# Patient Record
Sex: FEMALE | Race: WHITE | NOT HISPANIC OR LATINO | Employment: PART TIME | URBAN - METROPOLITAN AREA
[De-identification: names, ages, dates, MRNs, and addresses within clinical notes are randomized per-mention and may not be internally consistent; named-entity substitution may affect disease eponyms.]

---

## 2017-12-08 ENCOUNTER — TRANSFERRED RECORDS (OUTPATIENT)
Dept: HEALTH INFORMATION MANAGEMENT | Facility: CLINIC | Age: 72
End: 2017-12-08

## 2018-04-04 ENCOUNTER — TRANSFERRED RECORDS (OUTPATIENT)
Dept: HEALTH INFORMATION MANAGEMENT | Facility: CLINIC | Age: 73
End: 2018-04-04

## 2018-04-20 ENCOUNTER — TRANSFERRED RECORDS (OUTPATIENT)
Dept: HEALTH INFORMATION MANAGEMENT | Facility: CLINIC | Age: 73
End: 2018-04-20

## 2018-05-01 ENCOUNTER — TRANSFERRED RECORDS (OUTPATIENT)
Dept: HEALTH INFORMATION MANAGEMENT | Facility: CLINIC | Age: 73
End: 2018-05-01

## 2018-06-07 ENCOUNTER — TRANSFERRED RECORDS (OUTPATIENT)
Dept: HEALTH INFORMATION MANAGEMENT | Facility: CLINIC | Age: 73
End: 2018-06-07

## 2018-07-30 ENCOUNTER — TRANSFERRED RECORDS (OUTPATIENT)
Dept: HEALTH INFORMATION MANAGEMENT | Facility: CLINIC | Age: 73
End: 2018-07-30

## 2018-08-08 ENCOUNTER — TRANSFERRED RECORDS (OUTPATIENT)
Dept: HEALTH INFORMATION MANAGEMENT | Facility: CLINIC | Age: 73
End: 2018-08-08

## 2018-10-23 ENCOUNTER — TRANSFERRED RECORDS (OUTPATIENT)
Dept: HEALTH INFORMATION MANAGEMENT | Facility: CLINIC | Age: 73
End: 2018-10-23

## 2018-11-01 ENCOUNTER — TRANSFERRED RECORDS (OUTPATIENT)
Dept: HEALTH INFORMATION MANAGEMENT | Facility: CLINIC | Age: 73
End: 2018-11-01

## 2019-01-21 ENCOUNTER — TRANSFERRED RECORDS (OUTPATIENT)
Dept: HEALTH INFORMATION MANAGEMENT | Facility: CLINIC | Age: 74
End: 2019-01-21

## 2019-04-10 ENCOUNTER — TRANSFERRED RECORDS (OUTPATIENT)
Dept: HEALTH INFORMATION MANAGEMENT | Facility: CLINIC | Age: 74
End: 2019-04-10

## 2019-05-10 ENCOUNTER — TRANSFERRED RECORDS (OUTPATIENT)
Dept: HEALTH INFORMATION MANAGEMENT | Facility: CLINIC | Age: 74
End: 2019-05-10

## 2019-07-24 ENCOUNTER — TRANSFERRED RECORDS (OUTPATIENT)
Dept: HEALTH INFORMATION MANAGEMENT | Facility: CLINIC | Age: 74
End: 2019-07-24

## 2019-08-21 ENCOUNTER — TELEPHONE (OUTPATIENT)
Dept: ORTHOPEDICS | Facility: CLINIC | Age: 74
End: 2019-08-21

## 2019-08-21 NOTE — TELEPHONE ENCOUNTER
[facility] Call Center    Phone Message    May a detailed message be left on voicemail: yes    Reason for Call: Other: Catrachita, with [facility], calling in to check the status of the referral that was sent over on 8/12/19. Catrachita stated that she also mailed a CD over to the clinic as well. Catrachita is wanting to discuss the MRI that she was told provider wants pt to have done. Writer was unable to see any records or notes regarding a referral or MRI. Please follow p with Catrachita as soon as available to discuss further. Thank you      Action Taken: Message routed to:  [facility] ([facility] Ortho  .  See phone message. CORRECT PHONE # [facility][phone].   I called Catrachita back & told her we did get disc of XR's & old MRI  & she will schedule New MRI for 8-30-19.  I called pt. & scheduled appt. & asked her to be sure to have [facility] send us new MRI & she agreed.  Christine Russo RN.

## 2019-08-26 ENCOUNTER — TRANSFERRED RECORDS (OUTPATIENT)
Dept: HEALTH INFORMATION MANAGEMENT | Facility: CLINIC | Age: 74
End: 2019-08-26

## 2019-08-29 ENCOUNTER — DOCUMENTATION ONLY (OUTPATIENT)
Dept: CARE COORDINATION | Facility: CLINIC | Age: 74
End: 2019-08-29

## 2019-08-29 NOTE — TELEPHONE ENCOUNTER
RECORDS RECEIVED FROM: appt per ina caimlo in ortho. broken preeti lumbar fusion 2017 at [phone]. new lumbar mri to be done august 30th 2019 @ [facility].  Also need imaging & records from [facility].   DATE RECEIVED: Oct 10, 2019    NOTES STATUS DETAILS   OFFICE NOTE from referring provider Received 7/24/19 Dr. Rios   OFFICE NOTE from other specialist Received PT 1/21/19 12/8/17 Dr. Sanchez   DISCHARGE SUMMARY from hospital Care Everywhere    DISCHARGE REPORT from the ER N/A    OPERATIVE REPORT Care Everywhere 11/7/17 Dr. Sanchez  3/8/16 Dr. Sanchez   MEDICATION LIST Care Everywhere    IMPLANT RECORD/STICKER Care Everywhere    LABS     CBC/DIFF N/A    CULTURES N/A    INJECTIONS DONE IN RADIOLOGY Received  R: 6/7/18   MRI Received  8/30/19    12/7/15    CT SCAN Received  R: 7/30/18, 4/20/18   XRAYS (IMAGES & REPORTS) Received  R: 7/24/19, 4/4/18, 1/16/18    Critical access hospital 11/9/17     [facility] 4/1/17, 12/23/16, 9/925/16, 6/22/16, 4/20/16, 1/27/16    TUMOR     PATHOLOGY  Slides & report N/A      08/29/19 SE  2:21 PM  [facility] to [facility] for images.     09/05/19 SE  11:31 AM  Received [facility] images

## 2019-08-30 ENCOUNTER — TRANSFERRED RECORDS (OUTPATIENT)
Dept: HEALTH INFORMATION MANAGEMENT | Facility: CLINIC | Age: 74
End: 2019-08-30

## 2019-10-10 ENCOUNTER — ANCILLARY PROCEDURE (OUTPATIENT)
Dept: GENERAL RADIOLOGY | Facility: CLINIC | Age: 74
End: 2019-10-10
Attending: ORTHOPAEDIC SURGERY
Payer: MEDICARE

## 2019-10-10 ENCOUNTER — OFFICE VISIT (OUTPATIENT)
Dept: ORTHOPEDICS | Facility: CLINIC | Age: 74
End: 2019-10-10
Payer: MEDICARE

## 2019-10-10 ENCOUNTER — PRE VISIT (OUTPATIENT)
Dept: ORTHOPEDICS | Facility: CLINIC | Age: 74
End: 2019-10-10

## 2019-10-10 VITALS — WEIGHT: 222 LBS | BODY MASS INDEX: 40.85 KG/M2 | HEIGHT: 62 IN

## 2019-10-10 DIAGNOSIS — M54.9 BACK PAIN: ICD-10-CM

## 2019-10-10 DIAGNOSIS — G57.01 PIRIFORMIS SYNDROME OF RIGHT SIDE: ICD-10-CM

## 2019-10-10 DIAGNOSIS — M40.30 FLATBACK SYNDROME: Primary | ICD-10-CM

## 2019-10-10 DIAGNOSIS — M54.9 BACK PAIN: Primary | ICD-10-CM

## 2019-10-10 DIAGNOSIS — M96.0 PSEUDARTHROSIS AFTER JOINT FUSION: ICD-10-CM

## 2019-10-10 DIAGNOSIS — M46.1 SACROILIITIS (H): ICD-10-CM

## 2019-10-10 RX ORDER — ATORVASTATIN CALCIUM 40 MG/1
40 TABLET, FILM COATED ORAL AT BEDTIME
COMMUNITY
Start: 2016-01-09

## 2019-10-10 RX ORDER — FUROSEMIDE 20 MG
1 TABLET ORAL EVERY MORNING
COMMUNITY
Start: 2017-02-20

## 2019-10-10 RX ORDER — PANTOPRAZOLE SODIUM 40 MG/1
40 TABLET, DELAYED RELEASE ORAL 2 TIMES DAILY
COMMUNITY

## 2019-10-10 RX ORDER — GABAPENTIN 100 MG/1
200 CAPSULE ORAL 2 TIMES DAILY
Status: ON HOLD | COMMUNITY
Start: 2017-03-07 | End: 2020-01-08

## 2019-10-10 RX ORDER — ACETAMINOPHEN 325 MG/1
650 TABLET ORAL EVERY MORNING
COMMUNITY
End: 2022-08-25

## 2019-10-10 RX ORDER — ALBUTEROL SULFATE 90 UG/1
2 AEROSOL, METERED RESPIRATORY (INHALATION)
Status: ON HOLD | COMMUNITY
Start: 2017-04-17 | End: 2020-01-08

## 2019-10-10 RX ORDER — VALACYCLOVIR HYDROCHLORIDE 500 MG/1
1 TABLET, FILM COATED ORAL PRN
Refills: 3 | COMMUNITY
Start: 2018-11-01

## 2019-10-10 RX ORDER — BENZONATATE 100 MG/1
1 CAPSULE ORAL PRN
Refills: 0 | COMMUNITY
Start: 2018-11-19 | End: 2019-12-13

## 2019-10-10 RX ORDER — OMEGA-3 FATTY ACIDS/FISH OIL 300-1000MG
1-2 CAPSULE ORAL PRN
Status: ON HOLD | COMMUNITY
End: 2020-01-08

## 2019-10-10 ASSESSMENT — MIFFLIN-ST. JEOR: SCORE: 1459.11

## 2019-10-10 NOTE — LETTER
10/10/2019       RE: Carmelita Mina  [id number] [facility] [address]     Dear Colleague,    Thank you for referring your patient, Carmelita Mina, to the [facility] at [facility]. Please see a copy of my visit note below.    Spine Surgery Consultation    REFERRING PHYSICIAN: Chino Langford   PRIMARY CARE PHYSICIAN: Salvatore Rios           Chief Complaint:   Back pain     History of Present Illness:     Carmelita Mina is a 73 year old F who presents today to discuss her lower back pain.  Patient had a spinal fusion in March 2016 for lower back pain and right leg radicular symptoms.  She says that she improved, however she broke a preeti or a screw and this was revised in 2018.  She reports today that she constant, sharp lower back pain.  Standing for long periods of time and sitting on her right buttock exacerbates her pain. She also has occasional right L4 radicular symptoms.  She takes Tylenol and ibuprofen.  She has had an injection about a year ago.  If this did not relieve her symptoms.  She did do physical therapy but has not done anything recently.  She is on gabapentin.    IAM 44.4%    From patients operative note in 03/08/2016.     1. Posterior lumbar interbody fusion, L5-S1.  2. Transforaminal lumbar interbody fusion, L2-L5.  3. Application of intervertebral biomechanical devices, L2-S1 (these were T-PAL interbody spacers from Synthes from L2 through L5, and expandable interbody cages from the [employer] rise set at L5-S1).  4. Bilateral decompressive facetectomies, L4-L5 and L5-S1.  5. Unilateral decompressive facetectomy on the left, L2-L3 and L3-L4.  6. Local harvesting of morselized autograft through the same incision.  7. Use of morselized allograft.  8. Application of posterior lumbar segmental fixation, L2-S1 (these were Expedium pedicle screws from [employer]).  9. Creation of posterior arthrodesis on the right L2-L4 using  morselized autograft and Ladi Elite allograft.  10. Creation of bilateral posterolateral arthrodesis L2-S1 using Medtronic MagniFuse allograft.           Past Medical History:   Denies         Past Surgical History:   Cholecystectomy   Appendectomy  Hysterectomy         Social History:     Social History     Tobacco Use     Smoking status: Not on file   Substance Use Topics     Alcohol use: Not on file            Family History:   Denies any issues with easy bleeding, bruising, or blood clots.    No family history on file.         Allergies:   Allergies not on file         Medications:     No current outpatient medications on file.     No current facility-administered medications for this visit.              Review of Systems:     A 10 point ROS was performed and reviewed. Specific responses to these questions are noted at the end of the document.         Physical Exam:   Vitals: There were no vitals taken for this visit.  Constitutional: awake, alert, cooperative, no apparent distress, appears stated age.    Eyes: The sclera are anicteric. Vision is grossly normal.    Ears, Nose, Throat: Normocephalic, atraumatic.  Hearing is intact spoken word.  Mucosal membranes are moist.  Trachea appears midline  Psychiatric: The patient has a normal affect.  Respiratory: breathing non-labored  Cardiovascular: The extremities are warm and perfused.  Skin: no obvious rashes or lesions.  Musculoskeletal, Neurologic, and Spine:     ROSHAN (+), Gaenslen (-), Thigh trust (+), Compression (-), Distraction (-).    Negative trendelenberg   TTP over PSIS   TTP over GT   TTP R piriformis    Lower extremities:  Motor Strength Right Left   Hip flexion: L1, L2, L3 5/5 5/5   Hip adduction: L2, L3 5/5 5/5   Knee flexion: S1 5/5 5/5   Knee extension: L3, L4 5/5 5/5   Ankle dosiflexion: L4, L5 5/5 5/5   EHL: L5 5/5 5/5   Ankle plantarflexion: S1 5/5 5/5     Sensation from L1-S2 is preserved.    Reflexes Right Left   Patellar 1+ 1+   Achilles  1+ 1+   Clonus - -          Imaging:   We ordered and independently reviewed new radiographs at this clinic visit. The results were discussed with the patient.  Findings include:  Evidence of L2 to pelvis fusion.  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    Sagittal spinal pelvic alignment was assessed: Pelvic incidence 67, pelvic tilt 39,  lumbar lordosis (L1-S1) 36, thoracic kyphosis (T4-12) 33.             Assessment and Plan:   Carmelita Mina is a 73 year old F who presents today to discuss the following    1.  Flat back syndrome  2.  Spinal fusion pseudoarthrosis with failed instrumentation  3.  Right piriformis syndrome  4.  Right SI joint pain.    We had a long discussion with Carmelita and her daughter.  It is clear the patient has several things going on.  Because she has flat back syndrome with a pseudoarthrosis of her fusion, we think that she would benefit from an L1 to pelvis revision TLIF with pedicle subtraction osteotomies, Lorenzo-Flowers osteotomies and SI joint fusion with a bed rock procedure.       We discussed that surgery will likely take 5-6 hours. Would then be off work or normal activity for 6 weeks. Then back to lite duty/activity for 6 weeks, and start feeling better and back to normal activity by 3-6 months postoperatively. The risks, benefits and alternative treatments of surgery were extensively discussed. The risks of surgery include but are not limited to the risks of anesthesia including death, heart attack, stroke, blood clot, pneumonia, bladder infection or blindness. The surgical risks include bleeding possibly requiring a blood transfusion. We discussed using cell saver for this procedure, which allows us to return up to 1/3 of the blood volume lost. The next risk is risk of infection. For this procedure, the infection rate is 1-2%. We do give antibiotics before and after the procedure as well as put antibiotic powder directly in the wound after the operation. The  next risk is risk of nerve damage from numbness and tingling to difficulty walking. Numbness and tingling is significantly more common. The next risk is failure to heal and finally failure to relieve pain. Patient voiced understanding and elected to proceed with surgical planning.    She is in agreement with this plan.  All questions and concerns were addressed.      The patient was seen and discussed with Dr Samuels, orthopaedic surgery spine surgeon.    Coni Leary MD     I saw and evaluated the patient and developed the plan.  Segundo Samuels MD    Orthopedic Surgery, PGY-4    Answers for HPI/ROS submitted by the patient on 10/7/2019   General Symptoms: No  Skin Symptoms: No  HENT Symptoms: No  EYE SYMPTOMS: No  HEART SYMPTOMS: No  LUNG SYMPTOMS: No  INTESTINAL SYMPTOMS: No  URINARY SYMPTOMS: No  GYNECOLOGIC SYMPTOMS: No  BREAST SYMPTOMS: No  SKELETAL SYMPTOMS: No  BLOOD SYMPTOMS: No  NERVOUS SYSTEM SYMPTOMS: No  MENTAL HEALTH SYMPTOMS: No

## 2019-10-10 NOTE — NURSING NOTE
"Reason For Visit: No chief complaint on file.      Primary MD: Salvatore Rios    ?  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  Currently working? Yes.  Work status?  Part-time.    Date of surgery & Surgery:  11/2017 L4 or L5 Alonzo revision and new hardware  3/2016  Rods place in L3 or L4 to S1    Smoker: No  Request smoking cessation information: No    Ht 1.565 m (5' 1.61\")   Wt 100.7 kg (222 lb)   BMI 41.11 kg/m      Pain Assessment  Patient Currently in Pain: Yes  0-10 Pain Scale: 6  Primary Pain Location: Back  Other Pain Locations: Lower back  Pain Descriptors: Constant, Burning, Stabbing  Alleviating Factors: Heat, NSAIDS(chnaging positions)  Aggravating Factors: Standing, Sitting, Squatting    Oswestry (IAM) Questionnaire    OSWESTRY DISABILITY INDEX 10/7/2019   Count 9   Sum 20   Oswestry Score (%) 44.44            Neck Disability Index (NDI) Questionnaire    No flowsheet data found.                Promis 10 Assessment    PROMIS 10 10/7/2019   In general, would you say your health is: Good   In general, would you say your quality of life is: Very good   In general, how would you rate your physical health? Good   In general, how would you rate your mental health, including your mood and your ability to think? Good   In general, how would you rate your satisfaction with your social activities and relationships? Good   In general, please rate how well you carry out your usual social activities and roles Fair   To what extent are you able to carry out your everyday physical activities such as walking, climbing stairs, carrying groceries, or moving a chair? Moderately   How often have you been bothered by emotional problems such as feeling anxious, depressed or irritable? Rarely   How would you rate your fatigue on average? Moderate   How would you rate your pain on average?   0 = No Pain  to  10 = Worst Imaginable Pain 5   In general, would you say your health is: 3   In general, would you say your quality " of life is: 4   In general, how would you rate your physical health? 3   In general, how would you rate your mental health, including your mood and your ability to think? 3   In general, how would you rate your satisfaction with your social activities and relationships? 3   In general, please rate how well you carry out your usual social activities and roles. (This includes activities at home, at work and in your community, and responsibilities as a parent, child, spouse, employee, friend, etc.) 2   To what extent are you able to carry out your everyday physical activities such as walking, climbing stairs, carrying groceries, or moving a chair? 3   In the past 7 days, how often have you been bothered by emotional problems such as feeling anxious, depressed, or irritable? 2   In the past 7 days, how would you rate your fatigue on average? 3   In the past 7 days, how would you rate your pain on average, where 0 means no pain, and 10 means worst imaginable pain? 5   Global Mental Health Score 14   Global Physical Health Score 12   PROMIS TOTAL - SUBSCORES 26                Loretta Brennan LPN

## 2019-10-10 NOTE — PROGRESS NOTES
Spine Surgery Consultation    REFERRING PHYSICIAN: Chino Langford   PRIMARY CARE PHYSICIAN: Salvatore Rios           Chief Complaint:   Back pain     History of Present Illness:     Carmelita Mina is a 73 year old F who presents today to discuss her lower back pain.  Patient had a spinal fusion in March 2016 for lower back pain and right leg radicular symptoms.  She says that she improved, however she broke a preeti or a screw and this was revised in 2018.  She reports today that she constant, sharp lower back pain.  Standing for long periods of time and sitting on her right buttock exacerbates her pain. She also has occasional right L4 radicular symptoms.  She takes Tylenol and ibuprofen.  She has had an injection about a year ago.  If this did not relieve her symptoms.  She did do physical therapy but has not done anything recently.  She is on gabapentin.    IAM 44.4%    From patients operative note in 03/08/2016.     1. Posterior lumbar interbody fusion, L5-S1.  2. Transforaminal lumbar interbody fusion, L2-L5.  3. Application of intervertebral biomechanical devices, L2-S1 (these were T-PAL interbody spacers from Synthes from L2 through L5, and expandable interbody cages from the [employer] rise set at L5-S1).  4. Bilateral decompressive facetectomies, L4-L5 and L5-S1.  5. Unilateral decompressive facetectomy on the left, L2-L3 and L3-L4.  6. Local harvesting of morselized autograft through the same incision.  7. Use of morselized allograft.  8. Application of posterior lumbar segmental fixation, L2-S1 (these were Expedium pedicle screws from DePuy).  9. Creation of posterior arthrodesis on the right L2-L4 using morselized autograft and Ladi Elite allograft.  10. Creation of bilateral posterolateral arthrodesis L2-S1 using Galapagostronic MagniFuse allograft.           Past Medical History:   Denies         Past Surgical History:   Cholecystectomy   Appendectomy  Hysterectomy         Social History:     Social  History     Tobacco Use     Smoking status: Not on file   Substance Use Topics     Alcohol use: Not on file            Family History:   Denies any issues with easy bleeding, bruising, or blood clots.    No family history on file.         Allergies:   Allergies not on file         Medications:     No current outpatient medications on file.     No current facility-administered medications for this visit.              Review of Systems:     A 10 point ROS was performed and reviewed. Specific responses to these questions are noted at the end of the document.         Physical Exam:   Vitals: There were no vitals taken for this visit.  Constitutional: awake, alert, cooperative, no apparent distress, appears stated age.    Eyes: The sclera are anicteric. Vision is grossly normal.    Ears, Nose, Throat: Normocephalic, atraumatic.  Hearing is intact spoken word.  Mucosal membranes are moist.  Trachea appears midline  Psychiatric: The patient has a normal affect.  Respiratory: breathing non-labored  Cardiovascular: The extremities are warm and perfused.  Skin: no obvious rashes or lesions.  Musculoskeletal, Neurologic, and Spine:     ROSHAN (+), Gaenslen (-), Thigh trust (+), Compression (-), Distraction (-).    Negative trendelenberg   TTP over PSIS   TTP over GT   TTP R piriformis    Lower extremities:  Motor Strength Right Left   Hip flexion: L1, L2, L3 5/5 5/5   Hip adduction: L2, L3 5/5 5/5   Knee flexion: S1 5/5 5/5   Knee extension: L3, L4 5/5 5/5   Ankle dosiflexion: L4, L5 5/5 5/5   EHL: L5 5/5 5/5   Ankle plantarflexion: S1 5/5 5/5     Sensation from L1-S2 is preserved.    Reflexes Right Left   Patellar 1+ 1+   Achilles 1+ 1+   Clonus - -          Imaging:   We ordered and independently reviewed new radiographs at this clinic visit. The results were discussed with the patient.  Findings include:  Evidence of L2 to pelvis fusion.  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    Sagittal spinal pelvic  alignment was assessed: Pelvic incidence 67, pelvic tilt 39,  lumbar lordosis (L1-S1) 36, thoracic kyphosis (T4-12) 33.             Assessment and Plan:   Carmelita Mina is a 73 year old F who presents today to discuss the following    1.  Flat back syndrome  2.  Spinal fusion pseudoarthrosis with failed instrumentation  3.  Right piriformis syndrome  4.  Right SI joint pain.    We had a long discussion with Carmelita and her daughter.  It is clear the patient has several things going on.  Because she has flat back syndrome with a pseudoarthrosis of her fusion, we think that she would benefit from an L1 to pelvis revision TLIF with pedicle subtraction osteotomies, Lorenzo-Flowers osteotomies and SI joint fusion with a bed rock procedure.       We discussed that surgery will likely take 5-6 hours. Would then be off work or normal activity for 6 weeks. Then back to lite duty/activity for 6 weeks, and start feeling better and back to normal activity by 3-6 months postoperatively. The risks, benefits and alternative treatments of surgery were extensively discussed. The risks of surgery include but are not limited to the risks of anesthesia including death, heart attack, stroke, blood clot, pneumonia, bladder infection or blindness. The surgical risks include bleeding possibly requiring a blood transfusion. We discussed using cell saver for this procedure, which allows us to return up to 1/3 of the blood volume lost. The next risk is risk of infection. For this procedure, the infection rate is 1-2%. We do give antibiotics before and after the procedure as well as put antibiotic powder directly in the wound after the operation. The next risk is risk of nerve damage from numbness and tingling to difficulty walking. Numbness and tingling is significantly more common. The next risk is failure to heal and finally failure to relieve pain. Patient voiced understanding and elected to proceed with surgical planning.    She is in  agreement with this plan.  All questions and concerns were addressed.      The patient was seen and discussed with Dr Samuels, orthopaedic surgery spine surgeon.    Coni Leary MD     I saw and evaluated the patient and developed the plan.  Segundo Samuels MD    Orthopedic Surgery, PGY-4    Answers for HPI/ROS submitted by the patient on 10/7/2019   General Symptoms: No  Skin Symptoms: No  HENT Symptoms: No  EYE SYMPTOMS: No  HEART SYMPTOMS: No  LUNG SYMPTOMS: No  INTESTINAL SYMPTOMS: No  URINARY SYMPTOMS: No  GYNECOLOGIC SYMPTOMS: No  BREAST SYMPTOMS: No  SKELETAL SYMPTOMS: No  BLOOD SYMPTOMS: No  NERVOUS SYSTEM SYMPTOMS: No  MENTAL HEALTH SYMPTOMS: No

## 2019-10-15 ENCOUNTER — PREP FOR PROCEDURE (OUTPATIENT)
Dept: ORTHOPEDICS | Facility: CLINIC | Age: 74
End: 2019-10-15

## 2019-10-15 DIAGNOSIS — M40.30 FLATBACK SYNDROME: Primary | ICD-10-CM

## 2019-10-15 DIAGNOSIS — S32.009K PSEUDOARTHROSIS OF LUMBAR SPINE: ICD-10-CM

## 2019-10-20 ENCOUNTER — TRANSFERRED RECORDS (OUTPATIENT)
Dept: HEALTH INFORMATION MANAGEMENT | Facility: CLINIC | Age: 74
End: 2019-10-20

## 2019-10-24 ENCOUNTER — TELEPHONE (OUTPATIENT)
Dept: ORTHOPEDICS | Facility: CLINIC | Age: 74
End: 2019-10-24

## 2019-10-24 NOTE — TELEPHONE ENCOUNTER
Phoned patient to schedule surgery with Dr. Samuels. I left her her my direct number to call back when she is able. [phone].

## 2019-10-28 PROBLEM — S32.009K PSEUDOARTHROSIS OF LUMBAR SPINE: Status: ACTIVE | Noted: 2019-10-28

## 2019-10-28 PROBLEM — M40.30 FLATBACK SYNDROME: Status: ACTIVE | Noted: 2019-10-28

## 2019-10-29 ENCOUNTER — PRE VISIT (OUTPATIENT)
Dept: SURGERY | Facility: CLINIC | Age: 74
End: 2019-10-29

## 2019-10-29 NOTE — TELEPHONE ENCOUNTER
FUTURE VISIT INFORMATION      SURGERY INFORMATION:    Date: 1/7/20    Location: [facility] OR    Surgeon:  Wen Whitaker    Anesthesia Type:  General    RECORDS REQUESTED FROM:       Primary Care Provider: Salvatore Rios MD- [facility]- requested recs/testing- received and sent to scanning

## 2019-10-31 NOTE — TELEPHONE ENCOUNTER
Patient is scheduled for surgery with Dr. Samuels and Dr. Moyer    Spoke or left message with: Spoke with patient on 10/28/19    Date of Surgery: 1/7/20    Location: [place]    Post ops: 6 weeks & 3 months    Pre-op with surgeon (if applicable): Complete    H&P: Scheduled with PAC 12/13/19    Additional imaging/appointments: N/A    Surgery packet: Received in clinic     Additional comments: N/A

## 2019-11-14 ENCOUNTER — TRANSFERRED RECORDS (OUTPATIENT)
Dept: HEALTH INFORMATION MANAGEMENT | Facility: CLINIC | Age: 74
End: 2019-11-14

## 2019-12-10 PROBLEM — M19.90 ARTHRITIS: Status: ACTIVE | Noted: 2019-12-10

## 2019-12-13 ENCOUNTER — OFFICE VISIT (OUTPATIENT)
Dept: SURGERY | Facility: CLINIC | Age: 74
End: 2019-12-13
Payer: MEDICARE

## 2019-12-13 ENCOUNTER — ANESTHESIA EVENT (OUTPATIENT)
Dept: SURGERY | Facility: CLINIC | Age: 74
End: 2019-12-13

## 2019-12-13 ENCOUNTER — PRE VISIT (OUTPATIENT)
Dept: SURGERY | Facility: CLINIC | Age: 74
End: 2019-12-13

## 2019-12-13 VITALS
HEIGHT: 62 IN | DIASTOLIC BLOOD PRESSURE: 86 MMHG | SYSTOLIC BLOOD PRESSURE: 138 MMHG | TEMPERATURE: 98.3 F | OXYGEN SATURATION: 100 % | BODY MASS INDEX: 41.22 KG/M2 | WEIGHT: 224 LBS | RESPIRATION RATE: 14 BRPM | HEART RATE: 65 BPM

## 2019-12-13 DIAGNOSIS — M40.30 FLATBACK SYNDROME: ICD-10-CM

## 2019-12-13 DIAGNOSIS — Z01.818 PRE-OP EXAMINATION: Primary | ICD-10-CM

## 2019-12-13 LAB
ANION GAP SERPL CALCULATED.3IONS-SCNC: 3 MMOL/L (ref 3–14)
BUN SERPL-MCNC: 15 MG/DL (ref 7–30)
CALCIUM SERPL-MCNC: 9.3 MG/DL (ref 8.5–10.1)
CHLORIDE SERPL-SCNC: 105 MMOL/L (ref 94–109)
CO2 SERPL-SCNC: 31 MMOL/L (ref 20–32)
CREAT SERPL-MCNC: 0.96 MG/DL (ref 0.52–1.04)
ERYTHROCYTE [DISTWIDTH] IN BLOOD BY AUTOMATED COUNT: 13 % (ref 10–15)
GFR SERPL CREATININE-BSD FRML MDRD: 59 ML/MIN/{1.73_M2}
GLUCOSE SERPL-MCNC: 101 MG/DL (ref 70–99)
HCT VFR BLD AUTO: 39.3 % (ref 35–47)
HGB BLD-MCNC: 12.1 G/DL (ref 11.7–15.7)
MCH RBC QN AUTO: 30 PG (ref 26.5–33)
MCHC RBC AUTO-ENTMCNC: 30.8 G/DL (ref 31.5–36.5)
MCV RBC AUTO: 97 FL (ref 78–100)
MRSA DNA SPEC QL NAA+PROBE: NEGATIVE
NT-PROBNP SERPL-MCNC: 166 PG/ML (ref 0–125)
PLATELET # BLD AUTO: 242 10E9/L (ref 150–450)
POTASSIUM SERPL-SCNC: 4.5 MMOL/L (ref 3.4–5.3)
RBC # BLD AUTO: 4.04 10E12/L (ref 3.8–5.2)
SODIUM SERPL-SCNC: 140 MMOL/L (ref 133–144)
SPECIMEN SOURCE: NORMAL
WBC # BLD AUTO: 5.6 10E9/L (ref 4–11)

## 2019-12-13 PROCEDURE — 87641 MR-STAPH DNA AMP PROBE: CPT | Performed by: PHYSICIAN ASSISTANT

## 2019-12-13 PROCEDURE — 86923 COMPATIBILITY TEST ELECTRIC: CPT | Performed by: PHYSICIAN ASSISTANT

## 2019-12-13 PROCEDURE — 87640 STAPH A DNA AMP PROBE: CPT | Performed by: PHYSICIAN ASSISTANT

## 2019-12-13 RX ORDER — MAGNESIUM SULFATE HEPTAHYDRATE 40 MG/ML
2 INJECTION, SOLUTION INTRAVENOUS ONCE
Status: CANCELLED | OUTPATIENT
Start: 2019-12-13 | End: 2019-12-13

## 2019-12-13 RX ORDER — PROPOFOL 10 MG/ML
25-150 INJECTION, EMULSION INTRAVENOUS CONTINUOUS
Status: CANCELLED | OUTPATIENT
Start: 2019-12-13

## 2019-12-13 ASSESSMENT — MIFFLIN-ST. JEOR: SCORE: 1469.31

## 2019-12-13 ASSESSMENT — ENCOUNTER SYMPTOMS: SEIZURES: 0

## 2019-12-13 ASSESSMENT — PAIN SCALES - GENERAL: PAINLEVEL: MODERATE PAIN (4)

## 2019-12-13 NOTE — ANESTHESIA PREPROCEDURE EVALUATION
Anesthesia Pre-Procedure Evaluation    Patient: Carmelita Mina   MRN:     [id number] Gender:   female   Age:    74 year old :      1945        Preoperative Diagnosis: * No surgery found *        Past Medical History:   Diagnosis Date     Asthma      Back pain      Flatback syndrome      GERD (gastroesophageal reflux disease)      HLD (hyperlipidemia)      HTN (hypertension)      Pseudoarthrosis of lumbar spine      Sinusitis       Past Surgical History:   Procedure Laterality Date     BUNIONECTOMY Bilateral      DENTAL SURGERY       HYSTERECTOMY       L2-5 TRANSFORAMINAL LUMBAR INTERBODY FUSION L5-S1 POSTERIOR LUMBAR INTERBODY FUSION  2016     L5-S1 pseudoarthrosis revision with BMP and iliac screws  2017     LAPAROSCOPIC APPENDECTOMY       LAPAROSCOPIC CHOLECYSTECTOMY       LAPAROSCOPY TUBAL LIGATION       LAPAROTOMY UTERUS FIBROID TUMOR RESECTION/MYOMECTOMY       SINUS SURGERY            Anesthesia Evaluation     . Pt has had prior anesthetic. Type: General    History of anesthetic complications    patient had cardiac arrest after talwin and atropine combination with  tubal ligation in the s. No issues with anesthesia since then      ROS/MED HX    ENT/Pulmonary:     (+)Intermittent asthma (when the patient gets sick ) Last exacerbation: winter 2018,Treatment: Inhaler prn,  , . .    Neurologic:     (+)neuropathy - right lower radiculopathy ,    (-) seizures and CVA   Cardiovascular: Comment: No cardiac symptoms but METS limited secondary to back pain. Patient still works part time at [employer]    (+) Dyslipidemia, hypertension----. : . . . :. . Previous cardiac testing date:results:date: results:ECG reviewed date:10/20/19 results:normal sinus rhythm, left axis deviation, left ventricular hypertrophy with QRS widening and repolarization abnormality date: results:          METS/Exercise Tolerance:  3 - Able to walk 1-2 blocks without stopping   Hematologic:  - neg hematologic  ROS   (+)  History of Transfusion no previous transfusion reaction -     (-) history of blood clots and anemia   Musculoskeletal:   (+)  other musculoskeletal- flat back syndrome pseudoarthrosis of lumbar spine       GI/Hepatic:     (+) GERD Asymptomatic on medication,       Renal/Genitourinary:         Endo:     (+) Obesity, .      Psychiatric:  - neg psychiatric ROS       Infectious Disease:         Malignancy:      - no malignancy   Other: Comment: Patient with history of SLE in 1980's with rash x2. Not issues since then. Not followed by rheumatology and not on medications.    (+) No chance of pregnancy no H/O Chronic Pain,no other significant disability                        PHYSICAL EXAM:   Mental Status/Neuro: A/A/O; Age Appropriate   Airway: Facies: Feasible  Mallampati: I  Mouth/Opening: Full  TM distance: > 6 cm  Neck ROM: Full   Respiratory: Auscultation: CTAB     Resp. Rate: Normal     Resp. Effort: Normal      CV: Rhythm: Regular  Heart: Normal Sounds  Edema: None  Pulses: Normal   Comments: Upper denture and partial lower denture.      Dental: Dentures  Dentures: Upper; Lower                 Ref. Range 12/13/2019 11:53   Sodium Latest Ref Range: 133 - 144 mmol/L 140   Potassium Latest Ref Range: 3.4 - 5.3 mmol/L 4.5   Chloride Latest Ref Range: 94 - 109 mmol/L 105   Carbon Dioxide Latest Ref Range: 20 - 32 mmol/L 31   Urea Nitrogen Latest Ref Range: 7 - 30 mg/dL 15   Creatinine Latest Ref Range: 0.52 - 1.04 mg/dL 0.96   GFR Estimate Latest Ref Range: >60 mL/min/1.73_m2 59 (L)   GFR Estimate If Black Latest Ref Range: >60 mL/min/1.73_m2 68   Calcium Latest Ref Range: 8.5 - 10.1 mg/dL 9.3   Anion Gap Latest Ref Range: 3 - 14 mmol/L 3   N-Terminal Pro Bnp Latest Ref Range: 0 - 125 pg/mL 166 (H)   Glucose Latest Ref Range: 70 - 99 mg/dL 101 (H)   WBC Latest Ref Range: 4.0 - 11.0 10e9/L 5.6   Hemoglobin Latest Ref Range: 11.7 - 15.7 g/dL 12.1   Hematocrit Latest Ref Range: 35.0 - 47.0 % 39.3   Platelet Count Latest  "Ref Range: 150 - 450 10e9/L 242   RBC Count Latest Ref Range: 3.8 - 5.2 10e12/L 4.04   MCV Latest Ref Range: 78 - 100 fl 97   MCH Latest Ref Range: 26.5 - 33.0 pg 30.0   MCHC Latest Ref Range: 31.5 - 36.5 g/dL 30.8 (L)   RDW Latest Ref Range: 10.0 - 15.0 % 13.0   BNP noted and <300. No further testing indicated.     Preop Vitals    BP Readings from Last 3 Encounters:   No data found for BP    Pulse Readings from Last 3 Encounters:   No data found for Pulse      Resp Readings from Last 3 Encounters:   No data found for Resp    SpO2 Readings from Last 3 Encounters:   No data found for SpO2      Temp Readings from Last 1 Encounters:   No data found for Temp    Ht Readings from Last 1 Encounters:   10/10/19 1.565 m (5' 1.61\")      Wt Readings from Last 1 Encounters:   10/10/19 100.7 kg (222 lb)    Estimated body mass index is 41.11 kg/m  as calculated from the following:    Height as of 10/10/19: 1.565 m (5' 1.61\").    Weight as of 10/10/19: 100.7 kg (222 lb).     LDA:        LUIS FV AN PLAN NO PONV RULE       PAC Discussion and Assessment    ASA Classification: 2  Case is suitable for: [facility]  Anesthetic techniques and relevant risks discussed: GA  Invasive monitoring and risk discussed:   Types:   Possibility and Risk of blood transfusion discussed:   NPO instructions given:   Additional anesthetic preparation and risks discussed:   Needs early admission to pre-op area:   Other:     PAC Resident/NP Anesthesia Assessment:  Carmelita is a 74 year old woman who is scheduled for Lumbar 1 to pelvis revision transforaminal lumbar interbody fusion, pedicle subtraction osteotomies Lumbar 4 or Lumbar 5, Lorenzo-Flowers osteotomy Lumbar 2, SacroIliac joint fusion with  bedrock on1/7/19 by Dr. Samuels in treatment of flatback syndrome.  PAC referral for risk assessment and optimization for anesthesia with comorbid conditions of HTN, edema, HLD, asthma, obesity, GERD:    Pre-operative considerations:  1.  Cardiac:  Functional status- " METS 3, the patient continues to work part time at [employer] but is limited secondary to pain. She denies any cardiac symptoms.  Intermediate risk surgery with 0.4% (RCRI #) risk of major adverse cardiac event. The patient had EKG on 10/20/19 with normal sinus rhythm, left axis deviation, left ventricular hypertrophy with QRS widening and repolarization abnormality. The patient has no cardiac risk factors but will get BNP given limited METS, age and obesity.   ~ HTN/lower extremity edema - the patient will hold lasix DOS.   ~ HLD - continue lipitor.     2.  Pulm:  Airway feasible.  SHAMA risk: Intermediate (BMI, HTN, age)  ~ Asthma - PRN with colds.     3. ENT: The patient has hearing aids.     4. Endo: Obesity, BMI 40 - consideration for safe lifting techniques.     5. GI:  Risk of PONV score = 3.  If > 2, anti-emetic intervention recommended.  ~ GERD - continue protonix and probiotic.     6. Immunology: The patient reports in the 1980's she had a rash x2 and was diagnosed with SLE at [place]. She has not had any recurrence of the rash or other symptoms. She is not followed by rheumatology and not on medications.     7. Musculoskeletal: flat back syndrome, pseudoarthrosis of lumbar spine - previous surgeries and revision. Procedure as above. Consideration for careful positioning to minimize discomfort.   ~ Radiculopathy and pain - continue gabapentin and tylenol. Hold Advil PM for 48 hours.     VTE risk: 0.5%    Patient is optimized and is acceptable candidate for the proposed procedure.  No further diagnostic evaluation is needed.     Patient discussed with Dr Cho.      For further details of assessment, testing, and physical exam please see H and P completed on same date.    Nadya Fuchs PA-C          Mid-Level Provider/Resident: Nadya Fuchs PA-C  Date: 12/13/19  Time:     Attending Anesthesiologist Anesthesia Assessment:        Anesthesiologist:   Date:   Time:   Pass/Fail:   Disposition:     PAC  Pharmacist Assessment:        Pharmacist:   Date:   Time:    Nadya Fuchs PA-C

## 2019-12-13 NOTE — H&P
Pre-Operative H & P     CC:  Preoperative exam to assess for increased cardiopulmonary risk while undergoing surgery and anesthesia.    Date of Encounter: 12/13/2019  Primary Care Physician:  Salvatore Rios     Reason for visit: pre operative examination, flat back syndrome    HPI  Carmelita Mina is a 74 year old female who presents for pre-operative H & P in preparation for Lumbar 1 to pelvis revision transforaminal lumbar interbody fusion, pedicle subtraction osteotomies Lumbar 4 or Lumbar 5, Lorenzo-Flowers osteotomy Lumbar 2, SacroIliac joint fusion with  bedrock with Dr. Samuels on 1/7/19 at [facility].     The patient is a 74 year old woman who has a history of back pain. She had a L2-L5 spinal fusion in 3/2016. She had improvement in her lower back and right leg radicular symptoms. She unfortunately had broken hardware and then underwent a L5-S1 lumbar pseudoarthrosis revision with iliac screws and BMP revision in 11/2017 for pseudoarthrosis following spinal fusion. She has tried physical therapy and had a steroid injection a year ago. She continues to have constant sharp pain with radicular right sided symptoms. She finds that prolonged standing and a certain position with laying or sitting on her right side make her symptoms worse. She met with Dr. Samuels on 10/10/19 and they discussed her surgical options.     History is obtained from the patient and chart review    Past Medical History  Past Medical History:   Diagnosis Date     Asthma      Back pain      Flatback syndrome      GERD (gastroesophageal reflux disease)      H/O systemic lupus erythematosus (SLE) (H)      HLD (hyperlipidemia)      HTN (hypertension)      Pseudoarthrosis of lumbar spine      Sinusitis        Past Surgical History  Past Surgical History:   Procedure Laterality Date     BUNIONECTOMY Bilateral      DENTAL SURGERY      extraction     HYSTERECTOMY  1985    Same time as danielito hennessy  appy and fibroid removal     L2-5 TRANSFORAMINAL LUMBAR INTERBODY FUSION L5-S1 POSTERIOR LUMBAR INTERBODY FUSION  03/08/2016     L5-S1 pseudoarthrosis revision with BMP and iliac screws  11/07/2017     LAPAROSCOPIC APPENDECTOMY  1985    Same time as lap minoo, appy and fibroid removal     LAPAROSCOPIC CHOLECYSTECTOMY  1985    Same time as lap minoo, appy and fibroid removal     LAPAROSCOPY TUBAL LIGATION       LAPAROTOMY UTERUS FIBROID TUMOR RESECTION/MYOMECTOMY  1985    Same time as lap minoo, appy and fibroid removal     SINUS SURGERY  1988    nasal polyps       Hx of Blood transfusions/reactions: yes, 2016, none     Hx of abnormal bleeding or anti-platelet use: none    Menstrual history: No LMP recorded (exact date). Patient has had a hysterectomy.:     Steroid use in the last year: none    Personal or FH with difficulty with Anesthesia:  History of cardiac arrest with talwin and atropine in the 1960's.     Prior to Admission Medications  Current Outpatient Medications   Medication Sig Dispense Refill     acetaminophen (TYLENOL) 325 MG tablet Take 650 mg by mouth every morning        atorvastatin (LIPITOR) 40 MG tablet Take 40 mg by mouth At Bedtime        furosemide (LASIX) 20 MG tablet Take 1 tablet by mouth every morning        gabapentin (NEURONTIN) 100 MG capsule Take 200 mg by mouth 2 times daily        Ibuprofen-diphenhydrAMINE Cit (ADVIL PM PO) Take 2 tablets by mouth At Bedtime       pantoprazole (PROTONIX) 40 MG EC tablet Take 40 mg by mouth 2 times daily        Probiotic Product (PROBIOTIC-10 PO) Take 1 capsule by mouth every morning       valACYclovir (VALTREX) 500 MG tablet Take 1 tablet by mouth as needed (PT last dose approx a month ago 12/13/19)   3     albuterol (VENTOLIN HFA) 108 (90 Base) MCG/ACT inhaler Inhale 2 puffs into the lungs        ibuprofen (ADVIL/MOTRIN) 200 MG capsule Take 1-2 capsules by mouth as needed (PT last dose a month ago 12/14/19)        Multiple Vitamins-Minerals  "(MULTIVITAL) TABS Take 1 tablet by mouth every morning         Allergies  Allergies   Allergen Reactions     Cefaclor Anaphylaxis     Naproxen Headache, Muscle Pain (Myalgia), Nausea and Vomiting and Hives     Penicillins Unknown and Other (See Comments)     \"I was a kid\" doesn't remember  Childhood reaction. Patient unsure of reaction.       Atropine Other (See Comments)     Talwin atropine combination medication  Cardiac arrest     Pentazocine Other (See Comments)     Talwin atropine combination medication caused cardiac arrest.     Seasonal Allergies Other (See Comments)     Corn, springtime allergens causing sneezing.     Tuberculin Purified Protein Derivative Other (See Comments)     swelling     Erythromycin Rash       Social History  Social History     Socioeconomic History     Marital status:      Spouse name: Not on file     Number of children: Not on file     Years of education: Not on file     Highest education level: Not on file   Occupational History     Not on file   Social Needs     Financial resource strain: Not on file     Food insecurity:     Worry: Not on file     Inability: Not on file     Transportation needs:     Medical: Not on file     Non-medical: Not on file   Tobacco Use     Smoking status: Never Smoker     Smokeless tobacco: Never Used   Substance and Sexual Activity     Alcohol use: Yes     Comment: occasional glass wine      Drug use: Never     Sexual activity: Not on file   Lifestyle     Physical activity:     Days per week: Not on file     Minutes per session: Not on file     Stress: Not on file   Relationships     Social connections:     Talks on phone: Not on file     Gets together: Not on file     Attends Shinto service: Not on file     Active member of club or organization: Not on file     Attends meetings of clubs or organizations: Not on file     Relationship status: Not on file     Intimate partner violence:     Fear of current or ex partner: Not on file     " Emotionally abused: Not on file     Physically abused: Not on file     Forced sexual activity: Not on file   Other Topics Concern     Not on file   Social History Narrative     Not on file       Family History  Family History   Problem Relation Age of Onset     Cardiovascular Mother         PCI with stent     Cerebrovascular Disease Mother         aneurysm      Cardiovascular Sister         MI        Review of Systems  ROS/MED HX    ENT/Pulmonary:     (+)Intermittent asthma (when the patient gets sick ) Last exacerbation: winter 2018,Treatment: Inhaler prn,  , . .    Neurologic:     (+)neuropathy - right lower radiculopathy ,    (-) seizures and CVA   Cardiovascular: Comment: No cardiac symptoms but METS limited secondary to back pain. Patient still works part time at [employer]    (+) Dyslipidemia, hypertension----. : . . . :. . Previous cardiac testing date:results:date: results:ECG reviewed date:10/20/19 results:normal sinus rhythm, left axis deviation, left ventricular hypertrophy with QRS widening and repolarization abnormality date: results:          METS/Exercise Tolerance:  3 - Able to walk 1-2 blocks without stopping   Hematologic:  - neg hematologic  ROS   (+) History of Transfusion no previous transfusion reaction -     (-) history of blood clots and anemia   Musculoskeletal:   (+)  other musculoskeletal- flat back syndrome pseudoarthrosis of lumbar spine       GI/Hepatic:     (+) GERD Asymptomatic on medication,       Renal/Genitourinary:         Endo:     (+) Obesity, .      Psychiatric:  - neg psychiatric ROS       Infectious Disease:         Malignancy:      - no malignancy   Other: Comment: Patient with history of SLE in 1980's with rash x2. Not issues since then. Not followed by rheumatology and not on medications.    (+) No chance of pregnancy no H/O Chronic Pain,no other significant disability          The complete review of systems is negative other than noted in the HPI or here.   Temp: 98.3  F  "(36.8  C) Temp src: Oral BP: 138/86 Pulse: 65   Resp: 14 SpO2: 100 %         224 lbs 0 oz  5' 2\"   Body mass index is 40.97 kg/m .       Physical Exam  Constitutional: Awake, alert, cooperative, no apparent distress, and appears stated age.  Eyes: Pupils equal, round and reactive to light, extra ocular muscles intact, sclera clear, conjunctiva normal.  HENT: Normocephalic, oral pharynx with moist mucus membranes, upper dentures and lower partial dentures. No goiter appreciated.   Respiratory: Clear to auscultation bilaterally, no crackles or wheezing.  Cardiovascular: Regular rate and rhythm, normal S1 and S2, and no murmur noted.  Carotids +2, no bruits. No edema. Palpable pulses to radial  DP and PT arteries.   GI: Normal bowel sounds, soft, non-distended, non-tender, obese, exam limited secondary to exam.   Lymph/Hematologic: No cervical lymphadenopathy and no supraclavicular lymphadenopathy.  Genitourinary:  defer  Skin: Warm and dry.  No rashes at anticipated surgical site.   Musculoskeletal: Full ROM of neck. There is no redness, warmth, or swelling of the joints. Gross motor strength is normal.    Neurologic: Awake, alert, oriented to name, place and time. Cranial nerves II-XII are grossly intact. Gait is normal.   Neuropsychiatric: Calm, cooperative. Normal affect.     Labs: (personally reviewed)  Results for KACI MANUEL (MRN [id number]) as of 12/13/2019 12:34   Ref. Range 12/13/2019 11:53   Sodium Latest Ref Range: 133 - 144 mmol/L 140   Potassium Latest Ref Range: 3.4 - 5.3 mmol/L 4.5   Chloride Latest Ref Range: 94 - 109 mmol/L 105   Carbon Dioxide Latest Ref Range: 20 - 32 mmol/L 31   Urea Nitrogen Latest Ref Range: 7 - 30 mg/dL 15   Creatinine Latest Ref Range: 0.52 - 1.04 mg/dL 0.96   GFR Estimate Latest Ref Range: >60 mL/min/1.73_m2 59 (L)   GFR Estimate If Black Latest Ref Range: >60 mL/min/1.73_m2 68   Calcium Latest Ref Range: 8.5 - 10.1 mg/dL 9.3   Anion Gap Latest Ref Range: 3 " - 14 mmol/L 3   N-Terminal Pro Bnp Latest Ref Range: 0 - 125 pg/mL 166 (H)   Glucose Latest Ref Range: 70 - 99 mg/dL 101 (H)   WBC Latest Ref Range: 4.0 - 11.0 10e9/L 5.6   Hemoglobin Latest Ref Range: 11.7 - 15.7 g/dL 12.1   Hematocrit Latest Ref Range: 35.0 - 47.0 % 39.3   Platelet Count Latest Ref Range: 150 - 450 10e9/L 242   RBC Count Latest Ref Range: 3.8 - 5.2 10e12/L 4.04   MCV Latest Ref Range: 78 - 100 fl 97   MCH Latest Ref Range: 26.5 - 33.0 pg 30.0   MCHC Latest Ref Range: 31.5 - 36.5 g/dL 30.8 (L)   RDW Latest Ref Range: 10.0 - 15.0 % 13.0   BNP noted and <300. No further testing indicated.           EKG: 10/20/19  normal sinus rhythm, left axis deviation, left ventricular hypertrophy with QRS widening and repolarization abnormality     The patient's records and results personally reviewed by this provider.     Outside records reviewed from: care everywhere    ASSESSMENT and PLAN  Carmelita is a 74 year old woman who is scheduled for Lumbar 1 to pelvis revision transforaminal lumbar interbody fusion, pedicle subtraction osteotomies Lumbar 4 or Lumbar 5, Lorenzo-Flowers osteotomy Lumbar 2, SacroIliac joint fusion with  bedrock on1/7/19 by Dr. Samuels in treatment of flatback syndrome.  PAC referral for risk assessment and optimization for anesthesia with comorbid conditions of HTN, edema, HLD, asthma, obesity, GERD:    Pre-operative considerations:  1.  Cardiac:  Functional status- METS 3, the patient continues to work part time at [employer] but is limited secondary to pain. She denies any cardiac symptoms.  Intermediate risk surgery with 0.4% (RCRI #) risk of major adverse cardiac event. The patient had EKG on 10/20/19 with normal sinus rhythm, left axis deviation, left ventricular hypertrophy with QRS widening and repolarization abnormality. The patient has no cardiac risk factors but will get BNP given limited METS, age and obesity.   ~ HTN/lower extremity edema - the patient will hold lasix DOS.   ~ HLD  - continue lipitor.     2.  Pulm:  Airway feasible.  SHAMA risk: Intermediate (BMI, HTN, age)  ~ Asthma - PRN with colds.     3. ENT: The patient has hearing aids.     4. Endo: Obesity, BMI 40 - consideration for safe lifting techniques.     5. GI:  Risk of PONV score = 3.  If > 2, anti-emetic intervention recommended.  ~ GERD - continue protonix and probiotic.     6. Immunology: The patient reports in the 1980's she had a rash x2 and was diagnosed with SLE at [place]. She has not had any recurrence of the rash or other symptoms. She is not followed by rheumatology and not on medications.     7. Musculoskeletal: flat back syndrome, pseudoarthrosis of lumbar spine - previous surgeries and revision. Procedure as above. Consideration for careful positioning to minimize discomfort.   ~ Radiculopathy and pain - continue gabapentin and tylenol. Hold Advil PM for 48 hours.     VTE risk: 0.5%    Patient was discussed with Dr Cho.    The patient is optimized for their procedure. AVS with information on surgery time/arrival time, meds and NPO status given by nursing staff.        Nadya Fuchs PA-C  Preoperative Assessment Center  [facility]  Phone: [phone]  Fax: [phone]

## 2019-12-13 NOTE — PATIENT INSTRUCTIONS
Preparing for Your Surgery      Name:  Carmelita Mina   MRN:  [id number]   :  1945   Today's Date:  2019     Arriving for surgery:  Surgery date:  19  Arrival time:  6:00 am  Please come to:     [facility] [address]  [address] [place]  [address]    - parking is available in front of [facility] from 5:15AM to 8:00PM. If you prefer, park your car in the Green Lot.    -Proceed to the 3rd floor, check in at the Adult Surgery Waiting Lounge. [phone]    If an escort is needed stop at the Information Desk in the lobby. Inform the information person that you are here for surgery. An escort to the Adult Surgery Waiting Lounge will be provided.    What can I eat or drink?  -  You may have solid food or milk products until 8 hours prior to your surgery (midnight).  -  You may have water, apple juice or 7up/Sprite until 2 hours prior to your surgery (6:30 am).    Which medicines can I take?  Stop Aspirin, vitamins and supplements one week prior to surgery.  Hold Ibuprofen for 24 hours and/or Naproxen for 48 hours prior to surgery.   -  Do NOT take these medications in the morning, the day of surgery:  Furosemide (Lasix)    -  Please take these medications the day of surgery:  Acetaminophen (Tylenol)  Valacyclovir (Valtrex)  Albuterol Inhaler   Gabapentin (Neurontin)  Atorvastatin (Lipitor)  Pantoprazole (Protonix)     How do I prepare myself?  -  Take two showers: one the night before surgery; and one the morning of surgery.         Use Scrubcare or Hibiclens to wash from neck down, leave soap on your skin for up to one minute.  Do not get soap in your eyes or ears.  You may use your own shampoo and conditioner; no other hair products.   -  Do NOT use lotion, powder, deodorant, or antiperspirant the day of your surgery.  -  Do NOT wear any makeup, fingernail polish or jewelry.  -  Begin using Incentive Spirometer 1 week prior to surgery.   Use 4 times per day, up to 5 breaths each time.  Bring Incentive Spirometer to hospital.  -  Do not bring your own medications to the hospital.  -  Bring your ID and insurance card.    Questions or Concerns:  -If you are scheduled on the [facility] and have questions or concerns regarding the day of surgery, please call Preadmission Nursing at [phone].     -For questions after surgery please call your surgeons office.     Enhanced Recovery After Surgery     This is a team effort, including you, to get you back on your feet, eating and drinking normally and out of the hospital as quickly as possible.  The goals are:    1) NO INFECTIONS and   2) RETURN TO NORMAL DIET    How can we achieve these goals?  1) STAY ACTIVE: Walk every day before your surgery; try to increase the amount every day.  Walk after surgery as much as you can-the nurses will help you.  Walking speeds healing and gets you home quicker, you heal better at home and have less risk of infection.     2) INCENTIVE SPIROMETER: Practice your incentive spirometer 4 times per day with 5 repetitions each time.  Using the incentive spirometer can strengthen your muscles between your ribs and help you have a strong cough after surgery.  A more effective cough can help prevent problems with your lungs.    3) STAY HYDRATED: Drink clear liquids up until 2 hours before your surgery. We would like you to purchase a drink such as Gatorade or Ensure Clear (not the milkshake type).  Drink this before bedtime and on the way into the hospital, drink between 8-10 ounces or until you feel hydrated.  Keeping well hydrated leads to your veins being plump, you wake up faster, and you are less likely to be nauseated. Start drinking water as soon as you can after surgery and advance to clear liquids and food as tolerated.  IV fluids contain salt, drinking fluids will minimize the amount of IV fluids you need and decrease the amount of salt you get.    The most  common reason for the patient to be readmitted is dehydration. Staying hydrated after you go home from the hospital is very important.  Ensure or Ensure Clear are good options to keep you hydrated.     4) PAIN MANAGEMENT: If we minimize the amount of opioids and narcotics, and use regional blocks (which numb the area where your surgery is) along with oral pain medications; you will have less side effects of nausea and constipation. Narcotics can slow down your bowels and cause you to stay in the hospital longer.     Our goal is to keep you comfortable; eating and drinking normally and back home safely.     Using an Incentive Spirometer    An incentive spirometer is a device that helps you do deep breathing exercises. These exercises expand your lungs, aid in circulation, and help prevent pneumonia. Deep breathing exercises also help you breathe better and improve the function of your lungs by:    Keeping your lungs clear    Strengthening your breathing muscles    Helping prevent respiratory complications or problems  The incentive spirometer gives you a way to take an active part in your care. A nurse or therapist will teach you breathing exercises. To do these exercises, you will breathe in through your mouth and not your nose. The incentive spirometer only works correctly if you breathe in through your mouth.    Steps to clear lungs  Step 1. Exhale normally. Then, inhale normally.    Relax and breathe out.  Step 2. Place your lips tightly around the mouthpiece.    Make sure the device is upright and not tilted.  Step 3. Inhale as much air as you can through the mouthpiece (don't breath through your nose).    Inhale slowly and deeply.    Hold your breath long enough to keep the balls or disk raised for at least 3 to 5 seconds, or as instructed by your healthcare provider.  Step 4. Repeat the exercise regularly.    Begin using the Incentive Spirometer one week prior to your surgery, 4 times per day-5 times  each.    AFTER YOUR SURGERY  Breathing exercises   Breathing exercises help you recover faster. Take deep breaths and let the air out slowly. This will:     Help you wake up after surgery.    Help prevent complications like pneumonia.  Preventing complications will help you go home sooner.   We may give you a breathing device (incentive spirometer) to encourage you to breathe deeply.   Nausea and vomiting   You may feel sick to your stomach after surgery; if so, let your nurse know.    Pain control:  After surgery, you may have pain. Our goal is to help you manage your pain. Pain medicine will help you feel comfortable enough to do activities that will help you heal.  These activities may include breathing exercises, walking and physical therapy.   To help your health care team treat your pain we will ask: 1) If you have pain  2) where it is located 3) describe your pain in your words  Methods of pain control include medications given by mouth, vein or by nerve block for some surgeries.  Sequential Compression Device (SCD) or Pneumo Boots:  You may need to wear SCD S on your legs or feet. These are wraps connected to a machine that pumps in air and releases it. The repeated pumping helps prevent blood clots from forming.

## 2020-01-01 ENCOUNTER — HOSPITAL ENCOUNTER (OUTPATIENT)
Facility: CLINIC | Age: 75
DRG: 454 | End: 2020-01-01
Attending: ORTHOPAEDIC SURGERY | Admitting: ORTHOPAEDIC SURGERY
Payer: MEDICARE

## 2020-01-06 ENCOUNTER — ANESTHESIA EVENT (OUTPATIENT)
Dept: SURGERY | Facility: CLINIC | Age: 75
DRG: 454 | End: 2020-01-06
Payer: MEDICARE

## 2020-01-07 ENCOUNTER — ANESTHESIA (OUTPATIENT)
Dept: SURGERY | Facility: CLINIC | Age: 75
DRG: 454 | End: 2020-01-07
Payer: MEDICARE

## 2020-01-07 ENCOUNTER — HOSPITAL ENCOUNTER (INPATIENT)
Facility: CLINIC | Age: 75
LOS: 6 days | Discharge: NURSING, REHAB OR LONG-TERM CARE | DRG: 454 | End: 2020-01-13
Attending: ORTHOPAEDIC SURGERY | Admitting: ORTHOPAEDIC SURGERY
Payer: MEDICARE

## 2020-01-07 ENCOUNTER — APPOINTMENT (OUTPATIENT)
Dept: GENERAL RADIOLOGY | Facility: CLINIC | Age: 75
DRG: 454 | End: 2020-01-07
Attending: ORTHOPAEDIC SURGERY
Payer: MEDICARE

## 2020-01-07 DIAGNOSIS — S32.009K PSEUDOARTHROSIS OF LUMBAR SPINE: ICD-10-CM

## 2020-01-07 DIAGNOSIS — Z98.890 S/P SPINAL SURGERY: Primary | ICD-10-CM

## 2020-01-07 DIAGNOSIS — M40.30 FLATBACK SYNDROME: ICD-10-CM

## 2020-01-07 LAB
ABO + RH BLD: NORMAL
ABO + RH BLD: NORMAL
ANGLE RATE OF CLOT STRENGTH: 77.2 DEGREES (ref 53–72)
ANGLE RATE OF CLOT STRENGTH: 77.5 DEGREES (ref 53–72)
APTT PPP: 24 SEC (ref 22–37)
APTT PPP: 25 SEC (ref 22–37)
APTT PPP: 27 SEC (ref 22–37)
APTT PPP: 27 SEC (ref 22–37)
BASE DEFICIT BLDA-SCNC: 0.6 MMOL/L
BASE DEFICIT BLDA-SCNC: 3.2 MMOL/L
BLD GP AB SCN SERPL QL: NORMAL
BLD PROD TYP BPU: NORMAL
BLD UNIT ID BPU: 0
BLOOD BANK CMNT PATIENT-IMP: NORMAL
BLOOD BANK CMNT PATIENT-IMP: NORMAL
BLOOD PRODUCT CODE: NORMAL
BPU ID: NORMAL
CA-I BLD-MCNC: 4.2 MG/DL (ref 4.4–5.2)
CA-I BLD-MCNC: 4.5 MG/DL (ref 4.4–5.2)
CI HYPERCOAGULATION INDEX: 3.9 RATIO (ref 0–3)
CI HYPERCOAGULATION INDEX: 4.4 RATIO (ref 0–3)
ERYTHROCYTE [DISTWIDTH] IN BLOOD BY AUTOMATED COUNT: 13.3 % (ref 10–15)
ERYTHROCYTE [DISTWIDTH] IN BLOOD BY AUTOMATED COUNT: 13.3 % (ref 10–15)
FIBRINOGEN PPP-MCNC: 196 MG/DL (ref 200–420)
FIBRINOGEN PPP-MCNC: 230 MG/DL (ref 200–420)
FIBRINOGEN PPP-MCNC: 254 MG/DL (ref 200–420)
FIBRINOGEN PPP-MCNC: 269 MG/DL (ref 200–420)
G ACTUAL CLOT STRENGTH: 11.2 KD/SC (ref 4.5–11)
G ACTUAL CLOT STRENGTH: 11.4 KD/SC (ref 4.5–11)
GLUCOSE BLD-MCNC: 132 MG/DL (ref 70–99)
GLUCOSE BLD-MCNC: 146 MG/DL (ref 70–99)
GLUCOSE SERPL-MCNC: 103 MG/DL (ref 70–99)
HCO3 BLD-SCNC: 22 MMOL/L (ref 21–28)
HCO3 BLD-SCNC: 24 MMOL/L (ref 21–28)
HCT VFR BLD AUTO: 31.5 % (ref 35–47)
HCT VFR BLD AUTO: 32 % (ref 35–47)
HGB BLD-MCNC: 10.3 G/DL (ref 11.7–15.7)
HGB BLD-MCNC: 11.1 G/DL (ref 11.7–15.7)
HGB BLD-MCNC: 9.9 G/DL (ref 11.7–15.7)
INR PPP: 1.1 (ref 0.86–1.14)
INR PPP: 1.15 (ref 0.86–1.14)
INR PPP: 1.24 (ref 0.86–1.14)
INR PPP: 1.24 (ref 0.86–1.14)
K TIME TO SPEC CLOT STRENGTH: 0.8 MINUTE (ref 1–3)
K TIME TO SPEC CLOT STRENGTH: 0.9 MINUTE (ref 1–3)
LACTATE BLD-SCNC: 1.4 MMOL/L (ref 0.7–2)
LACTATE BLD-SCNC: 2.8 MMOL/L (ref 0.7–2)
LY30 LYSIS AT 30 MINUTES: 0 % (ref 0–8)
LY30 LYSIS AT 30 MINUTES: 0 % (ref 0–8)
LY60 LYSIS AT 60 MINUTES: 0.4 % (ref 0–15)
LY60 LYSIS AT 60 MINUTES: 0.7 % (ref 0–15)
MA MAXIMUM CLOT STRENGTH: 69.2 MM (ref 50–70)
MA MAXIMUM CLOT STRENGTH: 69.6 MM (ref 50–70)
MCH RBC QN AUTO: 30.3 PG (ref 26.5–33)
MCH RBC QN AUTO: 31 PG (ref 26.5–33)
MCHC RBC AUTO-ENTMCNC: 31.4 G/DL (ref 31.5–36.5)
MCHC RBC AUTO-ENTMCNC: 32.2 G/DL (ref 31.5–36.5)
MCV RBC AUTO: 96 FL (ref 78–100)
MCV RBC AUTO: 96 FL (ref 78–100)
NUM BPU REQUESTED: 2
NUM BPU REQUESTED: 5
O2/TOTAL GAS SETTING VFR VENT: 60 %
O2/TOTAL GAS SETTING VFR VENT: 60 %
PCO2 BLD: 36 MM HG (ref 35–45)
PCO2 BLD: 37 MM HG (ref 35–45)
PH BLD: 7.38 PH (ref 7.35–7.45)
PH BLD: 7.42 PH (ref 7.35–7.45)
PLATELET # BLD AUTO: 216 10E9/L (ref 150–450)
PLATELET # BLD AUTO: 244 10E9/L (ref 150–450)
PO2 BLD: 109 MM HG (ref 80–105)
PO2 BLD: 250 MM HG (ref 80–105)
POTASSIUM BLD-SCNC: 3.5 MMOL/L (ref 3.4–5.3)
POTASSIUM BLD-SCNC: 3.7 MMOL/L (ref 3.4–5.3)
POTASSIUM SERPL-SCNC: 4.6 MMOL/L (ref 3.4–5.3)
R TIME UNTIL CLOT FORMS: 2.9 MINUTE (ref 5–10)
R TIME UNTIL CLOT FORMS: 3.5 MINUTE (ref 5–10)
RBC # BLD AUTO: 3.27 10E12/L (ref 3.8–5.2)
RBC # BLD AUTO: 3.32 10E12/L (ref 3.8–5.2)
SODIUM BLD-SCNC: 141 MMOL/L (ref 133–144)
SODIUM BLD-SCNC: 141 MMOL/L (ref 133–144)
SPECIMEN EXP DATE BLD: NORMAL
TRANSFUSION STATUS PATIENT QL: NORMAL
WBC # BLD AUTO: 10.8 10E9/L (ref 4–11)
WBC # BLD AUTO: 17.4 10E9/L (ref 4–11)

## 2020-01-07 PROCEDURE — 25800030 ZZH RX IP 258 OP 636: Performed by: PHYSICIAN ASSISTANT

## 2020-01-07 PROCEDURE — 37000009 ZZH ANESTHESIA TECHNICAL FEE, EACH ADDTL 15 MIN: Performed by: ORTHOPAEDIC SURGERY

## 2020-01-07 PROCEDURE — 25800030 ZZH RX IP 258 OP 636: Performed by: NURSE ANESTHETIST, CERTIFIED REGISTERED

## 2020-01-07 PROCEDURE — 25000125 ZZHC RX 250: Performed by: ANESTHESIOLOGY

## 2020-01-07 PROCEDURE — 25800030 ZZH RX IP 258 OP 636: Performed by: HOSPITALIST

## 2020-01-07 PROCEDURE — 84132 ASSAY OF SERUM POTASSIUM: CPT | Performed by: ORTHOPAEDIC SURGERY

## 2020-01-07 PROCEDURE — 36415 COLL VENOUS BLD VENIPUNCTURE: CPT | Performed by: HOSPITALIST

## 2020-01-07 PROCEDURE — 82803 BLOOD GASES ANY COMBINATION: CPT | Performed by: ORTHOPAEDIC SURGERY

## 2020-01-07 PROCEDURE — 25000128 H RX IP 250 OP 636: Performed by: HOSPITALIST

## 2020-01-07 PROCEDURE — 25000125 ZZHC RX 250: Performed by: NURSE ANESTHETIST, CERTIFIED REGISTERED

## 2020-01-07 PROCEDURE — 0SS004Z REPOSITION LUMBAR VERTEBRAL JOINT WITH INTERNAL FIXATION DEVICE, OPEN APPROACH: ICD-10-PCS | Performed by: ORTHOPAEDIC SURGERY

## 2020-01-07 PROCEDURE — 25000125 ZZHC RX 250: Performed by: PHYSICIAN ASSISTANT

## 2020-01-07 PROCEDURE — 00NT0ZZ RELEASE SPINAL MENINGES, OPEN APPROACH: ICD-10-PCS | Performed by: ORTHOPAEDIC SURGERY

## 2020-01-07 PROCEDURE — 40000344 ZZHCL STATISTIC THAWING COMPONENT: Performed by: ORTHOPAEDIC SURGERY

## 2020-01-07 PROCEDURE — 25000128 H RX IP 250 OP 636: Performed by: ANESTHESIOLOGY

## 2020-01-07 PROCEDURE — 71000016 ZZH RECOVERY PHASE 1 LEVEL 3 FIRST HR: Performed by: ORTHOPAEDIC SURGERY

## 2020-01-07 PROCEDURE — 85396 CLOTTING ASSAY WHOLE BLOOD: CPT | Performed by: ORTHOPAEDIC SURGERY

## 2020-01-07 PROCEDURE — 25000132 ZZH RX MED GY IP 250 OP 250 PS 637: Mod: GY | Performed by: PHYSICIAN ASSISTANT

## 2020-01-07 PROCEDURE — 25000128 H RX IP 250 OP 636: Performed by: NURSE ANESTHETIST, CERTIFIED REGISTERED

## 2020-01-07 PROCEDURE — 85730 THROMBOPLASTIN TIME PARTIAL: CPT | Performed by: ORTHOPAEDIC SURGERY

## 2020-01-07 PROCEDURE — 40000278 XR SURGERY CARM FLUORO LESS THAN 5 MIN: Mod: TC

## 2020-01-07 PROCEDURE — 86900 BLOOD TYPING SEROLOGIC ABO: CPT | Performed by: PHYSICIAN ASSISTANT

## 2020-01-07 PROCEDURE — 36000076 ZZH SURGERY LEVEL 6 EA 15 ADDTL MIN - UMMC: Performed by: ORTHOPAEDIC SURGERY

## 2020-01-07 PROCEDURE — 25000301 ZZH OR RX SURGIFLO W/THROMBIN KIT 2ML 1991 OPNP: Performed by: ORTHOPAEDIC SURGERY

## 2020-01-07 PROCEDURE — 84132 ASSAY OF SERUM POTASSIUM: CPT | Performed by: ANESTHESIOLOGY

## 2020-01-07 PROCEDURE — 85027 COMPLETE CBC AUTOMATED: CPT | Performed by: ORTHOPAEDIC SURGERY

## 2020-01-07 PROCEDURE — 27211020 ZZHC OR CELL SAVER OPNP: Performed by: ORTHOPAEDIC SURGERY

## 2020-01-07 PROCEDURE — 82947 ASSAY GLUCOSE BLOOD QUANT: CPT | Performed by: ORTHOPAEDIC SURGERY

## 2020-01-07 PROCEDURE — 25800030 ZZH RX IP 258 OP 636: Performed by: ANESTHESIOLOGY

## 2020-01-07 PROCEDURE — 20000004 ZZH R&B ICU UMMC

## 2020-01-07 PROCEDURE — 0SG1071 FUSION OF 2 OR MORE LUMBAR VERTEBRAL JOINTS WITH AUTOLOGOUS TISSUE SUBSTITUTE, POSTERIOR APPROACH, POSTERIOR COLUMN, OPEN APPROACH: ICD-10-PCS | Performed by: ORTHOPAEDIC SURGERY

## 2020-01-07 PROCEDURE — 25800030 ZZH RX IP 258 OP 636: Performed by: ORTHOPAEDIC SURGERY

## 2020-01-07 PROCEDURE — P9041 ALBUMIN (HUMAN),5%, 50ML: HCPCS | Performed by: NURSE ANESTHETIST, CERTIFIED REGISTERED

## 2020-01-07 PROCEDURE — 99223 1ST HOSP IP/OBS HIGH 75: CPT | Mod: 24 | Performed by: NURSE PRACTITIONER

## 2020-01-07 PROCEDURE — 85610 PROTHROMBIN TIME: CPT | Performed by: ORTHOPAEDIC SURGERY

## 2020-01-07 PROCEDURE — 25000566 ZZH SEVOFLURANE, EA 15 MIN: Performed by: ORTHOPAEDIC SURGERY

## 2020-01-07 PROCEDURE — 86850 RBC ANTIBODY SCREEN: CPT | Performed by: PHYSICIAN ASSISTANT

## 2020-01-07 PROCEDURE — 82330 ASSAY OF CALCIUM: CPT | Performed by: ORTHOPAEDIC SURGERY

## 2020-01-07 PROCEDURE — C1713 ANCHOR/SCREW BN/BN,TIS/BN: HCPCS | Performed by: ORTHOPAEDIC SURGERY

## 2020-01-07 PROCEDURE — 85384 FIBRINOGEN ACTIVITY: CPT | Performed by: ORTHOPAEDIC SURGERY

## 2020-01-07 PROCEDURE — 82947 ASSAY GLUCOSE BLOOD QUANT: CPT | Performed by: ANESTHESIOLOGY

## 2020-01-07 PROCEDURE — 27210794 ZZH OR GENERAL SUPPLY STERILE: Performed by: ORTHOPAEDIC SURGERY

## 2020-01-07 PROCEDURE — 40000170 ZZH STATISTIC PRE-PROCEDURE ASSESSMENT II: Performed by: ORTHOPAEDIC SURGERY

## 2020-01-07 PROCEDURE — 0BJ08ZZ INSPECTION OF TRACHEOBRONCHIAL TREE, VIA NATURAL OR ARTIFICIAL OPENING ENDOSCOPIC: ICD-10-PCS | Performed by: ORTHOPAEDIC SURGERY

## 2020-01-07 PROCEDURE — 86901 BLOOD TYPING SEROLOGIC RH(D): CPT | Performed by: PHYSICIAN ASSISTANT

## 2020-01-07 PROCEDURE — P9012 CRYOPRECIPITATE EACH UNIT: HCPCS | Performed by: ORTHOPAEDIC SURGERY

## 2020-01-07 PROCEDURE — 25000128 H RX IP 250 OP 636: Performed by: ORTHOPAEDIC SURGERY

## 2020-01-07 PROCEDURE — 0SG804Z FUSION OF LEFT SACROILIAC JOINT WITH INTERNAL FIXATION DEVICE, OPEN APPROACH: ICD-10-PCS | Performed by: ORTHOPAEDIC SURGERY

## 2020-01-07 PROCEDURE — 36000078 ZZH SURGERY LEVEL 6 W FLUORO 1ST 30 MIN - UMMC: Performed by: ORTHOPAEDIC SURGERY

## 2020-01-07 PROCEDURE — 84295 ASSAY OF SERUM SODIUM: CPT | Performed by: ORTHOPAEDIC SURGERY

## 2020-01-07 PROCEDURE — 0QP004Z REMOVAL OF INTERNAL FIXATION DEVICE FROM LUMBAR VERTEBRA, OPEN APPROACH: ICD-10-PCS | Performed by: ORTHOPAEDIC SURGERY

## 2020-01-07 PROCEDURE — 36415 COLL VENOUS BLD VENIPUNCTURE: CPT | Performed by: PHYSICIAN ASSISTANT

## 2020-01-07 PROCEDURE — 0SG00AJ FUSION OF LUMBAR VERTEBRAL JOINT WITH INTERBODY FUSION DEVICE, POSTERIOR APPROACH, ANTERIOR COLUMN, OPEN APPROACH: ICD-10-PCS | Performed by: ORTHOPAEDIC SURGERY

## 2020-01-07 PROCEDURE — 25000128 H RX IP 250 OP 636: Performed by: PHYSICIAN ASSISTANT

## 2020-01-07 PROCEDURE — P9016 RBC LEUKOCYTES REDUCED: HCPCS | Performed by: PHYSICIAN ASSISTANT

## 2020-01-07 PROCEDURE — 0SG704Z FUSION OF RIGHT SACROILIAC JOINT WITH INTERNAL FIXATION DEVICE, OPEN APPROACH: ICD-10-PCS | Performed by: ORTHOPAEDIC SURGERY

## 2020-01-07 PROCEDURE — 8E0WXBG COMPUTER ASSISTED PROCEDURE OF TRUNK REGION, WITH COMPUTERIZED TOMOGRAPHY: ICD-10-PCS | Performed by: ORTHOPAEDIC SURGERY

## 2020-01-07 PROCEDURE — 83605 ASSAY OF LACTIC ACID: CPT | Performed by: ORTHOPAEDIC SURGERY

## 2020-01-07 PROCEDURE — 85018 HEMOGLOBIN: CPT | Performed by: HOSPITALIST

## 2020-01-07 PROCEDURE — 37000008 ZZH ANESTHESIA TECHNICAL FEE, 1ST 30 MIN: Performed by: ORTHOPAEDIC SURGERY

## 2020-01-07 PROCEDURE — 27210995 ZZH RX 272: Performed by: ORTHOPAEDIC SURGERY

## 2020-01-07 PROCEDURE — 0ST20ZZ RESECTION OF LUMBAR VERTEBRAL DISC, OPEN APPROACH: ICD-10-PCS | Performed by: ORTHOPAEDIC SURGERY

## 2020-01-07 DEVICE — IMP SCR MEDT 5.5/6.0MM SOLERA 6.5X40MM MA 55840006540: Type: IMPLANTABLE DEVICE | Site: SPINE LUMBAR | Status: FUNCTIONAL

## 2020-01-07 DEVICE — IMPLANTABLE DEVICE: Type: IMPLANTABLE DEVICE | Site: SPINE LUMBAR | Status: FUNCTIONAL

## 2020-01-07 DEVICE — IMP SCR SET MEDT SOLERA BREAK OFF 5.5MM TI 5540030: Type: IMPLANTABLE DEVICE | Site: SPINE LUMBAR | Status: FUNCTIONAL

## 2020-01-07 DEVICE — IMP SCR MEDT 5.5/6.0MM SOLERA 5.5X50MM MA 55840005550: Type: IMPLANTABLE DEVICE | Site: SPINE LUMBAR | Status: FUNCTIONAL

## 2020-01-07 DEVICE — IMP ROD MEDT SOLERA LINED 5.5X500MM CHR 1555200500: Type: IMPLANTABLE DEVICE | Site: SPINE LUMBAR | Status: FUNCTIONAL

## 2020-01-07 DEVICE — IMP SCR MEDT 5.5/6.0MM SOLERA 6.5X55MM MA 55840006555: Type: IMPLANTABLE DEVICE | Site: SPINE LUMBAR | Status: FUNCTIONAL

## 2020-01-07 RX ORDER — ALBUMIN, HUMAN INJ 5% 5 %
SOLUTION INTRAVENOUS CONTINUOUS PRN
Status: DISCONTINUED | OUTPATIENT
Start: 2020-01-07 | End: 2020-01-07

## 2020-01-07 RX ORDER — FENTANYL CITRATE 50 UG/ML
25-50 INJECTION, SOLUTION INTRAMUSCULAR; INTRAVENOUS
Status: DISCONTINUED | OUTPATIENT
Start: 2020-01-07 | End: 2020-01-07 | Stop reason: HOSPADM

## 2020-01-07 RX ORDER — SODIUM CHLORIDE, SODIUM GLUCONATE, SODIUM ACETATE, POTASSIUM CHLORIDE AND MAGNESIUM CHLORIDE 526; 502; 368; 37; 30 MG/100ML; MG/100ML; MG/100ML; MG/100ML; MG/100ML
INJECTION, SOLUTION INTRAVENOUS CONTINUOUS PRN
Status: DISCONTINUED | OUTPATIENT
Start: 2020-01-07 | End: 2020-01-07

## 2020-01-07 RX ORDER — PANTOPRAZOLE SODIUM 40 MG/1
40 TABLET, DELAYED RELEASE ORAL 2 TIMES DAILY
Status: DISCONTINUED | OUTPATIENT
Start: 2020-01-07 | End: 2020-01-13 | Stop reason: HOSPADM

## 2020-01-07 RX ORDER — MEPERIDINE HYDROCHLORIDE 25 MG/ML
12.5 INJECTION INTRAMUSCULAR; INTRAVENOUS; SUBCUTANEOUS
Status: DISCONTINUED | OUTPATIENT
Start: 2020-01-07 | End: 2020-01-07 | Stop reason: HOSPADM

## 2020-01-07 RX ORDER — CLINDAMYCIN PHOSPHATE 900 MG/50ML
900 INJECTION, SOLUTION INTRAVENOUS
Status: COMPLETED | OUTPATIENT
Start: 2020-01-07 | End: 2020-01-07

## 2020-01-07 RX ORDER — NALOXONE HYDROCHLORIDE 0.4 MG/ML
.1-.4 INJECTION, SOLUTION INTRAMUSCULAR; INTRAVENOUS; SUBCUTANEOUS
Status: DISCONTINUED | OUTPATIENT
Start: 2020-01-07 | End: 2020-01-07

## 2020-01-07 RX ORDER — DEXTROSE MONOHYDRATE, SODIUM CHLORIDE, AND POTASSIUM CHLORIDE 50; 1.49; 4.5 G/1000ML; G/1000ML; G/1000ML
INJECTION, SOLUTION INTRAVENOUS CONTINUOUS
Status: DISCONTINUED | OUTPATIENT
Start: 2020-01-07 | End: 2020-01-08

## 2020-01-07 RX ORDER — METOCLOPRAMIDE HYDROCHLORIDE 5 MG/ML
5 INJECTION INTRAMUSCULAR; INTRAVENOUS EVERY 6 HOURS PRN
Status: DISCONTINUED | OUTPATIENT
Start: 2020-01-07 | End: 2020-01-13 | Stop reason: HOSPADM

## 2020-01-07 RX ORDER — GABAPENTIN 100 MG/1
100 CAPSULE ORAL 3 TIMES DAILY
Status: COMPLETED | OUTPATIENT
Start: 2020-01-07 | End: 2020-01-10

## 2020-01-07 RX ORDER — PROPOFOL 10 MG/ML
25-150 INJECTION, EMULSION INTRAVENOUS CONTINUOUS
Status: DISCONTINUED | OUTPATIENT
Start: 2020-01-07 | End: 2020-01-07 | Stop reason: HOSPADM

## 2020-01-07 RX ORDER — NALOXONE HYDROCHLORIDE 0.4 MG/ML
.1-.4 INJECTION, SOLUTION INTRAMUSCULAR; INTRAVENOUS; SUBCUTANEOUS
Status: DISCONTINUED | OUTPATIENT
Start: 2020-01-07 | End: 2020-01-13 | Stop reason: HOSPADM

## 2020-01-07 RX ORDER — BISACODYL 10 MG
10 SUPPOSITORY, RECTAL RECTAL DAILY PRN
Status: DISCONTINUED | OUTPATIENT
Start: 2020-01-07 | End: 2020-01-13 | Stop reason: HOSPADM

## 2020-01-07 RX ORDER — ONDANSETRON 4 MG/1
4 TABLET, ORALLY DISINTEGRATING ORAL EVERY 30 MIN PRN
Status: DISCONTINUED | OUTPATIENT
Start: 2020-01-07 | End: 2020-01-07 | Stop reason: HOSPADM

## 2020-01-07 RX ORDER — FENTANYL CITRATE 50 UG/ML
INJECTION, SOLUTION INTRAMUSCULAR; INTRAVENOUS PRN
Status: DISCONTINUED | OUTPATIENT
Start: 2020-01-07 | End: 2020-01-07

## 2020-01-07 RX ORDER — ACETAMINOPHEN 325 MG/1
650 TABLET ORAL EVERY 4 HOURS PRN
Status: DISCONTINUED | OUTPATIENT
Start: 2020-01-10 | End: 2020-01-08

## 2020-01-07 RX ORDER — PROPOFOL 10 MG/ML
INJECTION, EMULSION INTRAVENOUS PRN
Status: DISCONTINUED | OUTPATIENT
Start: 2020-01-07 | End: 2020-01-07

## 2020-01-07 RX ORDER — GABAPENTIN 100 MG/1
100 CAPSULE ORAL
Status: DISCONTINUED | OUTPATIENT
Start: 2020-01-07 | End: 2020-01-07 | Stop reason: HOSPADM

## 2020-01-07 RX ORDER — LIDOCAINE 40 MG/G
CREAM TOPICAL
Status: DISCONTINUED | OUTPATIENT
Start: 2020-01-07 | End: 2020-01-13 | Stop reason: HOSPADM

## 2020-01-07 RX ORDER — GABAPENTIN 100 MG/1
100 CAPSULE ORAL 3 TIMES DAILY
Status: DISCONTINUED | OUTPATIENT
Start: 2020-01-07 | End: 2020-01-07

## 2020-01-07 RX ORDER — LIDOCAINE HYDROCHLORIDE 20 MG/ML
INJECTION, SOLUTION INFILTRATION; PERINEURAL PRN
Status: DISCONTINUED | OUTPATIENT
Start: 2020-01-07 | End: 2020-01-07

## 2020-01-07 RX ORDER — HYDROMORPHONE HYDROCHLORIDE 1 MG/ML
.3-.5 INJECTION, SOLUTION INTRAMUSCULAR; INTRAVENOUS; SUBCUTANEOUS EVERY 5 MIN PRN
Status: DISCONTINUED | OUTPATIENT
Start: 2020-01-07 | End: 2020-01-07 | Stop reason: HOSPADM

## 2020-01-07 RX ORDER — CALCIUM CHLORIDE 100 MG/ML
INJECTION INTRAVENOUS; INTRAVENTRICULAR PRN
Status: DISCONTINUED | OUTPATIENT
Start: 2020-01-07 | End: 2020-01-07

## 2020-01-07 RX ORDER — LORAZEPAM 0.5 MG/1
0.5 TABLET ORAL EVERY 6 HOURS PRN
Status: DISCONTINUED | OUTPATIENT
Start: 2020-01-07 | End: 2020-01-13 | Stop reason: HOSPADM

## 2020-01-07 RX ORDER — NALOXONE HYDROCHLORIDE 0.4 MG/ML
.1-.4 INJECTION, SOLUTION INTRAMUSCULAR; INTRAVENOUS; SUBCUTANEOUS
Status: DISCONTINUED | OUTPATIENT
Start: 2020-01-07 | End: 2020-01-07 | Stop reason: HOSPADM

## 2020-01-07 RX ORDER — ONDANSETRON 2 MG/ML
4 INJECTION INTRAMUSCULAR; INTRAVENOUS EVERY 30 MIN PRN
Status: DISCONTINUED | OUTPATIENT
Start: 2020-01-07 | End: 2020-01-07 | Stop reason: HOSPADM

## 2020-01-07 RX ORDER — ATORVASTATIN CALCIUM 40 MG/1
40 TABLET, FILM COATED ORAL AT BEDTIME
Status: DISCONTINUED | OUTPATIENT
Start: 2020-01-07 | End: 2020-01-13 | Stop reason: HOSPADM

## 2020-01-07 RX ORDER — ACETAMINOPHEN 325 MG/1
975 TABLET ORAL EVERY 8 HOURS
Status: COMPLETED | OUTPATIENT
Start: 2020-01-07 | End: 2020-01-10

## 2020-01-07 RX ORDER — CALCIUM CARBONATE 500 MG/1
1000 TABLET, CHEWABLE ORAL 4 TIMES DAILY PRN
Status: DISCONTINUED | OUTPATIENT
Start: 2020-01-07 | End: 2020-01-13 | Stop reason: HOSPADM

## 2020-01-07 RX ORDER — CLINDAMYCIN PHOSPHATE 900 MG/50ML
900 INJECTION, SOLUTION INTRAVENOUS EVERY 8 HOURS
Status: COMPLETED | OUTPATIENT
Start: 2020-01-07 | End: 2020-01-08

## 2020-01-07 RX ORDER — AMOXICILLIN 250 MG
2 CAPSULE ORAL 2 TIMES DAILY
Status: DISCONTINUED | OUTPATIENT
Start: 2020-01-07 | End: 2020-01-13 | Stop reason: HOSPADM

## 2020-01-07 RX ORDER — PROCHLORPERAZINE MALEATE 5 MG
5 TABLET ORAL EVERY 6 HOURS PRN
Status: DISCONTINUED | OUTPATIENT
Start: 2020-01-07 | End: 2020-01-13 | Stop reason: HOSPADM

## 2020-01-07 RX ORDER — DOCUSATE SODIUM 100 MG/1
100 CAPSULE, LIQUID FILLED ORAL 2 TIMES DAILY
Status: DISCONTINUED | OUTPATIENT
Start: 2020-01-07 | End: 2020-01-13 | Stop reason: HOSPADM

## 2020-01-07 RX ORDER — ONDANSETRON 2 MG/ML
INJECTION INTRAMUSCULAR; INTRAVENOUS PRN
Status: DISCONTINUED | OUTPATIENT
Start: 2020-01-07 | End: 2020-01-07

## 2020-01-07 RX ORDER — ONDANSETRON 2 MG/ML
4 INJECTION INTRAMUSCULAR; INTRAVENOUS EVERY 6 HOURS PRN
Status: DISCONTINUED | OUTPATIENT
Start: 2020-01-07 | End: 2020-01-13 | Stop reason: HOSPADM

## 2020-01-07 RX ORDER — SODIUM CHLORIDE 9 MG/ML
INJECTION, SOLUTION INTRAVENOUS CONTINUOUS PRN
Status: DISCONTINUED | OUTPATIENT
Start: 2020-01-07 | End: 2020-01-07

## 2020-01-07 RX ORDER — SODIUM CHLORIDE, SODIUM LACTATE, POTASSIUM CHLORIDE, CALCIUM CHLORIDE 600; 310; 30; 20 MG/100ML; MG/100ML; MG/100ML; MG/100ML
INJECTION, SOLUTION INTRAVENOUS CONTINUOUS
Status: DISCONTINUED | OUTPATIENT
Start: 2020-01-07 | End: 2020-01-07 | Stop reason: HOSPADM

## 2020-01-07 RX ORDER — LIDOCAINE 40 MG/G
CREAM TOPICAL
Status: DISCONTINUED | OUTPATIENT
Start: 2020-01-07 | End: 2020-01-07 | Stop reason: HOSPADM

## 2020-01-07 RX ORDER — CLINDAMYCIN PHOSPHATE 900 MG/50ML
900 INJECTION, SOLUTION INTRAVENOUS SEE ADMIN INSTRUCTIONS
Status: DISCONTINUED | OUTPATIENT
Start: 2020-01-07 | End: 2020-01-07 | Stop reason: HOSPADM

## 2020-01-07 RX ORDER — METHOCARBAMOL 500 MG/1
500 TABLET, FILM COATED ORAL 4 TIMES DAILY PRN
Status: DISCONTINUED | OUTPATIENT
Start: 2020-01-07 | End: 2020-01-13 | Stop reason: HOSPADM

## 2020-01-07 RX ORDER — HYDROXYZINE HYDROCHLORIDE 10 MG/1
10 TABLET, FILM COATED ORAL EVERY 6 HOURS PRN
Status: DISCONTINUED | OUTPATIENT
Start: 2020-01-07 | End: 2020-01-13 | Stop reason: HOSPADM

## 2020-01-07 RX ORDER — POLYETHYLENE GLYCOL 3350 17 G/17G
17 POWDER, FOR SOLUTION ORAL DAILY
Status: DISCONTINUED | OUTPATIENT
Start: 2020-01-07 | End: 2020-01-13 | Stop reason: HOSPADM

## 2020-01-07 RX ORDER — ACETAMINOPHEN 325 MG/1
975 TABLET ORAL ONCE
Status: DISCONTINUED | OUTPATIENT
Start: 2020-01-07 | End: 2020-01-07 | Stop reason: HOSPADM

## 2020-01-07 RX ORDER — FAMOTIDINE 20 MG/1
20 TABLET, FILM COATED ORAL 2 TIMES DAILY
Status: DISCONTINUED | OUTPATIENT
Start: 2020-01-07 | End: 2020-01-07

## 2020-01-07 RX ORDER — VANCOMYCIN HYDROCHLORIDE 1 G/20ML
INJECTION, POWDER, LYOPHILIZED, FOR SOLUTION INTRAVENOUS PRN
Status: DISCONTINUED | OUTPATIENT
Start: 2020-01-07 | End: 2020-01-07 | Stop reason: HOSPADM

## 2020-01-07 RX ORDER — ONDANSETRON 4 MG/1
4 TABLET, ORALLY DISINTEGRATING ORAL EVERY 6 HOURS PRN
Status: DISCONTINUED | OUTPATIENT
Start: 2020-01-07 | End: 2020-01-13 | Stop reason: HOSPADM

## 2020-01-07 RX ORDER — POLYETHYLENE GLYCOL 3350 17 G/17G
17 POWDER, FOR SOLUTION ORAL DAILY
Status: DISCONTINUED | OUTPATIENT
Start: 2020-01-07 | End: 2020-01-07

## 2020-01-07 RX ORDER — METOCLOPRAMIDE 5 MG/1
5 TABLET ORAL EVERY 6 HOURS PRN
Status: DISCONTINUED | OUTPATIENT
Start: 2020-01-07 | End: 2020-01-13 | Stop reason: HOSPADM

## 2020-01-07 RX ORDER — EPHEDRINE SULFATE 50 MG/ML
INJECTION, SOLUTION INTRAMUSCULAR; INTRAVENOUS; SUBCUTANEOUS PRN
Status: DISCONTINUED | OUTPATIENT
Start: 2020-01-07 | End: 2020-01-07

## 2020-01-07 RX ORDER — HYDROMORPHONE HYDROCHLORIDE 2 MG/1
2-4 TABLET ORAL EVERY 4 HOURS PRN
Status: DISCONTINUED | OUTPATIENT
Start: 2020-01-07 | End: 2020-01-13 | Stop reason: HOSPADM

## 2020-01-07 RX ORDER — HYDROMORPHONE HYDROCHLORIDE 1 MG/ML
.3-.5 INJECTION, SOLUTION INTRAMUSCULAR; INTRAVENOUS; SUBCUTANEOUS EVERY 10 MIN PRN
Status: DISCONTINUED | OUTPATIENT
Start: 2020-01-07 | End: 2020-01-07 | Stop reason: HOSPADM

## 2020-01-07 RX ADMIN — PROPOFOL: 10 INJECTION, EMULSION INTRAVENOUS at 11:15

## 2020-01-07 RX ADMIN — Medication 5 MG: at 08:50

## 2020-01-07 RX ADMIN — PROPOFOL 100 MG: 10 INJECTION, EMULSION INTRAVENOUS at 08:33

## 2020-01-07 RX ADMIN — PHENYLEPHRINE HYDROCHLORIDE 50 MCG: 10 INJECTION INTRAVENOUS at 15:00

## 2020-01-07 RX ADMIN — PHENYLEPHRINE HYDROCHLORIDE 100 MCG: 10 INJECTION INTRAVENOUS at 12:54

## 2020-01-07 RX ADMIN — PHENYLEPHRINE HYDROCHLORIDE 1 MCG/KG/MIN: 10 INJECTION INTRAVENOUS at 18:52

## 2020-01-07 RX ADMIN — PHENYLEPHRINE HYDROCHLORIDE: 10 INJECTION INTRAVENOUS at 14:31

## 2020-01-07 RX ADMIN — SODIUM CHLORIDE, POTASSIUM CHLORIDE, SODIUM LACTATE AND CALCIUM CHLORIDE: 600; 310; 30; 20 INJECTION, SOLUTION INTRAVENOUS at 08:23

## 2020-01-07 RX ADMIN — Medication 10 MG: at 12:07

## 2020-01-07 RX ADMIN — PROPOFOL 75 MCG/KG/MIN: 10 INJECTION, EMULSION INTRAVENOUS at 08:45

## 2020-01-07 RX ADMIN — REMIFENTANIL HYDROCHLORIDE 0.06 MCG/KG/MIN: 1 INJECTION, POWDER, LYOPHILIZED, FOR SOLUTION INTRAVENOUS at 13:03

## 2020-01-07 RX ADMIN — FENTANYL CITRATE 25 MCG: 50 INJECTION INTRAMUSCULAR; INTRAVENOUS at 16:49

## 2020-01-07 RX ADMIN — ROCURONIUM BROMIDE 50 MG: 10 INJECTION INTRAVENOUS at 08:33

## 2020-01-07 RX ADMIN — PHENYLEPHRINE HYDROCHLORIDE 0.3 MCG/KG/MIN: 10 INJECTION INTRAVENOUS at 17:19

## 2020-01-07 RX ADMIN — LIDOCAINE HYDROCHLORIDE 1 MG/KG/HR: 20 INJECTION, SOLUTION INTRAVENOUS at 22:35

## 2020-01-07 RX ADMIN — TRANEXAMIC ACID 10 MG/KG/HR: 100 INJECTION, SOLUTION INTRAVENOUS at 10:00

## 2020-01-07 RX ADMIN — CLINDAMYCIN PHOSPHATE 900 MG: 900 INJECTION, SOLUTION INTRAVENOUS at 09:00

## 2020-01-07 RX ADMIN — SUGAMMADEX 200 MG: 100 INJECTION, SOLUTION INTRAVENOUS at 16:04

## 2020-01-07 RX ADMIN — ALBUMIN HUMAN: 0.05 INJECTION, SOLUTION INTRAVENOUS at 13:09

## 2020-01-07 RX ADMIN — Medication 5 MG: at 08:46

## 2020-01-07 RX ADMIN — Medication 10 MG: at 12:04

## 2020-01-07 RX ADMIN — PHENYLEPHRINE HYDROCHLORIDE 200 MCG: 10 INJECTION INTRAVENOUS at 14:39

## 2020-01-07 RX ADMIN — PHENYLEPHRINE HYDROCHLORIDE 100 MCG: 10 INJECTION INTRAVENOUS at 14:57

## 2020-01-07 RX ADMIN — PHENYLEPHRINE HYDROCHLORIDE 200 MCG: 10 INJECTION INTRAVENOUS at 13:09

## 2020-01-07 RX ADMIN — LIDOCAINE HYDROCHLORIDE 100 MG: 20 INJECTION, SOLUTION INFILTRATION; PERINEURAL at 08:33

## 2020-01-07 RX ADMIN — Medication 5 MG: at 10:41

## 2020-01-07 RX ADMIN — Medication: at 17:42

## 2020-01-07 RX ADMIN — PHENYLEPHRINE HYDROCHLORIDE 0.2 MCG/KG/MIN: 10 INJECTION INTRAVENOUS at 09:43

## 2020-01-07 RX ADMIN — ALBUMIN HUMAN: 0.05 INJECTION, SOLUTION INTRAVENOUS at 14:37

## 2020-01-07 RX ADMIN — PHENYLEPHRINE HYDROCHLORIDE 0.4 MCG/KG/MIN: 10 INJECTION INTRAVENOUS at 13:09

## 2020-01-07 RX ADMIN — GABAPENTIN 100 MG: 100 CAPSULE ORAL at 20:04

## 2020-01-07 RX ADMIN — MIDAZOLAM 1 MG: 1 INJECTION INTRAMUSCULAR; INTRAVENOUS at 08:23

## 2020-01-07 RX ADMIN — Medication 10 MG: at 11:43

## 2020-01-07 RX ADMIN — PHENYLEPHRINE HYDROCHLORIDE 100 MCG: 10 INJECTION INTRAVENOUS at 14:09

## 2020-01-07 RX ADMIN — Medication 10 MG: at 13:05

## 2020-01-07 RX ADMIN — CALCIUM CHLORIDE 500 MG: 100 INJECTION, SOLUTION INTRAVENOUS at 15:22

## 2020-01-07 RX ADMIN — Medication 2 G: at 08:40

## 2020-01-07 RX ADMIN — Medication 5 MG: at 08:48

## 2020-01-07 RX ADMIN — ONDANSETRON 4 MG: 2 INJECTION INTRAMUSCULAR; INTRAVENOUS at 17:24

## 2020-01-07 RX ADMIN — LIDOCAINE HYDROCHLORIDE: 20 INJECTION, SOLUTION INTRAVENOUS at 13:09

## 2020-01-07 RX ADMIN — ALBUMIN HUMAN: 0.05 INJECTION, SOLUTION INTRAVENOUS at 12:13

## 2020-01-07 RX ADMIN — PANTOPRAZOLE SODIUM 40 MG: 40 TABLET, DELAYED RELEASE ORAL at 20:04

## 2020-01-07 RX ADMIN — HYDROMORPHONE HYDROCHLORIDE 0.25 MG: 1 INJECTION, SOLUTION INTRAMUSCULAR; INTRAVENOUS; SUBCUTANEOUS at 15:39

## 2020-01-07 RX ADMIN — LIDOCAINE HYDROCHLORIDE 1 MG/KG/HR: 20 INJECTION, SOLUTION INTRAVENOUS at 08:45

## 2020-01-07 RX ADMIN — PHENYLEPHRINE HYDROCHLORIDE 200 MCG: 10 INJECTION INTRAVENOUS at 13:42

## 2020-01-07 RX ADMIN — SODIUM CHLORIDE 500 ML: 9 INJECTION, SOLUTION INTRAVENOUS at 18:21

## 2020-01-07 RX ADMIN — SODIUM CHLORIDE: 9 INJECTION, SOLUTION INTRAVENOUS at 08:39

## 2020-01-07 RX ADMIN — PHENYLEPHRINE HYDROCHLORIDE 200 MCG: 10 INJECTION INTRAVENOUS at 14:13

## 2020-01-07 RX ADMIN — PHENYLEPHRINE HYDROCHLORIDE 200 MCG: 10 INJECTION INTRAVENOUS at 14:31

## 2020-01-07 RX ADMIN — PHENYLEPHRINE HYDROCHLORIDE 200 MCG: 10 INJECTION INTRAVENOUS at 14:45

## 2020-01-07 RX ADMIN — PHENYLEPHRINE HYDROCHLORIDE 100 MCG: 10 INJECTION INTRAVENOUS at 13:48

## 2020-01-07 RX ADMIN — PHENYLEPHRINE HYDROCHLORIDE 200 MCG: 10 INJECTION INTRAVENOUS at 14:35

## 2020-01-07 RX ADMIN — Medication 5 MG: at 10:30

## 2020-01-07 RX ADMIN — FENTANYL CITRATE 25 MCG: 50 INJECTION INTRAMUSCULAR; INTRAVENOUS at 16:45

## 2020-01-07 RX ADMIN — PHENYLEPHRINE HYDROCHLORIDE 200 MCG: 10 INJECTION INTRAVENOUS at 14:27

## 2020-01-07 RX ADMIN — PHENYLEPHRINE HYDROCHLORIDE 100 MCG: 10 INJECTION INTRAVENOUS at 13:07

## 2020-01-07 RX ADMIN — CLINDAMYCIN PHOSPHATE 900 MG: 900 INJECTION, SOLUTION INTRAVENOUS at 15:00

## 2020-01-07 RX ADMIN — SODIUM CHLORIDE, POTASSIUM CHLORIDE, SODIUM LACTATE AND CALCIUM CHLORIDE: 600; 310; 30; 20 INJECTION, SOLUTION INTRAVENOUS at 10:37

## 2020-01-07 RX ADMIN — Medication 10 MG: at 11:06

## 2020-01-07 RX ADMIN — CLINDAMYCIN PHOSPHATE 900 MG: 900 INJECTION, SOLUTION INTRAVENOUS at 22:35

## 2020-01-07 RX ADMIN — FENTANYL CITRATE 75 MCG: 50 INJECTION, SOLUTION INTRAMUSCULAR; INTRAVENOUS at 08:33

## 2020-01-07 RX ADMIN — PHENYLEPHRINE HYDROCHLORIDE 100 MCG: 10 INJECTION INTRAVENOUS at 12:07

## 2020-01-07 RX ADMIN — Medication 5 MG: at 11:27

## 2020-01-07 RX ADMIN — PHENYLEPHRINE HYDROCHLORIDE 100 MCG: 10 INJECTION INTRAVENOUS at 13:56

## 2020-01-07 RX ADMIN — ATORVASTATIN CALCIUM 40 MG: 40 TABLET, FILM COATED ORAL at 20:05

## 2020-01-07 RX ADMIN — POTASSIUM CHLORIDE, DEXTROSE MONOHYDRATE AND SODIUM CHLORIDE: 150; 5; 450 INJECTION, SOLUTION INTRAVENOUS at 17:18

## 2020-01-07 RX ADMIN — PHENYLEPHRINE HYDROCHLORIDE 100 MCG: 10 INJECTION INTRAVENOUS at 13:02

## 2020-01-07 RX ADMIN — Medication 10 MG: at 11:25

## 2020-01-07 RX ADMIN — FENTANYL CITRATE 25 MCG: 50 INJECTION, SOLUTION INTRAMUSCULAR; INTRAVENOUS at 09:32

## 2020-01-07 RX ADMIN — ACETAMINOPHEN 975 MG: 325 TABLET, FILM COATED ORAL at 20:04

## 2020-01-07 RX ADMIN — PHENYLEPHRINE HYDROCHLORIDE 100 MCG: 10 INJECTION INTRAVENOUS at 13:05

## 2020-01-07 RX ADMIN — PHENYLEPHRINE HYDROCHLORIDE 100 MCG: 10 INJECTION INTRAVENOUS at 12:51

## 2020-01-07 RX ADMIN — TRANEXAMIC ACID 2985 MG: 100 INJECTION, SOLUTION INTRAVENOUS at 08:40

## 2020-01-07 RX ADMIN — CALCIUM CHLORIDE 500 MG: 100 INJECTION, SOLUTION INTRAVENOUS at 15:14

## 2020-01-07 RX ADMIN — ONDANSETRON 4 MG: 2 INJECTION INTRAMUSCULAR; INTRAVENOUS at 16:04

## 2020-01-07 RX ADMIN — PHENYLEPHRINE HYDROCHLORIDE 100 MCG: 10 INJECTION INTRAVENOUS at 12:27

## 2020-01-07 RX ADMIN — SODIUM CHLORIDE: 9 INJECTION, SOLUTION INTRAVENOUS at 10:37

## 2020-01-07 RX ADMIN — Medication 10 MG: at 08:55

## 2020-01-07 RX ADMIN — REMIFENTANIL HYDROCHLORIDE 0.08 MCG/KG/MIN: 1 INJECTION, POWDER, LYOPHILIZED, FOR SOLUTION INTRAVENOUS at 08:45

## 2020-01-07 RX ADMIN — SODIUM CHLORIDE, SODIUM GLUCONATE, SODIUM ACETATE, POTASSIUM CHLORIDE AND MAGNESIUM CHLORIDE: 526; 502; 368; 37; 30 INJECTION, SOLUTION INTRAVENOUS at 11:29

## 2020-01-07 RX ADMIN — HYDROMORPHONE HYDROCHLORIDE 0.25 MG: 1 INJECTION, SOLUTION INTRAMUSCULAR; INTRAVENOUS; SUBCUTANEOUS at 15:33

## 2020-01-07 RX ADMIN — KETAMINE HYDROCHLORIDE 10 MG/HR: 100 INJECTION INTRAMUSCULAR; INTRAVENOUS at 08:45

## 2020-01-07 RX ADMIN — PHENYLEPHRINE HYDROCHLORIDE 200 MCG: 10 INJECTION INTRAVENOUS at 13:32

## 2020-01-07 RX ADMIN — PHENYLEPHRINE HYDROCHLORIDE 50 MCG: 10 INJECTION INTRAVENOUS at 15:05

## 2020-01-07 RX ADMIN — METOCLOPRAMIDE 5 MG: 5 INJECTION, SOLUTION INTRAMUSCULAR; INTRAVENOUS at 19:54

## 2020-01-07 ASSESSMENT — MIFFLIN-ST. JEOR: SCORE: 1448.38

## 2020-01-07 NOTE — LETTER
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]  Pseudoarthrosis of lumbar spine [S32.009K]  Admit Date/Time: 2020  6:03 AM  Discharge Date: 2020 11:30 AM  Payor Source: Payor: MEDICARE / Plan: MEDICARE / Product Type: Medicare /          Reason for Communication Hand-off Referral: Other Continuity of care    Discharge Plan: [facility]       Concern for non-adherence with plan of care:   No  Discharge Needs Assessment:  Needs      Most Recent Value   Equipment Currently Used at Home  -- [has walker, LH shoe horn, RTS, shower chair]        Future Appointments   Date Time Provider Department Center   2020  6:30 PM UR PT OVERFLOW URPT Reading   2020 11:00 AM Segundo Samuels MD [facility]   3/19/2020 11:00 AM Segundo Samuels MD [facility]       Any outstanding tests or procedures:        Referrals     Future Labs/Procedures    Occupational Therapy Adult Consult     Comments:    Evaluate and treat as clinically indicated.    Reason:  Postoperative spine surgery    Physical Therapy Adult Consult     Comments:    Evaluate and treat as clinically indicated.    Reason:  Postoperative spine surgery            DANIELLE Tapia  5A/10A Ortho/Med/Surg [facility] Adult ED  Phone: [phone] Pager: [phone]

## 2020-01-07 NOTE — BRIEF OP NOTE
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 Segundo Moyer   Assistants:    Savanah Mckenzie PA-C  Anesthesia:   General  EBL:    1525mL with 725mL returned via cellsaver and 1 unit PRBC and 1 unit cryo given intra-op.  Total IV Fluids:  See Anesthesia Record  Specimens:   None  Findings:   See Operative Dictation      Assessment and Plan: Carmelita Mina is a 74 year old female with PMH including sinusitis, HTN, HLD, hx of systemic lupus erythematosus, GERD, asthma, flatback syndrome now s/p above procedure on 1/7/2020 with Dr. Samuels and Dr. Moyer.     Abril Primary  Activity: Up with assist until independent. No excessive bending or twisting. No lifting >10 lbs x 6 weeks. No Vianca lift for transfers.   Weight bearing status: WBAT.  Pain management:   IV lidocaine: discontinue at 8am on POD#2 or earlier if not tolerated  Transition from IV to PO as tolerated. No NSAIDs   Antibiotics: Clindamycin x 24 hours.  Diet: Begin with clear fluids and progress diet as tolerated.   DVT prophylaxis: SCDs only. No chemical DVT ppx needed.  Imaging: XR Upright scoliosis films PTDC - ordered.  Labs: CBC, BMP POD#1.  Bracing/Splinting: None.  Dressings: Keep Aquacel c/d/i x 7 days.  Drains: Hemovac. Document output per shift, will be discontinued at Orthopedic Surgery discretion.  Ibarra catheter: Remove POD#1.   Physical Therapy/Occupational Therapy: Eval and treat.  Cultures: none.    Consults: Hospitalist.  Follow-up: Clinic with Dr. Samuels in 6 weeks with repeat x-rays.   Disposition: Pending progress with therapies, pain control on orals, and medical stability, anticipate discharge to  home/TCU on POD #3-5.        I assisted with positioning, prepping and draping, graft preparation and placement and closure.    Savanah Mckenzie PA-C  Pager #[phone]    Please page me at [phone] with any questions/concerns during regular weekday hours before 4pm. If there is no response, if it is a weekend, or if it is during evening hours then please page the orthopaedic surgery resident on call.    Implants:   Implant Name Type Inv. Item Serial No.  Lot No. LRB No. Used   7.5 x 40mm dual preeti multi-axial screw    [employer] [id number] N/A 2   6.5 x 55mm dual preeti multi-axil screw    [employer] [id number] N/A 1   6.5 x 50mm dual preeti multi-axil screw    [employer] [id number] N/A 1   7.0mm x 90mm iFuse implant    [employer] [id number] N/A 1   IMP SCR MEDT 5.5/6.0MM SOLERA 6.5X55MM MA [id number] Metallic Hardware/[place] IMP SCR MEDT 5.5/6.0MM SOLERA 6.5X55MM MA [id number]  [employer]  N/A 2   IMP SCR SET MEDT SOLERA BREAK OFF 5.5MM TI [id number] Metallic Hardware/[place] IMP SCR SET MEDT SOLERA BREAK OFF 5.5MM TI [id number]  [employer] INC  N/A 16   IMP SCR MEDT 5.5/6.0MM SOLERA 6.5X50MM MA [id number] Metallic Hardware/[place] IMP SCR MEDT 5.5/6.0MM SOLERA 6.5X50MM MA [id number]  [employer] INC  N/A 1   IMP SCR MEDT 5.5/6.0MM SOLERA 9.5X40MM MA [id number] Metallic Hardware/[place] IMP SCR MEDT 5.5/6.0MM SOLERA 9.5X40MM MA [id number]  [employer]  N/A 1   IMP SCR MEDT 5.5/6.0MM SOLERA 5.5X50MM MA [id number] Metallic Hardware/[place] IMP SCR MEDT 5.5/6.0MM SOLERA 5.5X50MM MA [id number]  [employer]  N/A 2   IMP SCR MEDT 5.5/6.0MM SOLERA 6.5X40MM MA [id number] Metallic Hardware/[place] IMP SCR MEDT 5.5/6.0MM SOLERA 6.5X40MM MA [id number]  [employer]  N/A 1   iFuse Implant System 7.0mm x 90mm implant     [employer] [id number] N/A 1   iFuse Implant System ifuse 3-D 7.0mm x 70mm Implant    [employer] [id number] N/A 1   iFuse Impant 7.0mm x 70mm implant    [employer] [id number] N/A 1   11mm x 22mm x 0deg Interbody Fusion  Device    [employer] [id number] N/A 2   9.5 x 90 Ballast    [employer]  N/A 2   10 Screws, 1 Crosslink, 2 rods, and 10 Set screws      N/A 1   IMP BLAKE MEDT SOLERA LINED 5.8M230CT CHR [id number] Metallic Hardware/[place] IMP BLAKE MEDT SOLERA LINED 5.6C297DY CHR [id number]  [employer]  N/A 2

## 2020-01-07 NOTE — LETTER
Health Information Management Services               Recipient: Blanca Ramos [facility]          Sender: DANIELLE Ramirez [phone]          Date: January 13, 2020  Patient Name:  Carmelita Mina  Routing Message:  Referral          The documents accompanying this notice contain confidential information belonging to the sender.  This information is intended only for the use of the individual or entity named above.  The authorized recipient of this information is prohibited from disclosing this information to any other party and is required to destroy the information after its stated need has been fulfilled, unless otherwise required by state law.      If you are not the intended recipient, you are hereby notified that any disclosure, copy, distribution or action taken in reliance on the contents of these documents is strictly prohibited.  If you have received this document in error, please return it by fax to [phone] with a note on the cover sheet explaining why you are returning it (e.g. not your patient, not your provider, etc.).  If you need further assistance, please call [employer] Centralized Transcription at [phone].  Documents may also be returned by mail to [facility] [employer], , [facility] [address], [place], [place] [address].

## 2020-01-07 NOTE — OR NURSING
Dr. Le in to see patient, OK to discharge from PACU, handoff to ICU RN's.     A-line not working, OK to remove.

## 2020-01-07 NOTE — ANESTHESIA CARE TRANSFER NOTE
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    Anesthesia Type:   General     Note:  Airway :Face Mask  Patient transferred to:PACU  Comments: Pt transported to pacu, A & O, spontaneous rr on FM 02, lidocaine and phenylephrine gtts infusing, keyonna waveform continues to be dampened, flushes with ease, unable to draw.  vss, Handoff Report: Identifed the Patient, Identified the Reponsible Provider, Reviewed the pertinent medical history, Discussed the surgical course, Reviewed Intra-OP anesthesia mangement and issues during anesthesia, Set expectations for post-procedure period and Allowed opportunity for questions and acknowledgement of understanding      Vitals: (Last set prior to Anesthesia Care Transfer)    CRNA VITALS  1/7/2020 1543 - 1/7/2020 1637      1/7/2020             NIBP:  118/85    Ht Rate:  74    Temp:  36.4  C (97.5  F)    SpO2:  99 %    EKG:  Sinus rhythm                Electronically Signed By: KEAGAN Gaytan CRNA  January 7, 2020  4:37 PM

## 2020-01-07 NOTE — LETTER
Health Information Management Services               Recipient: [facility]          Sender: DANIELLE Ramirez [phone]          Date: January 13, 2020  Patient Name:  Carmelita Mina  Routing Message:  Discharge Orders   [id number]          The documents accompanying this notice contain confidential information belonging to the sender.  This information is intended only for the use of the individual or entity named above.  The authorized recipient of this information is prohibited from disclosing this information to any other party and is required to destroy the information after its stated need has been fulfilled, unless otherwise required by state law.      If you are not the intended recipient, you are hereby notified that any disclosure, copy, distribution or action taken in reliance on the contents of these documents is strictly prohibited.  If you have received this document in error, please return it by fax to [phone] with a note on the cover sheet explaining why you are returning it (e.g. not your patient, not your provider, etc.).  If you need further assistance, please call [employer] Centralized Transcription at [phone].  Documents may also be returned by mail to [employer], , [facility] [address], [place], [place] [address].

## 2020-01-07 NOTE — PROVIDER NOTIFICATION
Abdominal bruising, present prior to surgery per report; Skin tear/breakdown right abdominal fold; bruising to left a-line site, unchanged per CRNA.

## 2020-01-07 NOTE — ANESTHESIA PREPROCEDURE EVALUATION
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]  Pseudoarthrosis of lumbar spine [S32.009K]   Procedure(s):  Lumbar 1 to pelvis revision transforaminal lumbar interbody fusion, pedicle subtraction osteotomies Lumbar 4 or Lumbar 5, Lorenzo-Flowers osteotomy Lumbar 2,  SacroIliac joint fusion with  bedrock     Past Medical History:   Diagnosis Date     Asthma      Back pain      Flatback syndrome      GERD (gastroesophageal reflux disease)      H/O systemic lupus erythematosus (SLE) (H)      HLD (hyperlipidemia)      HTN (hypertension)      Pseudoarthrosis of lumbar spine      Sinusitis       Past Surgical History:   Procedure Laterality Date     BUNIONECTOMY Bilateral      DENTAL SURGERY      extraction     HYSTERECTOMY      Same time as lap minoo, appy and fibroid removal     L2-5 TRANSFORAMINAL LUMBAR INTERBODY FUSION L5-S1 POSTERIOR LUMBAR INTERBODY FUSION  2016     L5-S1 pseudoarthrosis revision with BMP and iliac screws  2017     LAPAROSCOPIC APPENDECTOMY  1985    Same time as lap minoo, appy and fibroid removal     LAPAROSCOPIC CHOLECYSTECTOMY  1985    Same time as lap minoo, appy and fibroid removal     LAPAROSCOPY TUBAL LIGATION       LAPAROTOMY UTERUS FIBROID TUMOR RESECTION/MYOMECTOMY      Same time as lap minoo, appy and fibroid removal     SINUS SURGERY  1988    nasal polyps          Anesthesia Evaluation     . Pt has had prior anesthetic.            ROS/MED HX    ENT/Pulmonary:     (+)Intermittent asthma , . .    Neurologic:     (+)other neuro flat back    Cardiovascular:     (+) hypertension----. : . . . :. .      (-) CAD and CHF   METS/Exercise Tolerance:  >4 METS   Hematologic:         Musculoskeletal:  - neg musculoskeletal ROS       GI/Hepatic:     (+) GERD Asymptomatic on medication,       Renal/Genitourinary:  - ROS Renal  "section negative       Endo:  - neg endo ROS       Psychiatric:  - neg psychiatric ROS       Infectious Disease:  - neg infectious disease ROS       Malignancy:      - no malignancy   Other:    (+) H/O Chronic Pain,                       PHYSICAL EXAM:   Mental Status/Neuro: A/A/O   Airway: Facies: Feasible  Mallampati: I  Mouth/Opening: Full  TM distance: > 6 cm  Neck ROM: Full   Respiratory: Auscultation: CTAB     Resp. Rate: Normal     Resp. Effort: Normal      CV: Rhythm: Regular  Rate: Age appropriate  Heart: Normal Sounds  Edema: None   Comments:      Dental: Normal Dentition                LABS:  CBC:   Lab Results   Component Value Date    WBC 5.6 12/13/2019    HGB 12.1 12/13/2019    HCT 39.3 12/13/2019     12/13/2019     BMP:   Lab Results   Component Value Date     12/13/2019    POTASSIUM 4.6 01/07/2020    POTASSIUM 4.5 12/13/2019    CHLORIDE 105 12/13/2019    CO2 31 12/13/2019    BUN 15 12/13/2019    CR 0.96 12/13/2019     (H) 01/07/2020     (H) 12/13/2019     COAGS: No results found for: PTT, INR, FIBR  POC: No results found for: BGM, HCG, HCGS  OTHER:   Lab Results   Component Value Date    SARWAT 9.3 12/13/2019        Preop Vitals    BP Readings from Last 3 Encounters:   01/07/20 (!) 142/90   12/13/19 138/86    Pulse Readings from Last 3 Encounters:   01/07/20 77   12/13/19 65      Resp Readings from Last 3 Encounters:   01/07/20 16   12/13/19 14    SpO2 Readings from Last 3 Encounters:   01/07/20 99%   12/13/19 100%      Temp Readings from Last 1 Encounters:   01/07/20 36.8  C (98.2  F) (Oral)    Ht Readings from Last 1 Encounters:   01/07/20 1.575 m (5' 2.01\")      Wt Readings from Last 1 Encounters:   01/07/20 99.5 kg (219 lb 5.7 oz)    Estimated body mass index is 40.11 kg/m  as calculated from the following:    Height as of this encounter: 1.575 m (5' 2.01\").    Weight as of this encounter: 99.5 kg (219 lb 5.7 oz).     LDA:        Assessment:   ASA SCORE: 2    H&P: History " and physical reviewed and following examination; no interval change.   Smoking Status:  Non-Smoker/Unknown   NPO Status: NPO Appropriate     Plan:   Anes. Type:  General   Pre-Medication: None   Induction:  IV (Standard)   Airway: ETT; Oral   Access/Monitoring: PIV; A-Line; CVL   Maintenance: Balanced     Postop Plan:   Postop Pain: Opioids  Postop Sedation/Airway: Not planned     PONV Management:   Adult Risk Factors: Female, Non-Smoker, Postop Opioids   Prevention: Ondansetron, Dopamine Antagonist     CONSENT: Direct conversation   Plan and risks discussed with: Patient   Blood Products: Consented (ALL Blood Products)                   Ahmet Mckinney MD

## 2020-01-07 NOTE — CONSULTS
[facility], [place]  Consult  Critical Care Service  Date of Admission:  1/7/2020  Date of Service (when I saw the patient): 01/07/20  Assessment & Plan   Carmelita Mina is a 74 year old female past medical history of hypertension, hyperlipidemia, systemic lupus, GERD who presents for redo L1 to pelvis posterior fusion by Dr. Samuels on January 7, 2020.  she is being admitted to the stabilization unit for the lidocaine infusion protocol.  The critical care services been consulted for ongoing medical management.    Neuro  1. Acute on chronic pain  Plan:  -- Scheduled acetaminophen, cooling pad, Lidoderm, PTA gabapentin with PRN dilaudid and oxycodone or IV hydromorphone PCA.  Defer pain management to orthopedic team.  --Hold PTA ibuprofen and other NSAIDs  --Lidocaine infusion 1 mg/kg/hr. Monitor for signs of lidocaine toxicity    CV  1. Hypotension   2. Hx HTN  3. HLD  Plan:  -- On Phenylphrine immediately post-op, likely related to sedation. Goal MAP >65  -- SBP less than 180  -- Hold perioperatively, PTA furosemide    Resp:  1. Asthma  Plan:  -- Aggressive pulmonary hygiene  --Supplemental oxygen to maintain oxygen saturation greater than 92%  --Resume PTA albuterol inhaler    GI/Nutrition GERD  1. No prior hx  Plan:  --ADAT  --Resume PTA PPI    Renal  1. No prior hx.  Baseline creatinine appears to be 0.96  Plan:  --monitor function and electrolytes as needed with replacement per ICU protocols.   -- generally avoid nephrotoxic agents such as NSAID, IV contrast unless specifically required  -- adjust medications as needed for renal clearance  -- follow I/O's as appropriate.  -- maintain euvolemia    ID  1. Perioperative antimicrobial prophylaxis  Plan: Ancef per orthopedics  --     Endocrine  1. Stress Hyperglycemia  Plan:  --  Insulin sliding scale if indicated  -- Keep BG  <180 for optimal healing    Heme:  1. Acute blood loss anemia, EBL 1425 with 725 returned and Cell Saver in  addition to 1 PRBC and 1 unit of cryo  2. Britton then again  Plan:  -- Monitor CBC  -- Transfuse to keep  hgb  > 7.0, fibrinogen greater than 200    MSK  1. S/p L1 to pelvis fusion  Plan:  --No activity restrictions per orthopedics  --PT OT consult when appropriate  Skin  1. Lumbar incision  Plan: -- Wound care per orthopedics      General cares:  DVT Prophylaxis: Defer to primary service  GI Prophylaxis: PPI  Restraints: Restraints for medical healing needed: NO  Family update by me today: No  Current lines are required for patient management  Access:      Abdifatah Clancy    Time Spent on this Encounter   Billing:  I spent 35 minutes bedside and on the inpatient unit today managing the critical care of Carmelita Mina in relation to the issues listed in this note.    Code Status   Full Code    Primary Care Physician   Salvatore Rios    Chief Complaint   S/p lumbar fusion        Past Medical History    I have reviewed this patient's medical history and updated it with pertinent information if needed.   Past Medical History:   Diagnosis Date     Asthma      Back pain      Flatback syndrome      GERD (gastroesophageal reflux disease)      H/O systemic lupus erythematosus (SLE) (H)      HLD (hyperlipidemia)      HTN (hypertension)      Pseudoarthrosis of lumbar spine      Sinusitis        Past Surgical History   I have reviewed this patient's surgical history and updated it with pertinent information if needed.  Past Surgical History:   Procedure Laterality Date     BUNIONECTOMY Bilateral      DENTAL SURGERY      extraction     HYSTERECTOMY  1985    Same time as lap minoo, appy and fibroid removal     L2-5 TRANSFORAMINAL LUMBAR INTERBODY FUSION L5-S1 POSTERIOR LUMBAR INTERBODY FUSION  03/08/2016     L5-S1 pseudoarthrosis revision with BMP and iliac screws  11/07/2017     LAPAROSCOPIC APPENDECTOMY  1985    Same time as lap minoo, appy and fibroid removal     LAPAROSCOPIC CHOLECYSTECTOMY  1985    Same time  "as lap minoo, appy and fibroid removal     LAPAROSCOPY TUBAL LIGATION       LAPAROTOMY UTERUS FIBROID TUMOR RESECTION/MYOMECTOMY  1985    Same time as lap minoo, appy and fibroid removal     SINUS SURGERY  1988    nasal polyps       Prior to Admission Medications   Prior to Admission Medications   Prescriptions Last Dose Informant Patient Reported? Taking?   Ibuprofen-diphenhydrAMINE Cit (ADVIL PM PO) Past Week at Unknown time  Yes Yes   Sig: Take 2 tablets by mouth At Bedtime   Multiple Vitamins-Minerals (MULTIVITAL) TABS Past Week at Unknown time  Yes Yes   Sig: Take 1 tablet by mouth every morning   Probiotic Product (PROBIOTIC-10 PO) Past Week at Unknown time  Yes Yes   Sig: Take 1 capsule by mouth every morning   acetaminophen (TYLENOL) 325 MG tablet 1/7/2020 at 0500  Yes Yes   Sig: Take 650 mg by mouth every morning    albuterol (VENTOLIN HFA) 108 (90 Base) MCG/ACT inhaler   Yes No   Sig: Inhale 2 puffs into the lungs    atorvastatin (LIPITOR) 40 MG tablet 1/6/2020 at 2200  Yes Yes   Sig: Take 40 mg by mouth At Bedtime    furosemide (LASIX) 20 MG tablet 1/6/2020 at 0800  Yes Yes   Sig: Take 1 tablet by mouth every morning    gabapentin (NEURONTIN) 100 MG capsule 1/7/2020 at 0500  Yes Yes   Sig: Take 200 mg by mouth 2 times daily    ibuprofen (ADVIL/MOTRIN) 200 MG capsule Past Week at Unknown time  Yes Yes   Sig: Take 1-2 capsules by mouth as needed (PT last dose a month ago 12/14/19)    pantoprazole (PROTONIX) 40 MG EC tablet 1/7/2020 at 0500  Yes Yes   Sig: Take 40 mg by mouth 2 times daily    valACYclovir (VALTREX) 500 MG tablet   Yes No   Sig: Take 1 tablet by mouth as needed (PT last dose approx a month ago 12/13/19)       Facility-Administered Medications: None     Allergies   Allergies   Allergen Reactions     Cefaclor Anaphylaxis     Naproxen Headache, Muscle Pain (Myalgia), Nausea and Vomiting and Hives     Penicillins Unknown and Other (See Comments)     \"I was a kid\" doesn't remember  Childhood " reaction. Patient unsure of reaction.       Atropine Other (See Comments)     Talwin atropine combination medication  Cardiac arrest     Pentazocine Other (See Comments)     Talwin atropine combination medication caused cardiac arrest.     Seasonal Allergies Other (See Comments)     Corn, springtime allergens causing sneezing.     Tuberculin Purified Protein Derivative Other (See Comments)     swelling     Erythromycin Rash       Social History   I have reviewed this patient's social history and updated it with pertinent information if needed. Carmelita Mina  reports that she has never smoked. She has never used smokeless tobacco. She reports current alcohol use. She reports that she does not use drugs.    Family History   I have reviewed this patient's family history and updated it with pertinent information if needed.   Family History   Problem Relation Age of Onset     Cardiovascular Mother         PCI with stent     Cerebrovascular Disease Mother         aneurysm      Cardiovascular Sister         MI        Review of Systems   Review of systems not obtained due to patient factors - sedation    Physical Exam   Temp: 98.2  F (36.8  C) Temp src: Oral Temp  Min: 98.2  F (36.8  C)  Max: 98.2  F (36.8  C) BP: (!) 142/90 Pulse: 77   Resp: 16 SpO2: 99 % O2 Device: None (Room air)    Vital Signs with Ranges  Temp:  [98.2  F (36.8  C)] 98.2  F (36.8  C)  Pulse:  [77] 77  Resp:  [16] 16  BP: (142)/(90) 142/90  SpO2:  [99 %] 99 %  219 lbs 5.72 oz    GEN: Lethargic   EYES: PERRL, Anicteric sclera.   HEENT:  Normocephalic, atraumatic, trachea midline  CV: RRR, no gallops, rubs, or murmurs  PULM/CHEST: Clear breath sounds bilaterally without rhonchi, crackles or wheeze, symmetric chest rise  GI: soft, non-tender, no rebound tenderness or guarding, no masses  : romo catheter in place, urine yellow and clear  EXTREMITIES: no peripheral edema, moving all extremities, peripheral pulses intact  NEURO: Cranial nerves II-XII  grossly intact, no motor-sensory deficits noted  SKIN: No rashes, sores or ulcerations  PSYCH:  CALVIN, lethargic   Imaging personally reviewed:  ECG, cxr     Data   Results for orders placed or performed during the hospital encounter of 01/07/20 (from the past 24 hour(s))   Potassium   Result Value Ref Range    Potassium 4.6 3.4 - 5.3 mmol/L   Glucose   Result Value Ref Range    Glucose 103 (H) 70 - 99 mg/dL   Arterial Panel   Result Value Ref Range    pH Arterial 7.42 7.35 - 7.45 pH    pCO2 Arterial 36 35 - 45 mm Hg    pO2 Arterial 109 (H) 80 - 105 mm Hg    Bicarbonate Arterial 24 21 - 28 mmol/L    Base Deficit Art 0.6 mmol/L    FIO2 60     Sodium 141 133 - 144 mmol/L    Potassium 3.5 3.4 - 5.3 mmol/L    Hemoglobin 10.3 (L) 11.7 - 15.7 g/dL    Glucose 132 (H) 70 - 99 mg/dL    Calcium Ionized Whole Blood 4.5 4.4 - 5.2 mg/dL   Fibrinogen activity   Result Value Ref Range    Fibrinogen 269 200 - 420 mg/dL   INR   Result Value Ref Range    INR 1.10 0.86 - 1.14   Lactic acid whole blood   Result Value Ref Range    Lactic Acid 1.4 0.7 - 2.0 mmol/L   Partial thromboplastin time   Result Value Ref Range    PTT 25 22 - 37 sec   CBC with platelets   Result Value Ref Range    WBC 10.8 4.0 - 11.0 10e9/L    RBC Count 3.27 (L) 3.8 - 5.2 10e12/L    Hemoglobin 9.9 (L) 11.7 - 15.7 g/dL    Hematocrit 31.5 (L) 35.0 - 47.0 %    MCV 96 78 - 100 fl    MCH 30.3 26.5 - 33.0 pg    MCHC 31.4 (L) 31.5 - 36.5 g/dL    RDW 13.3 10.0 - 15.0 %    Platelet Count 244 150 - 450 10e9/L   Fibrinogen activity   Result Value Ref Range    Fibrinogen 254 200 - 420 mg/dL   INR   Result Value Ref Range    INR 1.15 (H) 0.86 - 1.14   Partial thromboplastin time   Result Value Ref Range    PTT 27 22 - 37 sec   Fibrinogen activity   Result Value Ref Range    Fibrinogen 230 200 - 420 mg/dL   INR   Result Value Ref Range    INR 1.24 (H) 0.86 - 1.14   Partial thromboplastin time   Result Value Ref Range    PTT 27 22 - 37 sec   Arterial Panel   Result Value Ref Range     pH Arterial 7.38 7.35 - 7.45 pH    pCO2 Arterial 37 35 - 45 mm Hg    pO2 Arterial 250 (H) 80 - 105 mm Hg    Bicarbonate Arterial 22 21 - 28 mmol/L    Base Deficit Art 3.2 mmol/L    FIO2 60     Sodium 141 133 - 144 mmol/L    Potassium 3.7 3.4 - 5.3 mmol/L    Hemoglobin 10.3 (L) 11.7 - 15.7 g/dL    Glucose 146 (H) 70 - 99 mg/dL    Calcium Ionized Whole Blood 4.2 (L) 4.4 - 5.2 mg/dL   CBC with platelets   Result Value Ref Range    WBC 17.4 (H) 4.0 - 11.0 10e9/L    RBC Count 3.32 (L) 3.8 - 5.2 10e12/L    Hemoglobin 10.3 (L) 11.7 - 15.7 g/dL    Hematocrit 32.0 (L) 35.0 - 47.0 %    MCV 96 78 - 100 fl    MCH 31.0 26.5 - 33.0 pg    MCHC 32.2 31.5 - 36.5 g/dL    RDW 13.3 10.0 - 15.0 %    Platelet Count 216 150 - 450 10e9/L   Fibrinogen activity   Result Value Ref Range    Fibrinogen 196 (L) 200 - 420 mg/dL   INR   Result Value Ref Range    INR 1.24 (H) 0.86 - 1.14   Lactic acid whole blood   Result Value Ref Range    Lactic Acid 2.8 (H) 0.7 - 2.0 mmol/L   Partial thromboplastin time   Result Value Ref Range    PTT 24 22 - 37 sec   Cryoprecipitate prepare order unit   Result Value Ref Range    Blood Component Type Cryoprecipitate     Units Ordered 5    Blood component   Result Value Ref Range    Unit Number [id number]     Blood Component Type 5 cryoprecipitate units pooled     Division Number 00     Status of Unit Released to care unit 01/07/2020 1516     Blood Product Code [id number]     Unit Status ISS    XR Surgery KIMMY L/T 5 Min Fluoro    Narrative    This exam was marked as non-reportable because it will not be read by a   radiologist or a [facility] non-radiologist provider.

## 2020-01-08 ENCOUNTER — APPOINTMENT (OUTPATIENT)
Dept: PHYSICAL THERAPY | Facility: CLINIC | Age: 75
DRG: 454 | End: 2020-01-08
Attending: ORTHOPAEDIC SURGERY
Payer: MEDICARE

## 2020-01-08 LAB
ANION GAP SERPL CALCULATED.3IONS-SCNC: 5 MMOL/L (ref 3–14)
ANION GAP SERPL CALCULATED.3IONS-SCNC: 7 MMOL/L (ref 3–14)
BASOPHILS # BLD AUTO: 0 10E9/L (ref 0–0.2)
BASOPHILS NFR BLD AUTO: 0.1 %
BUN SERPL-MCNC: 23 MG/DL (ref 7–30)
BUN SERPL-MCNC: 24 MG/DL (ref 7–30)
CALCIUM SERPL-MCNC: 7.6 MG/DL (ref 8.5–10.1)
CALCIUM SERPL-MCNC: 7.6 MG/DL (ref 8.5–10.1)
CHLORIDE SERPL-SCNC: 108 MMOL/L (ref 94–109)
CHLORIDE SERPL-SCNC: 111 MMOL/L (ref 94–109)
CO2 SERPL-SCNC: 23 MMOL/L (ref 20–32)
CO2 SERPL-SCNC: 24 MMOL/L (ref 20–32)
CREAT SERPL-MCNC: 1.49 MG/DL (ref 0.52–1.04)
CREAT SERPL-MCNC: 1.49 MG/DL (ref 0.52–1.04)
CREAT UR-MCNC: 82 MG/DL
DIFFERENTIAL METHOD BLD: ABNORMAL
EOSINOPHIL # BLD AUTO: 0 10E9/L (ref 0–0.7)
EOSINOPHIL NFR BLD AUTO: 0.1 %
ERYTHROCYTE [DISTWIDTH] IN BLOOD BY AUTOMATED COUNT: 14 % (ref 10–15)
GFR SERPL CREATININE-BSD FRML MDRD: 34 ML/MIN/{1.73_M2}
GFR SERPL CREATININE-BSD FRML MDRD: 34 ML/MIN/{1.73_M2}
GLUCOSE SERPL-MCNC: 128 MG/DL (ref 70–99)
GLUCOSE SERPL-MCNC: 172 MG/DL (ref 70–99)
HCT VFR BLD AUTO: 30.2 % (ref 35–47)
HGB BLD-MCNC: 8.9 G/DL (ref 11.7–15.7)
HGB BLD-MCNC: 9.3 G/DL (ref 11.7–15.7)
IMM GRANULOCYTES # BLD: 0.1 10E9/L (ref 0–0.4)
IMM GRANULOCYTES NFR BLD: 0.8 %
LYMPHOCYTES # BLD AUTO: 0.7 10E9/L (ref 0.8–5.3)
LYMPHOCYTES NFR BLD AUTO: 6.2 %
MCH RBC QN AUTO: 30.6 PG (ref 26.5–33)
MCHC RBC AUTO-ENTMCNC: 30.8 G/DL (ref 31.5–36.5)
MCV RBC AUTO: 99 FL (ref 78–100)
MONOCYTES # BLD AUTO: 0.6 10E9/L (ref 0–1.3)
MONOCYTES NFR BLD AUTO: 5.9 %
NEUTROPHILS # BLD AUTO: 9.1 10E9/L (ref 1.6–8.3)
NEUTROPHILS NFR BLD AUTO: 86.9 %
NRBC # BLD AUTO: 0 10*3/UL
NRBC BLD AUTO-RTO: 0 /100
OSMOLALITY SERPL: 288 MMOL/KG (ref 280–301)
OSMOLALITY UR: 417 MMOL/KG (ref 100–1200)
PLATELET # BLD AUTO: 120 10E9/L (ref 150–450)
POTASSIUM SERPL-SCNC: 4.6 MMOL/L (ref 3.4–5.3)
POTASSIUM SERPL-SCNC: 5.6 MMOL/L (ref 3.4–5.3)
RBC # BLD AUTO: 3.04 10E12/L (ref 3.8–5.2)
SODIUM SERPL-SCNC: 138 MMOL/L (ref 133–144)
SODIUM SERPL-SCNC: 140 MMOL/L (ref 133–144)
SODIUM UR-SCNC: 47 MMOL/L
UUN UR-MCNC: 498 MG/DL
UUN/CREAT 24H UR: 6 G/G CR
WBC # BLD AUTO: 10.5 10E9/L (ref 4–11)

## 2020-01-08 PROCEDURE — 85018 HEMOGLOBIN: CPT | Performed by: PHYSICIAN ASSISTANT

## 2020-01-08 PROCEDURE — 83935 ASSAY OF URINE OSMOLALITY: CPT | Performed by: PHYSICIAN ASSISTANT

## 2020-01-08 PROCEDURE — 12000001 ZZH R&B MED SURG/OB UMMC

## 2020-01-08 PROCEDURE — 99223 1ST HOSP IP/OBS HIGH 75: CPT | Performed by: INTERNAL MEDICINE

## 2020-01-08 PROCEDURE — 83930 ASSAY OF BLOOD OSMOLALITY: CPT | Performed by: PHYSICIAN ASSISTANT

## 2020-01-08 PROCEDURE — 25800030 ZZH RX IP 258 OP 636: Performed by: PHYSICIAN ASSISTANT

## 2020-01-08 PROCEDURE — 80048 BASIC METABOLIC PNL TOTAL CA: CPT | Performed by: PHYSICIAN ASSISTANT

## 2020-01-08 PROCEDURE — 25000128 H RX IP 250 OP 636: Performed by: PHYSICIAN ASSISTANT

## 2020-01-08 PROCEDURE — 36415 COLL VENOUS BLD VENIPUNCTURE: CPT | Performed by: ORTHOPAEDIC SURGERY

## 2020-01-08 PROCEDURE — 25000132 ZZH RX MED GY IP 250 OP 250 PS 637: Mod: GY | Performed by: NURSE PRACTITIONER

## 2020-01-08 PROCEDURE — 97161 PT EVAL LOW COMPLEX 20 MIN: CPT | Mod: GP | Performed by: PHYSICAL THERAPIST

## 2020-01-08 PROCEDURE — 80048 BASIC METABOLIC PNL TOTAL CA: CPT | Performed by: ORTHOPAEDIC SURGERY

## 2020-01-08 PROCEDURE — 25000128 H RX IP 250 OP 636: Performed by: ORTHOPAEDIC SURGERY

## 2020-01-08 PROCEDURE — 97530 THERAPEUTIC ACTIVITIES: CPT | Mod: GP | Performed by: PHYSICAL THERAPIST

## 2020-01-08 PROCEDURE — 25000132 ZZH RX MED GY IP 250 OP 250 PS 637: Mod: GY | Performed by: PHYSICIAN ASSISTANT

## 2020-01-08 PROCEDURE — 25000125 ZZHC RX 250: Performed by: PHYSICIAN ASSISTANT

## 2020-01-08 PROCEDURE — 36415 COLL VENOUS BLD VENIPUNCTURE: CPT | Performed by: PHYSICIAN ASSISTANT

## 2020-01-08 PROCEDURE — 84300 ASSAY OF URINE SODIUM: CPT | Performed by: PHYSICIAN ASSISTANT

## 2020-01-08 PROCEDURE — 97110 THERAPEUTIC EXERCISES: CPT | Mod: GP | Performed by: PHYSICAL THERAPIST

## 2020-01-08 PROCEDURE — 97116 GAIT TRAINING THERAPY: CPT | Mod: GP | Performed by: PHYSICAL THERAPIST

## 2020-01-08 PROCEDURE — 25800030 ZZH RX IP 258 OP 636

## 2020-01-08 PROCEDURE — 25800030 ZZH RX IP 258 OP 636: Performed by: PEDIATRICS

## 2020-01-08 PROCEDURE — 85025 COMPLETE CBC W/AUTO DIFF WBC: CPT | Performed by: ORTHOPAEDIC SURGERY

## 2020-01-08 PROCEDURE — 99207 ZZC APP CREDIT; MD BILLING SHARED VISIT: CPT | Performed by: PHYSICIAN ASSISTANT

## 2020-01-08 PROCEDURE — 25800030 ZZH RX IP 258 OP 636: Performed by: ORTHOPAEDIC SURGERY

## 2020-01-08 PROCEDURE — 84540 ASSAY OF URINE/UREA-N: CPT | Performed by: PHYSICIAN ASSISTANT

## 2020-01-08 RX ORDER — GABAPENTIN 400 MG/1
400 CAPSULE ORAL 2 TIMES DAILY
COMMUNITY
End: 2020-01-28

## 2020-01-08 RX ORDER — IBUPROFEN 200 MG
400 TABLET ORAL 2 TIMES DAILY PRN
Status: ON HOLD | COMMUNITY
End: 2020-01-09

## 2020-01-08 RX ORDER — FUROSEMIDE 20 MG
20 TABLET ORAL EVERY MORNING
Status: DISCONTINUED | OUTPATIENT
Start: 2020-01-08 | End: 2020-01-08

## 2020-01-08 RX ORDER — SODIUM CHLORIDE 9 MG/ML
INJECTION, SOLUTION INTRAVENOUS
Status: COMPLETED
Start: 2020-01-08 | End: 2020-01-08

## 2020-01-08 RX ORDER — ALBUTEROL SULFATE 90 UG/1
2 AEROSOL, METERED RESPIRATORY (INHALATION) EVERY 6 HOURS PRN
Status: DISCONTINUED | OUTPATIENT
Start: 2020-01-08 | End: 2020-01-13 | Stop reason: HOSPADM

## 2020-01-08 RX ADMIN — GABAPENTIN 100 MG: 100 CAPSULE ORAL at 14:26

## 2020-01-08 RX ADMIN — HYDROMORPHONE HYDROCHLORIDE 2 MG: 2 TABLET ORAL at 17:26

## 2020-01-08 RX ADMIN — SODIUM CHLORIDE 500 ML: 9 INJECTION, SOLUTION INTRAVENOUS at 14:28

## 2020-01-08 RX ADMIN — CALCIUM CARBONATE (ANTACID) CHEW TAB 500 MG 1000 MG: 500 CHEW TAB at 22:40

## 2020-01-08 RX ADMIN — GABAPENTIN 100 MG: 100 CAPSULE ORAL at 07:36

## 2020-01-08 RX ADMIN — PANTOPRAZOLE SODIUM 40 MG: 40 TABLET, DELAYED RELEASE ORAL at 19:08

## 2020-01-08 RX ADMIN — DOCUSATE SODIUM 100 MG: 100 CAPSULE, LIQUID FILLED ORAL at 07:36

## 2020-01-08 RX ADMIN — HYDROXYZINE HYDROCHLORIDE 10 MG: 10 TABLET, FILM COATED ORAL at 19:08

## 2020-01-08 RX ADMIN — DOCUSATE SODIUM 100 MG: 100 CAPSULE, LIQUID FILLED ORAL at 19:08

## 2020-01-08 RX ADMIN — ACETAMINOPHEN 975 MG: 325 TABLET, FILM COATED ORAL at 19:08

## 2020-01-08 RX ADMIN — ACETAMINOPHEN 975 MG: 325 TABLET, FILM COATED ORAL at 14:26

## 2020-01-08 RX ADMIN — LIDOCAINE HYDROCHLORIDE 1 MG/KG/HR: 20 INJECTION, SOLUTION INTRAVENOUS at 06:49

## 2020-01-08 RX ADMIN — HYDROMORPHONE HYDROCHLORIDE 2 MG: 2 TABLET ORAL at 23:21

## 2020-01-08 RX ADMIN — SODIUM CHLORIDE 500 ML: 9 INJECTION, SOLUTION INTRAVENOUS at 00:38

## 2020-01-08 RX ADMIN — ACETAMINOPHEN 975 MG: 325 TABLET, FILM COATED ORAL at 03:13

## 2020-01-08 RX ADMIN — LIDOCAINE HYDROCHLORIDE 1 MG/KG/HR: 20 INJECTION, SOLUTION INTRAVENOUS at 22:40

## 2020-01-08 RX ADMIN — SENNOSIDES AND DOCUSATE SODIUM 2 TABLET: 8.6; 5 TABLET ORAL at 19:08

## 2020-01-08 RX ADMIN — ATORVASTATIN CALCIUM 40 MG: 40 TABLET, FILM COATED ORAL at 21:50

## 2020-01-08 RX ADMIN — PANTOPRAZOLE SODIUM 40 MG: 40 TABLET, DELAYED RELEASE ORAL at 07:36

## 2020-01-08 RX ADMIN — GABAPENTIN 100 MG: 100 CAPSULE ORAL at 19:08

## 2020-01-08 RX ADMIN — FUROSEMIDE 20 MG: 20 TABLET ORAL at 07:44

## 2020-01-08 RX ADMIN — ONDANSETRON 4 MG: 2 INJECTION INTRAMUSCULAR; INTRAVENOUS at 10:38

## 2020-01-08 RX ADMIN — LIDOCAINE HYDROCHLORIDE 1 MG/KG/HR: 20 INJECTION, SOLUTION INTRAVENOUS at 15:18

## 2020-01-08 RX ADMIN — CALCIUM CARBONATE (ANTACID) CHEW TAB 500 MG 1000 MG: 500 CHEW TAB at 17:26

## 2020-01-08 RX ADMIN — POTASSIUM CHLORIDE, DEXTROSE MONOHYDRATE AND SODIUM CHLORIDE: 150; 5; 450 INJECTION, SOLUTION INTRAVENOUS at 04:36

## 2020-01-08 RX ADMIN — CLINDAMYCIN PHOSPHATE 900 MG: 900 INJECTION, SOLUTION INTRAVENOUS at 05:54

## 2020-01-08 ASSESSMENT — ACTIVITIES OF DAILY LIVING (ADL)
ADLS_ACUITY_SCORE: 14
SWALLOWING: 0-->SWALLOWS FOODS/LIQUIDS WITHOUT DIFFICULTY
ADLS_ACUITY_SCORE: 14
NUMBER_OF_TIMES_PATIENT_HAS_FALLEN_WITHIN_LAST_SIX_MONTHS: 1
BATHING: 0-->INDEPENDENT
COGNITION: 0 - NO COGNITION ISSUES REPORTED
TOILETING: 0-->INDEPENDENT
RETIRED_COMMUNICATION: 0-->UNDERSTANDS/COMMUNICATES WITHOUT DIFFICULTY
AMBULATION: 0-->INDEPENDENT
DRESS: 0-->INDEPENDENT
ADLS_ACUITY_SCORE: 16
FALL_HISTORY_WITHIN_LAST_SIX_MONTHS: YES
RETIRED_EATING: 0-->INDEPENDENT
TRANSFERRING: 0-->INDEPENDENT

## 2020-01-08 ASSESSMENT — PAIN DESCRIPTION - DESCRIPTORS
DESCRIPTORS: SORE
DESCRIPTORS: SORE

## 2020-01-08 NOTE — CONSULTS
[facility], [facility] Progress Note - Hospitalist Service       Date of Admission:  1/7/2020  Assessment & Plan   Carmelita Mina is a 74 year old female with history of SLE, HTN, GERD, HLD, asthma, and chronic pain who was admitted to ICU 1/7/2020 following redo L1 to pelvis posterior fusion per Dr. Samuels of orthopedics. Transferred to the medical floor 1/8/2020 for ongoing care.     Pseudarthrosis, broken instrumentation, flat back syndromes/p redo L1-pelvis posterior spinal fusion: 1/7 per Dr. Samuels. H/o L2-L5 spinal fusion 2016 c/b broken hardware now s/p revision 11/2017. EBL as below. Transferred out of the SICU to the floor 1/8/20.  - Ortho primary   - Pain management: scheduled gabapentin and tylenol, prn PO dilaudid, prn methocarbamol, and dilaudid PCA, Lidocaine gtt  - Bowel regimen  - SCDs DVT PPX    Nonoliguric SHAYLEE on CKD II: BL Cr not quite clear, likely around 0.9-1.0. Elevated to 1.49 1/8/20. BUN 23. Has been on IVF. Possible prerenal etiology 2/2 hypotension. UOP ok making post-renal etiology less likely   - FEUrea, urine osm, serum osm, urine Na  - Strict I&Os, daily standing weights   - Hold IVF for now given maintenance fluids over 24 hours, tolerating diet, unknown EF  - Hold lasix  - Renally dose meds avoid nephrotoxics    Hyperkalemia: K 5.6 (not hemolyzed) in the setting of IVF with KCl. Likely iatrogenic, SHAYLEE could be contributing as well. BL CKD II  - Stop IVF  - Repeat BMP stat    Acute blood loss anemia: BL hgb appears to be normal. EBL 1425 ml with 725 returned via Cell Saver. S/p 1U RBC and 1 unit cryo. Hgb 93 (11.1)  - Keep hgb >7, fibrinogen >200  - Repeat hgb with repeat BMP now     HTN: PTA on lasix which was resumed 1/8/2020. Hypotensive to 85/67 overnight with good response to IV bolus. Most recent /54  - Hold lasix given SHAYLEE and ongoing slightly soft pressures  - Hold off on further IVF for now    Possible new murmur: II/VI murmur  noted on exam. Asymptomatic. Denies prior knowledge of murmur. No prior echo noted  - Echo 1/9    L IV infiltration: New edema in left hand. Worse with IV flush. Very likely 2/2 IV infiltration. Unlikely DVT given worsening with IV flush. Contact medicine if worsening edema, pain, new dyspnea, new hypoxia, new hypoxia    Other Medical Issues:  GERD: Continue PPI  HLD: Continue statin  Asthma: Resume PTA albuterol   SLE: Stable, denies flare. No PTA meds        Diet: Regular Diet Adult    DVT Prophylaxis: Defer to primary service  Romo Catheter: in place, indication: Strict 1-2 Hour I&O  Code Status: Full Code    Disposition Plan   Expected discharge: per primary team ,     The patient's care was discussed with the patient and supervising physician, Dr. Sylvia Alvarez PA-C  Hospitalist Service  [facility], [place]    ______________________________________________________________________    Interval History   Feeling well. Has romo, denies urinary symptoms. Pain adequately controlled. No gas yet but tolerating diet. Denies N/V. No dyspnea, chest pain, palpitations. Reports new swelling in the left hand. No fever or chills     Data reviewed today: I reviewed all medications, new labs and imaging results over the last 24 hours    Physical Exam   Vital Signs: Temp: 97.8  F (36.6  C) Temp src: Oral BP: 118/54 Pulse: 64 Heart Rate: 78 Resp: 9 SpO2: 99 % O2 Device: None (Room air) Oxygen Delivery: 2 LPM  Weight: 219 lbs 5.72 oz  General Appearance: Alert and oriented x3, pleasant  Respiratory: CTAB without wheezing or rales  Cardiovascular: RRR, S1, S2. II/VI murmur noted  GI: Abdomen soft, non-tender with active bowel sounds. No guarding or rebound   Skin: No rash or jaundice   Other: Left hand and dorsum of wrist with 1+ pitting edema. No edema in the R hand or BLE. Distal pulses palpable      Data   Recent Labs   Lab 01/08/20  0722 01/07/20 2020 01/07/20  1415 01/07/20  1316  01/07/20  1155 01/07/20  1008   WBC 10.5  --  17.4*  --  10.8  --    HGB 9.3* 11.1* 10.3*  10.3*  --  9.9* 10.3*   MCV 99  --  96  --  96  --    *  --  216  --  244  --    INR  --   --  1.24* 1.24* 1.15* 1.10     --  141  --   --  141   POTASSIUM 5.6*  --  3.7  --   --  3.5   CHLORIDE 111*  --   --   --   --   --    CO2 24  --   --   --   --   --    BUN 23  --   --   --   --   --    CR 1.49*  --   --   --   --   --    ANIONGAP 5  --   --   --   --   --    SARWAT 7.6*  --   --   --   --   --    *  --  146*  --   --  132*     Recent Results (from the past 24 hour(s))   XR Surgery KIMMY L/T 5 Min Fluoro    Narrative    This exam was marked as non-reportable because it will not be read by a   radiologist or a [facility] non-radiologist provider.

## 2020-01-08 NOTE — PLAN OF CARE
Major Shift Events:  Darnell off since 1900, did require 500 ml bolus for soft BP and low UO. BP stable, UO remains marginal. No neuro changes, exam intact. C/o pain in back radiating down right leg, but overall well controlled with Dilaudid PCA and lidocaine gtt. Reglan given X1 for nausea, with relief. Tolerating RA with sats > 92%, HR 70's, NSR. Daughter updated via telephone   Plan:Tx to 10A     For vital signs and complete assessments, please see documentation flowsheets.      Makayla Leon RN on 1/8/2020 at 6:10 AM

## 2020-01-08 NOTE — OP NOTE
Procedure Date: 01/07/2020      PREOPERATIVE DIAGNOSES:   1.  Flat back syndrome.   2.  Pseudoarthrosis.   3.  Broken instrumentation.   4.  History of previous multiple spinal surgeries at outside hospitals including laminectomies and attempted instrumented fusion with iliac instrumentation.      POSTOPERATIVE DIAGNOSES:   1.  Flat back syndrome.   2.  Pseudoarthrosis.   3.  Broken instrumentation.   4.  History of previous multiple spinal surgeries at outside hospitals including laminectomies and attempted instrumented fusion with iliac instrumentation.      PROCEDURE PERFORMED:   1.  Posterior spinal fusion L1-L2 and L3-L5.   2.  Pedicle subtraction osteotomy L4 with modifier 22 for twice normal operative length and difficulty due to significant scar from previous surgery.   3.  Lorenzo-Flowers osteotomy, bilateral L1-L2.   4.  Transforaminal lumbar interbody fusion, L1-L2.   5.  Insertion structural intervertebral device, bilateral L1-L2.   6.  Removal and reinsertion of spinal instrumentation L2-S1.   7.  Pelvic fixation.   8.  Bilateral open sacroiliac joint fusion with bedrock.   9.  Segmental spinal instrumentation, L1-L2.   10.  Use of image-guided surgery.   11.  Direct lumbar puncture at L1-L2 for injection of intrathecal substance for pain control.      SURGEON:  Segundo Samuels MD.      COSURGEON:  Wen Moyer MD.      ASSISTANT:  Savanah Mckenzie PA-C.  Please note no qualified residents were available to assist in surgery.      A dual surgeon approach was indicated to minimize operative time and blood loss due to the magnitude of the deformity correction required with the significant history of previous instrumentation and decompressive laminectomies increasing the difficulty of epidural scar dissection.      INDICATIONS FOR SURGERY:  Carmelita Mina is a very pleasant 70-year-old female with a history of previous decompressive laminectomies and attempted spinal fusion at outside hospital for back and  leg pain.  She has had pseudoarthrosis in the past and was revised and then developed pseudoarthrosis again with a fractured preeti on the right.  She had a significant sagittal imbalance with a pelvic incidence of approximately 67 and a lumbar lordosis of less than 37 for approximately 30-degree mismatch.  She had rigid spinal segmented instrumentation and arthrodesis from L2 through the sacrum with suspected right-sided pseudoarthrosis.  She failed to improve with conservative measures and was counseled extensively as to risks versus benefits of operative intervention and elected to proceed.      OPERATIVE COURSE AND INTRAOPERATIVE FINDINGS:  The patient was identified and informed consent was verified.  She was taken to operating room #17 where general endotracheal anesthesia was induced.  A Ibarra catheter was placed after the induction of anesthesia, perioperative antibiotics were administered within 1 hour of skin incision, and a tranexamic acid bolus and infusion as well as a lidocaine infusion was begun prior to skin incision.  The patient was positioned prone on the ProAxis table and all extremities points were padded.  She was prepped and draped in standard sterile fashion.  She underwent a brief period of neuromuscular relaxation to assist with the surgical exposure.  An intraoperative timeout was performed.  Her midline skin incision was opened with a #10 blade scalpel and was extended in a cephalad and caudad manner.  There was a large seroma cavity encountered deep to the fascia.  This was irrigated away.  There was a glistening subcutaneous seroma lining, which we would subsequently remove prior to closure, it in order to facilitate healing and prevent seroma, recurrence.  The patient's previously existing instrumentation from L2 through the ileum was progressively exposed.  There was significantly adherent scar tissue and exposure was difficult because of this and additionally difficult because of the  lateral location of the iliac screws.  We removed the set plugs from the Amirite.comium system and then removed the rods.  The preeti was broken at the L4-S1 level on the right.  We then removed the existing pedicle screws and replaced these after tapping to assess the screw diameter appropriateness with Medtronic Solera system screws.  The screw sizes that were replaced are as follows:  Bilateral L2, 6.5 x 55 mm, bilateral L3, 6.5 x 50 mm on the left of the dual head screw and 6.5 x 55 mm, right of the dual head screw.  No pedicle screws were replaced at L4 because of the intended pedicle subtraction osteotomy.  Please note we had to place a dual headed screws again at L5 left 7.5 x 40 mm, and right 7.5 x 40 mm.  At S1, we placed 9.5 x 40 mm on the right.  We then attached a spinous process tracker and used the Stealth and a CT spin to place navigated instrumentation.  We placed a De Julio pedicle screws using Stealth at L1, placing screws using a sharp tipped awl to find the pedicle trajectory and then create the trajectory followed by under tapping, followed by placement of screw sizes with the Medtronic Solera system.  The 5.5 x 50 screws were placed bilaterally at L1.  There was no previously existing screw on the right at L3 and we placed a De Julio screw here.  This was 6.5 x 55 mm.  There was no previously existing screw on the left at S1.  There was a fragmented screw within the pedicle itself.  We therefore placed a De Julio screw on the left at S1 using navigation to allow for placement of the screw without interfering with the embedded broken screw.  We placed a 6.5 x 40 mm screw on the left at S1.  We then placed bilateral pelvic fixation using S2 alar iliac trajectories with the Medtronic ballast system of 9.5 x 90 mm bilaterally.  We then placed Bedrock implants for open sacroiliac joint fusion using neuronavigation bilaterally.  The patient's vertically oriented pelvis made placement across the SI joint  with significant iliac purchase difficult.  We redirected the left implant with a 7.0 x 70 mm implant and on the right we felt that a 7.0 x 70 mm implant was adequate also.  We were unable to use longer implants due to the patient's vertically oriented pelvis.  We checked the implants with a neuronavigation CT spin and were satisfied with the trajectories.      We then turned our attention to performing an L1-L2 Smith-Flowers osteotomy bilaterally as well as transforaminal interbody fusion.  The interspinous ligament was resected followed by resection of the ligamentum flavum bilaterally.  The inferior articular facets were resected with an osteotome and saved for local harvest autograft.  The superior articular facets were resected flush with the L2 pedicles and saved for local harvest autograft.  There was significant facet hypertrophy noted.  We entered the disk space first from the left using a 10 blade scalpel and a sharp tipped osteotome and then from the right.  Progressive insert and rotate distractors were used to increase the head of the disk space and pituitary rongeurs and curets were used to resect the disk material and prepare the endplates for fusion.  Interbody implants and the Arguse control system were sized and 2 implants were selected, 1 from the right and 1 from the left with 11 mm high x 22 mm long x 0 degree lordosis and filled with local harvest autograft.  Approximately 3 mm of additional autograft was placed in between the implants in order to facilitate anterior interbody arthrodesis.  The implants were placed bilaterally and confirmed in with fluoroscopic visualization.      We then turned our attention to performing the L4 pedicle subtraction osteotomy.      There was profoundly dense adherent scar as well as seroma cavity lining over the epidural space and this made significant difficulty in finding the appropriate epidural plane.  We were able to use sharp tipped curets to  establish a plane laterally and cephalad and caudally and then progressively elevate the scar plane from the epidural space in order to expose the dura and the lateral recesses.  We resected the remaining posterior elements, ensuring that the canal was completely opened from the L3 pedicle level down to the L5 pedicle.  We then resected the remaining transverse processes from the pedicles at L4 and identified the medial, lateral, superior and inferior border of the pedicles at L4.  We were careful to perform careful epidural adhesiolysis to mobilize the dura and the exiting and traversing nerve roots away from the pedicles to allow for resection.  First curets and then Leksell rongeurs were used to progressively resect the bilateral L3 pedicles, utilizing a dual surgeon for the entire surgery in order to help reduce the blood loss.  Of note, the seroma cavity lining as well as the previously attempted fusion at this level maintained nearly continuous oozing and bleeding despite use of thrombin-soaked Gelfoam wedges, as well as FloSeal, as well as bone wax, as well as direct packing with sponges.  We worked as rapidly as possible in order to minimize blood loss.  The pedicles after being resected flush with the vertebral bodies then had wedge cuts placed with a graded osteotome for approximately a 20 degree cut.  The pedicle cuts were resected as well as a wedge cut in the vertebral body and saved for local harvest autograft.  The ventral surface of the thecal sac was then freed away from the remaining vertebral body at L4 and an impactor was used to resect the posterior wall of vertebral body at L4.  After we resected the posterior wall, we were able to freely mobilize the L4 level and begin progressive controlled closure of the osteotomy.  We placed a temporizing preeti as we had some small amount of translation to the right and we were able to immediately correct this.  We then performed progressive closure of the  osteotomy until we had achieved approximately 20 degree correction across that level.  We then placed provisional rods and took x-rays to evaluate our correction.  We were careful not to overcorrect the patient, given her age and sagittal alignment.      We then placed a long preeti on the left side from L1 to the pelvis and on the right side from L1 to the pelvis and were happy with the alignment we achieved.  We placed additional satellite rods using the dual headed screws from L3-L5 bilaterally to assist with stability across the 3-column osteotomy.  Set plugs were torqued off to 's recommended torque.  We performed a posterior fusion using local harvest autograft as well as posterolateral fusion across the segment using local harvest autograft.  We had previously irrigated with multiple liters of antibiotic-impregnated saline.  We placed vancomycin powder in the incision.  The incision was closed with multiple layers of interrupted and running suture, followed by sterile dressing.  A Hemovac drain was placed above the fascia and tunneled to exit through a separate skin incision.      Final blood loss was 1525 mL with return on Cell Saver of 725 mL as well as additional 1 unit packed red cells and 1 unit of cryoprecipitate.      The patient had an episode during surgery that necessitated approximately an hour of operative pause while anesthesia was assessing some desaturation.  We had been prepared to close the surgery immediately if it had been unsafe to continue, but our Anesthesia colleagues were able to establish safe ventilation and verified with bronchoscopy that the tube was in the appropriate position.  The patient responded with some additional fluid and reduced her auto PEEP with that mechanism.  We were therefore able to continue surgery safely per our Anesthesia colleagues.      The procedure warrants a 22 modifier for the posterior spinal fusion, pedicle subtraction osteotomy and reinsertion  of spinal instrumentation due to the necessity of osteotomizing bone growth of the rods for removal of the instrumentation as well as significant epidural adhesiolysis needing to be performed in order to free the dura, adequately mobilize it to ensure no kinking for the pedicle subtraction osteotomy.      I performed the entire surgery alongside Dr. Samuels.  I was present from skin incision through the beginning stages of closure and I was immediately available for the remaining of closure.  Dr. Samuels was present for all of closure.      A dual surgeon approach was justified to minimize operative time and blood loss in this patient with large magnitude of deformity and significant previous instrumentation from prior surgeries.         EMILY OREILLY MD             D: 2020   T: 2020   MT: ALIYA      Name:     KACI MANUEL   MRN:      [id number]        Account:        [id number]   :      1945           Procedure Date: 2020      Document: [id number]

## 2020-01-08 NOTE — PROGRESS NOTES
Orthopaedic Surgery Progress Note 01/08/2020    E: No acute events overnight.    S: POD1. Stabilization unit. Patient concerned about persistent weakness and pain with right hip flexion - this was present before surgery. Pain controlled at rest. Denies numbness or tingling. Denies cp/sob/n/v. Tolerating oral diet. -BM +flatus. Voiding via romo. Plan of care reviewed and questions answered.    O:  Temp: 98  F (36.7  C) Temp src: Axillary BP: 91/74 Pulse: 64 Heart Rate: 76 Resp: 14 SpO2: 98 % O2 Device: Nasal cannula Oxygen Delivery: 3 LPM    Exam:  Gen: No acute distress, resting comfortably in bed, alert and oriented, cooperative with exam  Cardio: RRR, extremities wwp  Resp: Non-labored breathing  MSK:  Back: Dressing with mild serosanguinous strike through     Lower extremities:  Motor Strength Right Left   Hip flexion: L1, L2, L3 3/5 4/5   Hip adduction: L2, L3 4/5 4/5   Knee flexion: S1 4/5 5/5   Knee extension: L3, L4 4/5 5/5   Ankle dosiflexion: L4, L5 5/5 5/5   EHL: L5 5/5 5/5   Ankle plantarflexion: S1 5/5 5/5      Sensation from L2-S2 is preserved    Drain output: 10 ml serosanguinous since surgery    Recent Labs   Lab 01/07/20 2020 01/07/20  1415 01/07/20  1155   WBC  --  17.4* 10.8   HGB 11.1* 10.3*  10.3* 9.9*   PLT  --  216 244       Assessment and Plan: Carmelita Mina is a 74 year old female with PMH including sinusitis, HTN, HLD, hx of systemic lupus erythematosus, GERD, asthma, flatback syndrome now s/p L1-pelvis revision TLIF, PSO L4, SPO L1- 2, SI joint fusion with bedrock on 1/7/2020 with Dr. Samuels and Dr. Moyer.    Goals Today:  - Pain control  - Transfer to floor  - PT/OT  - Monitor drains    Abril Primary  Activity: Up with assist until independent. No excessive bending or twisting. No lifting >10 lbs x 6 weeks. No Vianca lift for transfers.   Weight bearing status: WBAT.  Pain management:   IV lidocaine: discontinue at 8am on POD#2 or earlier if not tolerated  Transition from IV to PO  as tolerated. No NSAIDs.   Antibiotics: Clindamycin x 24 hours.  Diet: Begin with clear fluids and progress diet as tolerated.   DVT prophylaxis: SCDs only. No chemical DVT ppx needed.  Imaging: XR Upright scoliosis films PTDC - ordered.  Labs: CBC, BMP POD#1.  Bracing/Splinting: None.  Dressings: Keep Aquacel c/d/i x 7 days.  Drains: Hemovac. Document output per shift, will be discontinued at Orthopedic Surgery discretion.  Ibarra catheter: Remove POD#1.   Physical Therapy/Occupational Therapy: Eval and treat.  Cultures: None.    Consults: Hospitalist.  Follow-up: Clinic with Dr. Samuels in 6 weeks with repeat x-rays.   Disposition: Pending progress with therapies, pain control on orals, and medical stability, anticipate discharge to home/TCU on POD #3-5.       Future Appointments   Date Time Provider Department Center   1/8/2020  8:45 AM Chino Lieberman PT [facility] [place]   1/8/2020  1:00 PM Chino Lieberman PT [facility] [place]   1/9/2020  7:30 AM Quynh Neves OT [facility] [place]   2/20/2020 11:00 AM Segundo Samuels MD [facility]   3/19/2020 11:00 AM Segundo Samuels MD [facility]       Patient discussed with Dr. Abril Barakat MD, PhD  Orthopedic Surgery PGY4  Pager [phone]    Please page me directly with any questions/concerns during regular weekday hours before 5pm. If there is no response, if it is a weekend, or if it is during evening hours then please page the orthopaedic surgery resident on call.

## 2020-01-08 NOTE — PLAN OF CARE
Major Shift Events: No acute events overnight or this morning. Patient alert and oriented, minimal pain on current regimen. GATES extremities, up with PT and walker. NSR 70-80s, BPs stable, MAPs > 65, afebrile. Room air, denies SOB. Regular diet, tolerated small breakfast this morning. 4mg zofran given x1 for nausea with PT this morning. Ibarra with adequate urine output, home lasix restarted today. Mid-back incision dressing CDI. Hemovac with bloody drainage. x3 PIVs, Lidocaine gtt, dilaudid PCA.     Plan: Transferred to 10A this morning.     For vital signs and complete assessments, please see documentation flowsheets.     Glenn Malik RN

## 2020-01-08 NOTE — PLAN OF CARE
Discharge Planner PT   Patient plan for discharge: TCU  Current status: WBAT - Bed mobility Min A with log rolling to the left, and sidely to sit. Transferred SBA to FWW from EOB. Gait x 4 steps in PACU. Standing exercise performed with minimal pain. Pt was able to march in place with FWW for support. Pt had nausea and vomited during WB portion of session.   Barriers to return to prior living situation: pain, pt is below baseline for function  Recommendations for discharge: TCU  Rationale for recommendations: Skilled PT needed for progression of WB tolerance and LE strength       Entered by: Chino Lieberman 01/08/2020 2:18 PM

## 2020-01-08 NOTE — PHARMACY-ADMISSION MEDICATION HISTORY
Admission medication history interview status for the 1/7/2020 admission is complete. See Epic admission navigator for allergy information, pharmacy, prior to admission medications and immunization status.     Medication history interview sources:  Patient, Roula    Changes made to PTA medication list (reason)  Added: Tylenol PM  Deleted: Advil PM  Changed:    Gabapentin 200 mg BID > 400 mg BID   Ibuprofen 1-2 capsules prn > 2 tablets BID prn    Additional medication history information (including reliability of information, actions taken by pharmacist):   1. Patient was a reliable historian. Knew medication names, frequencies, and doses.   2. Patient confirmed allergies and reactions. They have no additional allergies to report.    3. Patient has received the influenza vaccine for the current flu season.   4. Patient can take gabapentin 400 mg QID but decreased to 400 mg BID 2/2 GI upset.      Prior to Admission medications    Medication Sig Last Dose Taking? Auth Provider   acetaminophen (TYLENOL) 325 MG tablet Take 650 mg by mouth every morning  1/7/2020 at 0500 Yes Reported, Patient   atorvastatin (LIPITOR) 40 MG tablet Take 40 mg by mouth At Bedtime  1/6/2020 at 2200 Yes Reported, Patient   diphenhydrAMINE-acetaminophen (TYLENOL PM)  MG tablet Take 2 tablets by mouth At Bedtime 1/7/2020 at Unknown time Yes Unknown, Entered By History   furosemide (LASIX) 20 MG tablet Take 1 tablet by mouth every morning  1/6/2020 at 0800 Yes Reported, Patient   gabapentin (NEURONTIN) 400 MG capsule Take 400 mg by mouth 2 times daily 1/7/2020 at Unknown time Yes Unknown, Entered By History   ibuprofen (ADVIL/MOTRIN) 200 MG tablet Take 400 mg by mouth 2 times daily as needed for mild pain Takes 2 tablets daily in the morning and will take prn around noon if necessary Past Week at Unknown time Yes Unknown, Entered By History   Multiple Vitamins-Minerals (MULTIVITAL) TABS Take 1 tablet by mouth every morning Past Week at  Unknown time Yes Reported, Patient   pantoprazole (PROTONIX) 40 MG EC tablet Take 40 mg by mouth 2 times daily  1/7/2020 at 0500 Yes Reported, Patient   Probiotic Product (PROBIOTIC-10 PO) Take 1 capsule by mouth every morning Past Week at Unknown time Yes Reported, Patient          valACYclovir (VALTREX) 500 MG tablet Take 1 tablet by mouth as needed (PT last dose approx a month ago 12/13/19)  More than a month at Unknown time  Reported, Patient         Medication history completed by: Obdulia Fam, PharmD

## 2020-01-08 NOTE — PROGRESS NOTES
"CLINICAL NUTRITION SERVICES - ASSESSMENT NOTE     Nutrition Prescription    RECOMMENDATIONS FOR MDs/PROVIDERS TO ORDER:  None today    Malnutrition Status:    Patient does not meet two of the established criteria necessary for diagnosing malnutrition    Recommendations already ordered by Registered Dietitian (RD):  None today    Future/Additional Recommendations:  If protein intakes decline, consider sending Gelatein       REASON FOR ASSESSMENT  Carmelita Mina is a/an 74 year old female assessed by the dietitian for Provider Order - Dietitian to Assess and Order per Nutrition Protocol. Provider is responsible for nutrition oversight.    NUTRITION HISTORY  - Pt reported being very sleepy and provided minimal nutrition hx.   - Reports she does not drink milk but enjoys yogurt. She also likes eating meats and eggs.     CURRENT NUTRITION ORDERS  Diet: Regular  Intake/Tolerance: pt reports eating some toast this morning and some potato soup for lunch     LABS  Labs reviewed    MEDICATIONS  Medications reviewed    ANTHROPOMETRICS  Ht Readings from Last 1 Encounters:   01/07/20 1.575 m (5' 2.01\")   Most Recent Weight: 99.5 kg (219 lb 5.7 oz)  IBW: 50 kg (199% IBW)  BMI: 40.11 kg/m2 - Obesity Grade III BMI >40  Weight History: wt appears fairly stable over the last 3 months  Wt Readings from Last 10 Encounters:   01/07/20 99.5 kg (219 lb 5.7 oz)   12/13/19 101.6 kg (224 lb)   10/10/19 100.7 kg (222 lb)     Dosing Weight: 63 kg - adjusted from admit wt    ASSESSED NUTRITION NEEDS  Estimated Energy Needs: 7794-7050 kcals/day (25 - 30 kcals/kg)  Justification: Maintenance and Obese  Estimated Protein Needs: 75-95 grams protein/day (1.2 - 1.5 grams of pro/kg)  Justification: Obesity guidelines and Post-op  Estimated Fluid Needs: 1 mL/kcal  Justification: Per provider pending fluid status    PHYSICAL FINDINGS  See malnutrition section below.     MALNUTRITION  % Intake: Decreased intake does not meet criteria  % Weight Loss: " Weight loss does not meet criteria  Subcutaneous Fat Loss: None observed  Muscle Loss: None observed  Fluid Accumulation/Edema: None noted  Malnutrition Diagnosis: Patient does not meet two of the established criteria necessary for diagnosing malnutrition    NUTRITION DIAGNOSIS  Predicted inadequate protein-energy intake related to variable appetite as evidenced by pt reliant on PO intakes to meet 100% of nutritional needs with potential for variation       INTERVENTIONS  Implementation  Discussed increased protein needs post op and provided Protein Content of Room Service handout and explained contents. Pt was able to list several high protein foods they enjoyed and feel they will be able to eat enough protein. Encouraged to eat at least 1-2 high protein foods with each meal and occasional snacks.     Goals  Patient to consume % of nutritionally adequate meal trays TID, or the equivalent with supplements/snacks.     Monitoring/Evaluation  Progress toward goals will be monitored and evaluated per protocol.

## 2020-01-08 NOTE — PROGRESS NOTES
"ICU Brief Note    Assessment:  Carmelita Mina is a  74 year old female who underwent a L1-pelvis. Her course has been uneventful with NSR, no significant bleeding, and hemodynamically compensated.    /62   Pulse 64   Temp 98  F (36.7  C) (Axillary)   Resp 16   Ht 1.575 m (5' 2.01\")   Wt 99.5 kg (219 lb 5.7 oz)   SpO2 91%   BMI 40.11 kg/m        Plan:    - Continue cares per Ortho   - transfer to floor with lidocaine infusion   - Resume home Lasix   - Hospitalist consult for medical management     Abdifatah Clancy, NP-C     "

## 2020-01-08 NOTE — ANESTHESIA POSTPROCEDURE EVALUATION
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       Anesthesia Type:  Not documented  General    Reportable Event: NO     PAIN: Uncomplicated   Sign Out status: Comfortable, Well controlled pain     PONV: No PONV   Sign Out status:  No Nausea or Vomiting     Neuro/Psych: Uneventful perioperative course   Sign Out Status: Preoperative baseline; Age appropriate mentation     Airway/Resp.: Uneventful perioperative course   Sign Out Status: Non labored breathing, age appropriate RR; Resp. Status within EXPECTED Parameters     CV: Uneventful perioperative course   Sign Out status: Appropriate BP and perfusion indices; Appropriate HR/Rhythm     Disposition:   Sign Out in:  PACU  Disposition:  Phase II; Home  Recovery Course: Uneventful  Follow-Up: Not required           Last Anesthesia Record Vitals:  CRNA VITALS  2020 1543 - 2020 1643      2020             NIBP:  118/85    Ht Rate:  74    Temp:  36.4  C (97.5  F)    SpO2:  99 %    EKG:  Sinus rhythm          Last PACU Vitals:  Vitals Value Taken Time   /82 2020  7:15 PM   Temp 36.8  C (98.2  F) 2020  5:00 PM   Pulse 64 2020  5:00 PM   Resp 9 2020  7:18 PM   SpO2 98 % 2020  7:18 PM   Temp src     NIBP 118/85 2020  4:30 PM   Pulse     SpO2 99 % 2020  4:30 PM   Resp     Temp 36.4  C (97.5  F) 2020  4:30 PM   Ht Rate 74 2020  4:30 PM   Temp 2     Vitals shown include unvalidated device data.      Electronically Signed By: Aniket Le MD, 2020, 7:18 PM

## 2020-01-08 NOTE — OP NOTE
Procedure Date: 01/07/2020      PREOPERATIVE DIAGNOSES:   1.  Flat back syndrome.   2.  Pseudarthrosis.   3.  Broken instrumentation.      POSTOPERATIVE DIAGNOSES:   1.  Flat back syndrome.     2.  Pseudarthrosis.     3.  Broken instrumentation.      PROCEDURES:     1.  Posterior spinal fusion L-2.    2.  Pedicle subtraction osteotomy L4.     3.  Lorenzo-Flowers osteotomy L1-2.     4.  Transforaminal lumbar interbody fusion L1-L2.     5.  Insertion structural intervertebral device L1-2.      6.  Spinal instrumentation L1.     7.  Reinsertion spinal instrumentation L2-S1.     8.  Pelvic fixation Image-guided surgery.   9. Bilateral sacroiliac joint fusion     SURGEON:  Segundo Samuels Jr., MD      CO-SURGEON:  Wen Moyer MD, who was needed because of the complexity and severity of the operation.      INDICATION FOR OPERATION:  The patient is a 74-year-old adult female with a history of a previous spinal fusion for back and radicular symptoms.  She then developed a broken preeti and this was revised and she has continuing back and leg pain.  The pain is right L4 radicular type pain.  She has had treatment with acetaminophen and ibuprofen, and injections, physical therapy, and is currently on gabapentin.  Her Oswestry Disability Index score is 44%.  Imaging shows that she has a positive sagittal imbalance with a pelvic incidence lumbar lordosis mismatch.  The pelvic incidence of 67 and a lumbar lordosis of only 36.  Risks, benefits, alternative treatments and expected outcomes were extensively discussed.  It was her desire to proceed with surgical treatment.      DESCRIPTION OF TECHNICAL PROCEDURES:  The OR team was briefed about the plan.  The patient was brought to the operating room and underwent the induction of adequate general anesthesia, had a Ibarra catheter placed and was turned in position prone on a ProAxis table.  She was then prepared and draped in the usual sterile fashion.  A timeout was done confirming  the site and type of surgery.      After she was prepped and draped in the usual sterile fashion.  The spine was exposed, the previous incision was opened up, significantly adherent scar tissue was encountered but we were able to expose the spine and identify the spinal instrumentation.  The instrumentation had some bony overgrowth in several spots and this required an osteotome to remove it in order to access the screws and removed the implants.  A broken preeti was noted on the right side.  The implants were removed and replaced with Medtronic Solera screws.  These were from the 5.5 system.  These were titanium screws with cobalt chrome heads.  At L2, 6.5 x 55 mm screws were placed bilaterally; at L3, a 6.5 x 50 dual-preeti multi-axial screw was placed.  The L4 screws were left out as this was the anticipated site for a pedicle subtraction osteotomy; L5, 7.5 x 40 mm DRMAS screws, left S1, 6.5 x 40 and on the right 9.5 x 40 mm screw.  Spinous process tracking arc was attached to the spinous process of L1; 3D images were obtained with the O-arm and transferred to the Stealth image-guided work station and allowed us to place pedicle screws at L1 in a navigated fashion.  These were 5.5 x 50 mm screws bilaterally.  Next, a spin of the pelvis was done to allow us to place S2 alar iliac screws.  These were 9.5 x 90 and intentionally placed just above the sciatic notch.  They were done by utilizing navigated awl to create a start point, then a navigated drill guide, then navigated taps and navigated screw placement.  Next, we performed a bed rock technique fixation across the SI joint.  This was done by utilizing navigated awl, followed by navigated drill, followed by navigated drill pin, and drilling, a navigated broach and navigated implant placement.  Of note is that we had some technical challenges in executing this and initially I placed a 7.0 x 90 mm iFuse 3D implant on the right; however, it appeared that this had  diverged medially and on 2D and 3D imaging, it was, and so this was then shortened up to a 7.0 x 70 mm implant.  On the left side, we had significant challenges as the initial broaching broached medially as it appeared to diverge across the SI joint.  We were able to redirect this and again placed a 7.0 x 70 mm implant.  On 2D imaging it was difficult to tell if the implants were optimally placed but on 3D imaging, it did appear that they were contained within the ilium bilaterally.     A Lorenzo-Flowers osteotomy was performed at L1-L2 resecting the inferior facets of L1 and then superior articular facets of L2.  The disk space identified, incised, disk progressively resected, insert and rotate distractors used.  This was elevated up to accept two 11 mm high, 22 mm long, and 0 degree lordotic [employer] Capstone control interbody implants.  These were filled with local bone graft and approximately 3 mL of bone graft was placed in between the 2 of them.      Next, attention was addressed to the L4 level.  Here there was profoundly dense adherent scar from the previous laminectomy.  It took an extra amount of time to dissect and free up.  This took probably 2 to 3 times than normal amount in order to be able to expose this.  We were able to then progressively resect the residual dorsal bone and the lateral fusion mass resect the pedicles down to the posterior vertebral body decancellate the portion of the mid portion of the vertebral body, dissect the lateral wall and free it up bilaterally and then utilizing an osteotome, we were able to perform approximately a 20 to 25 degree wedge resection for the pedicle subtraction osteotomy, which then closed with minimal force.  A temporary preeti was placed initially on the right side and then an intraoperative image was obtained to get a sense of the overall alignment and it appeared that we had achieved our target.  My goal was perhaps the mid 50s degree lordosis range so  that she was slightly under corrected given her age.  Bilateral rods were placed along with the short rods.  Intraoperative stitched AP and lateral long films were obtained, which showed excellent alignment.  The wound been irrigated out copiously several times.  Set plugs were torqued off in accordance with the  specification.  The resected bone was used to perform a posterior fusion as well as a lateral fusion across the segment.      Once we had achieved the optimal correction and the set plugs were torqued off, the wound was then closed in layers.  Interrupted #1 absorbables were used to reapproximate the thoracolumbar fascia, #1 runner was used to oversew this.  Two layers of horizontal mattress sutures were applied to the deep subcutaneous tissue to reapproximate it,  followed by a subcutaneous runner, followed by an intradermal suture, followed by benzoin, Steri-Strips and an Aquacel dressing.      Estimated blood loss for this case was 1525 mL in a patient with an estimated blood volume of 7000 mL.  She received 725 mL back via Cell Saver return, 1 unit packed red blood cells and 1 unit of cryoprecipitate.      Of note, is that during the surgery we did have an episode where there were some challenges of ventilating her.  It was unclear if the endotracheal tube was down the right main stem bronchus or not.  We temporarily halted surgery and Dr. Mckinney from anesthesia performed a bronchoscopy through the endotracheal tube and found to be in good position.  The presumption was either that she was perhaps a little fluid depleted or that there had been slight kinking of the tube but with ventilation secured, we were able to continue and complete the operation.      Of note, is this procedure merits a 22 modifier for the posterior spinal fusion, the reinsertion of spinal instrumentation, and the pedicle subtraction osteotomy.      Also of note is the assistant on this case was JILL Ding, who was  needed for assistance with exposure, retraction, instrumentation, bone graft preparation and closure.  No qualified resident was available.         HUA MARINELLI JR, MD             D: 2020   T: 2020   MT: ALICE      Name:     KACI MANUEL   MRN:      [id number]        Account:        [id number]   :      1945           Procedure Date: 2020      Document: [id number]       cc: Copy for Patient        Wen Rios MD

## 2020-01-08 NOTE — PLAN OF CARE
Pt arrived from the stabilization unit at 1200.     VS:   VSS. Lung sounds clear. Telemetry monitoring in place- normal sinus rhythm.    Output:   Ibarra in place- adequate output. Continue to monitor output closely, strict I/Os.   Bowel sounds active, patient denies passing flatus.    Activity:   Stood at bedside with PT with assist of 2 and walker. Needs assist of 1 to reposition in bed.    Skin:Dressing(s): Surgical incision- dressing C/D/I  Small skin tear on right cheek- open to air    Pain:   Managed with PCA Dilaudid and continuous lidocaine infusion.    Neuro/CMS:   Alert and oriented x4. Reports slight numbness in her right leg (MD aware), pt reported this is improving.   No symptoms of lidocaine toxicity present.    Diet:   Tolerating regular diet well. Denies nausea.    IV's/Drains:   PIV infusing.   Hemovac- 10mL output    Plan:   Pt plans to discharge to TCU when able. Continue to encourage movement. IV lidocaine to stay in place until tomorrow (POD#2). Pt able to make all needs known, call light in reach.

## 2020-01-09 ENCOUNTER — APPOINTMENT (OUTPATIENT)
Dept: PHYSICAL THERAPY | Facility: CLINIC | Age: 75
DRG: 454 | End: 2020-01-09
Attending: ORTHOPAEDIC SURGERY
Payer: MEDICARE

## 2020-01-09 ENCOUNTER — APPOINTMENT (OUTPATIENT)
Dept: OCCUPATIONAL THERAPY | Facility: CLINIC | Age: 75
DRG: 454 | End: 2020-01-09
Attending: ORTHOPAEDIC SURGERY
Payer: MEDICARE

## 2020-01-09 ENCOUNTER — APPOINTMENT (OUTPATIENT)
Dept: CARDIOLOGY | Facility: CLINIC | Age: 75
DRG: 454 | End: 2020-01-09
Attending: PHYSICIAN ASSISTANT
Payer: MEDICARE

## 2020-01-09 LAB
ANION GAP SERPL CALCULATED.3IONS-SCNC: 3 MMOL/L (ref 3–14)
BUN SERPL-MCNC: 21 MG/DL (ref 7–30)
CALCIUM SERPL-MCNC: 8.4 MG/DL (ref 8.5–10.1)
CHLORIDE SERPL-SCNC: 107 MMOL/L (ref 94–109)
CO2 SERPL-SCNC: 27 MMOL/L (ref 20–32)
CREAT SERPL-MCNC: 1.4 MG/DL (ref 0.52–1.04)
ERYTHROCYTE [DISTWIDTH] IN BLOOD BY AUTOMATED COUNT: 13.9 % (ref 10–15)
GFR SERPL CREATININE-BSD FRML MDRD: 37 ML/MIN/{1.73_M2}
GLUCOSE SERPL-MCNC: 114 MG/DL (ref 70–99)
HCT VFR BLD AUTO: 26.6 % (ref 35–47)
HGB BLD-MCNC: 8.6 G/DL (ref 11.7–15.7)
MCH RBC QN AUTO: 30.8 PG (ref 26.5–33)
MCHC RBC AUTO-ENTMCNC: 32.3 G/DL (ref 31.5–36.5)
MCV RBC AUTO: 95 FL (ref 78–100)
PLATELET # BLD AUTO: 104 10E9/L (ref 150–450)
POTASSIUM SERPL-SCNC: 4.4 MMOL/L (ref 3.4–5.3)
RBC # BLD AUTO: 2.79 10E12/L (ref 3.8–5.2)
SODIUM SERPL-SCNC: 137 MMOL/L (ref 133–144)
WBC # BLD AUTO: 8.5 10E9/L (ref 4–11)

## 2020-01-09 PROCEDURE — 25800030 ZZH RX IP 258 OP 636: Performed by: ORTHOPAEDIC SURGERY

## 2020-01-09 PROCEDURE — 93306 TTE W/DOPPLER COMPLETE: CPT | Mod: 26 | Performed by: INTERNAL MEDICINE

## 2020-01-09 PROCEDURE — 25000128 H RX IP 250 OP 636: Performed by: PHYSICIAN ASSISTANT

## 2020-01-09 PROCEDURE — 80048 BASIC METABOLIC PNL TOTAL CA: CPT | Performed by: PHYSICIAN ASSISTANT

## 2020-01-09 PROCEDURE — 97530 THERAPEUTIC ACTIVITIES: CPT | Mod: GP | Performed by: PHYSICAL THERAPIST

## 2020-01-09 PROCEDURE — 85027 COMPLETE CBC AUTOMATED: CPT | Performed by: PHYSICIAN ASSISTANT

## 2020-01-09 PROCEDURE — 12000001 ZZH R&B MED SURG/OB UMMC

## 2020-01-09 PROCEDURE — 97530 THERAPEUTIC ACTIVITIES: CPT | Mod: GO

## 2020-01-09 PROCEDURE — 99232 SBSQ HOSP IP/OBS MODERATE 35: CPT | Performed by: INTERNAL MEDICINE

## 2020-01-09 PROCEDURE — 97165 OT EVAL LOW COMPLEX 30 MIN: CPT | Mod: GO

## 2020-01-09 PROCEDURE — 93306 TTE W/DOPPLER COMPLETE: CPT

## 2020-01-09 PROCEDURE — 25000132 ZZH RX MED GY IP 250 OP 250 PS 637: Mod: GY | Performed by: PHYSICIAN ASSISTANT

## 2020-01-09 PROCEDURE — 36415 COLL VENOUS BLD VENIPUNCTURE: CPT | Performed by: PHYSICIAN ASSISTANT

## 2020-01-09 PROCEDURE — 97116 GAIT TRAINING THERAPY: CPT | Mod: GP | Performed by: PHYSICAL THERAPIST

## 2020-01-09 PROCEDURE — 25000128 H RX IP 250 OP 636: Performed by: ORTHOPAEDIC SURGERY

## 2020-01-09 PROCEDURE — 97535 SELF CARE MNGMENT TRAINING: CPT | Mod: GO

## 2020-01-09 RX ORDER — HYDROMORPHONE HYDROCHLORIDE 2 MG/1
2 TABLET ORAL EVERY 4 HOURS PRN
Qty: 60 TABLET | Refills: 0 | Status: SHIPPED | OUTPATIENT
Start: 2020-01-09 | End: 2020-01-10

## 2020-01-09 RX ORDER — AMOXICILLIN 250 MG
2 CAPSULE ORAL 2 TIMES DAILY
Qty: 28 TABLET | Refills: 0 | Status: SHIPPED | OUTPATIENT
Start: 2020-01-10 | End: 2021-04-26

## 2020-01-09 RX ADMIN — LIDOCAINE HYDROCHLORIDE 1 MG/KG/HR: 20 INJECTION, SOLUTION INTRAVENOUS at 06:11

## 2020-01-09 RX ADMIN — ACETAMINOPHEN 975 MG: 325 TABLET, FILM COATED ORAL at 19:34

## 2020-01-09 RX ADMIN — HYDROMORPHONE HYDROCHLORIDE 2 MG: 2 TABLET ORAL at 15:43

## 2020-01-09 RX ADMIN — HYDROMORPHONE HYDROCHLORIDE 2 MG: 2 TABLET ORAL at 03:34

## 2020-01-09 RX ADMIN — POLYETHYLENE GLYCOL 3350 17 G: 17 POWDER, FOR SOLUTION ORAL at 08:28

## 2020-01-09 RX ADMIN — GABAPENTIN 100 MG: 100 CAPSULE ORAL at 19:34

## 2020-01-09 RX ADMIN — GABAPENTIN 100 MG: 100 CAPSULE ORAL at 08:26

## 2020-01-09 RX ADMIN — ONDANSETRON 4 MG: 4 TABLET, ORALLY DISINTEGRATING ORAL at 11:24

## 2020-01-09 RX ADMIN — ACETAMINOPHEN 975 MG: 325 TABLET, FILM COATED ORAL at 11:24

## 2020-01-09 RX ADMIN — GABAPENTIN 100 MG: 100 CAPSULE ORAL at 14:12

## 2020-01-09 RX ADMIN — SENNOSIDES AND DOCUSATE SODIUM 2 TABLET: 8.6; 5 TABLET ORAL at 08:27

## 2020-01-09 RX ADMIN — DOCUSATE SODIUM 100 MG: 100 CAPSULE, LIQUID FILLED ORAL at 08:26

## 2020-01-09 RX ADMIN — PANTOPRAZOLE SODIUM 40 MG: 40 TABLET, DELAYED RELEASE ORAL at 19:34

## 2020-01-09 RX ADMIN — SENNOSIDES AND DOCUSATE SODIUM 2 TABLET: 8.6; 5 TABLET ORAL at 19:34

## 2020-01-09 RX ADMIN — CALCIUM CARBONATE (ANTACID) CHEW TAB 500 MG 1000 MG: 500 CHEW TAB at 00:33

## 2020-01-09 RX ADMIN — DOCUSATE SODIUM 100 MG: 100 CAPSULE, LIQUID FILLED ORAL at 19:34

## 2020-01-09 RX ADMIN — ACETAMINOPHEN 975 MG: 325 TABLET, FILM COATED ORAL at 03:34

## 2020-01-09 RX ADMIN — HYDROMORPHONE HYDROCHLORIDE 2 MG: 2 TABLET ORAL at 19:34

## 2020-01-09 RX ADMIN — HYDROMORPHONE HYDROCHLORIDE 2 MG: 2 TABLET ORAL at 06:50

## 2020-01-09 RX ADMIN — ATORVASTATIN CALCIUM 40 MG: 40 TABLET, FILM COATED ORAL at 19:34

## 2020-01-09 RX ADMIN — PANTOPRAZOLE SODIUM 40 MG: 40 TABLET, DELAYED RELEASE ORAL at 08:27

## 2020-01-09 RX ADMIN — HYDROMORPHONE HYDROCHLORIDE 2 MG: 2 TABLET ORAL at 00:33

## 2020-01-09 RX ADMIN — HYDROMORPHONE HYDROCHLORIDE 2 MG: 2 TABLET ORAL at 11:24

## 2020-01-09 ASSESSMENT — ACTIVITIES OF DAILY LIVING (ADL)
ADLS_ACUITY_SCORE: 14
ADLS_ACUITY_SCORE: 21
ADLS_ACUITY_SCORE: 17
ADLS_ACUITY_SCORE: 17
ADLS_ACUITY_SCORE: 14
ADLS_ACUITY_SCORE: 17

## 2020-01-09 ASSESSMENT — PAIN DESCRIPTION - DESCRIPTORS
DESCRIPTORS: ACHING
DESCRIPTORS: ACHING

## 2020-01-09 ASSESSMENT — MIFFLIN-ST. JEOR: SCORE: 1503.91

## 2020-01-09 NOTE — PLAN OF CARE
"  VS:    /67 (BP Location: Right arm)   Pulse 81   Temp 97.1  F (36.2  C) (Oral)   Resp 16   Ht 1.575 m (5' 2.01\")   Wt 99.5 kg (219 lb 5.7 oz)   SpO2 96%   BMI 40.11 kg/m  Stable no abnormalities noted. No reports of SOB, CP, or dizziness. LS clear equal bilaterally, on RA   Output:    Voids spontaneously w/out difficulty, incontinence noted overnight . Last BM 1/8/20, not passing gas   Activity:    Assist of 1 w/ walker & gait belt    Skin: Skin tear R cheek  Wound R Lower flank: scant drainage noted, intradry in place   Pain:    Moderate, manageable w/ dilaudid 2-4mg q4h     Neuro/CMS:    Intact, denies numbness/tingling    Dressing(s):    Posterior back: CDI, no drainage noted   Diet:    Regular   Equipment:    Walker  Tele: NSR   IV's/Drains:    PIV R hand: saline locked  Hemovac   Plan:    Discharge to home or TCU   Additional Info:    Hemovac: drainage not going into bag, but site - changed - continue to monitor   Discontinued IMC/Tele  Warm R foot vs L foot (cold) noted during assessment - no pain or sign/symptoms noted by pt            "

## 2020-01-09 NOTE — PLAN OF CARE
"  VS:   BP 98/52   Pulse 64   Temp 97.6  F (36.4  C) (Oral)   Resp 16   Ht 1.575 m (5' 2.01\")   Wt 99.5 kg (219 lb 5.7 oz)   SpO2 96%   BMI 40.11 kg/m    Room air   Output:   Ibarra pulled this shift @ 1930. Voided x1 with PVR of 20. Commode at bedside.     Bowel sounds good, not passing gas. Gave all bowel meds and encouraged movement.   Lungs Lung sounds clear   Activity:   Stood up to commode and walked in room today. Assist x1 with walker and gait belt.   Skin: WDL ex incision, small abrasion on R cheek, skin tear on R hip under skin fold   Pain:   Controled with PRN oral dil 2-4, lidocaine drip.   Neuro/CMS:   CMS intact, A&O x4, some numbness on R leg since surgery. MD aware, has been improving.   Dressing(s):   Aquacell CDI   Diet:   Regular diet, tolerating well. Some nausea and emesis x1. Pt refused zofran.   LDA:   PIV infusing TKO with the lidocaine drip. hemovac draining serosanguinous   Equipment:   Commode, call light, Lidocaine drip, IV pole, walker   Plan:   Continue to monitor pain, encourage therapies, work towards BM.   Additional Info:          "

## 2020-01-09 NOTE — PROGRESS NOTES
Orthopaedic Surgery Progress Note 01/09/2020    E: No acute events overnight.    S: POD2. Pain controlled at rest. Denies numbness or tingling. Denies cp/sob/n/v. Tolerating oral diet. -BM +flatus. Voiding via romo. Plan of care reviewed and questions answered.    O:  Temp: 97.6  F (36.4  C) Temp src: Oral BP: 98/52   Heart Rate: 82 Resp: 16 SpO2: 96 % O2 Device: None (Room air) Oxygen Delivery: 2 LPM    Exam:  Gen: No acute distress, resting comfortably in bed, alert and oriented, cooperative with exam  Cardio: RRR, extremities wwp  Resp: Non-labored breathing  MSK:  Back: Dressing CDI     Lower extremities:  Motor Strength Right Left   Hip flexion: L1, L2, L3 3/5 4/5   Hip adduction: L2, L3 4/5 4/5   Knee flexion: S1 4/5 5/5   Knee extension: L3, L4 4/5 5/5   Ankle dosiflexion: L4, L5 5/5 5/5   EHL: L5 5/5 5/5   Ankle plantarflexion: S1 5/5 5/5      Sensation from L2-S2 is preserved     Drain output: 10/70 ml sanguinous last 3 shifts      Recent Labs   Lab 01/08/20  1326 01/08/20  0722 01/07/20 2020 01/07/20  1415 01/07/20  1155   WBC  --  10.5  --  17.4* 10.8   HGB 8.9* 9.3* 11.1* 10.3*  10.3* 9.9*   PLT  --  120*  --  216 244       Assessment and Plan: Carmelita Mina is a 74 year old female with PMH including sinusitis, HTN, HLD, hx of systemic lupus erythematosus, GERD, asthma, flatback syndrome now s/p L1-pelvis revision TLIF, PSO L4, SPO L1- 2, SI joint fusion with bedrock on 1/7/2020 with Dr. Samuels and Dr. Moyer.     Goals Today:  - Pain control  - PT/OT  - Monitor drains - okay to pull if clotted and no additional output 1-2 shifts  - Transition off lidocaine gtt     Abril Primary  Activity: Up with assist until independent. No excessive bending or twisting. No lifting >10 lbs x 6 weeks. No Vianca lift for transfers.   Weight bearing status: WBAT.  Pain management:   IV lidocaine: discontinue at 8am on POD#2 or earlier if not tolerated  Transition from IV to PO as tolerated. No NSAIDs.    Antibiotics: Clindamycin x 24 hours - completed.  Diet: Begin with clear fluids and progress diet as tolerated.   DVT prophylaxis: SCDs only. No chemical DVT ppx needed.  Imaging: XR Upright scoliosis films PTDC - ordered.  Labs: CBC, BMP POD#1.  Bracing/Splinting: None.  Dressings: Keep Aquacel c/d/i x 7 days.  Drains: Hemovac. Document output per shift, will be discontinued at Orthopedic Surgery discretion.  Ibarra catheter: Remove POD#1.   Physical Therapy/Occupational Therapy: Eval and treat.  Cultures: None.    Consults: Hospitalist.  Follow-up: Clinic with Dr. Samuels in 6 weeks with repeat x-rays.   Disposition: Pending progress with therapies, pain control on orals, and medical stability, anticipate discharge to home/TCU on POD #3-5.    Future Appointments   Date Time Provider Department Center   1/9/2020  7:30 AM Quynh Neves OT [facility]   1/9/2020 10:45 AM Chino Lieberman, PT [facility]   1/9/2020 11:30 AM Chino Lieberman, PT [facility]   1/9/2020  2:30 PM Chino Lieberman, PT [facility]   2/20/2020 11:00 AM Segundo Samuels MD [facility]   3/19/2020 11:00 AM Segundo Samuels MD [facility]       Patient discussed with Dr. Abril Barakat MD, PhD  Orthopedic Surgery PGY4  Pager [phone]    Please page me directly with any questions/concerns during regular weekday hours before 5pm. If there is no response, if it is a weekend, or if it is during evening hours then please page the orthopaedic surgery resident on call.

## 2020-01-09 NOTE — PLAN OF CARE
"  VS:    BP 98/52   Pulse 64   Temp 97.6  F (36.4  C) (Oral)   Resp 16   Ht 1.575 m (5' 2.01\")   Wt 99.5 kg (219 lb 5.7 oz)   SpO2 96%   BMI 40.11 kg/m       Output:    Voided x2 and PVR of 36 mls. Pt had one bout of incontinence. Hemovac. Pt is passing gas. LBM 01/06/19.   Activity:     Assist of 1 with walker and gait belt. Up to commode x2.    Skin: Wound on R anterior hip skin fold. Swelling in L and R hand due to PIV infiltration. +1 Edema on R and L feet. Small skin tear on R cheek.    Pain:    Pain managed via prn dilaudid (2-4 mg).    Neuro/CMS:    Neuros intact. Pt has baseline numbness in R leg and stated made it difficult to maneuver.    Dressing(s):     Aquacell CDI.    Diet:    Regular diet. Pt had some heartburn treated with tums.    Equipment:    Walker, IV pole, lidocaine drip, dentures, hearing aids,    IV's/Drains:    PIV in R forarm. Hemovac not showing output this shift but has leaked serosanguinous drainage at insertion site.    Plan:    Monitor pain, encourage ambulation and manage bowel function.    Additional Info:     Need to reinforce proper movement and log roll maneuver. Pt needs repetitive encouragement to ambulate often and appropriately.             "

## 2020-01-09 NOTE — PLAN OF CARE
Discharge Planner PT   Patient plan for discharge: TCU  Current status: WBAT. Patient completes bed mobility with min to mod A x1. Gait completed from bed to commode in bathroom and return with CGA. Tolerates sitting position for 25 minutes at commode practicing postural control while performing self cares. Patient complained of nausea toward end of session with no emesis. Ended session in bed with cares in reach.   Barriers to return to prior living situation: Pain, weakness, tolerance to WB and activity  Recommendations for discharge: TCU  Rationale for recommendations: for progression of WB tolerance, activity tolerance, and strengthening of LE's.       Entered by: Courtney Pang 01/09/2020 11:33 AM

## 2020-01-09 NOTE — PROGRESS NOTES
01/09/20 0700   Quick Adds   Type of Visit Initial Occupational Therapy Evaluation   Living Environment   Lives With spouse   Living Arrangements house   Home Accessibility stairs to enter home   Number of Stairs, Main Entrance 2   Transportation Anticipated family or friend will provide   Living Environment Comment Lives with  who can assist. Has RTS. Walk in shower with grab bars and shower chair. All needs met on one level.    Self-Care   Usual Activity Tolerance moderate   Current Activity Tolerance fair   Equipment Currently Used at Home   (has walker, LH shoe horn, RTS, shower chair)   Functional Level   Ambulation 0-->independent   Transferring 0-->independent   Toileting 0-->independent   Bathing 1-->assistive equipment   Dressing 2-->assistive person   Eating 0-->independent   Communication 0-->understands/communicates without difficulty   Swallowing 0-->swallows foods/liquids without difficulty   Cognition 0 - no cognition issues reported   Fall history within last six months yes   Number of times patient has fallen within last six months 1   Prior Functional Level Comment PTA patient was IND with mobility,  assisted with donning socks. Uses shower chair in shower.    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 Samuels and Dr. Moyer.   Precautions/Limitations fall precautions;spinal precautions   Weight-Bearing Status - LUE partial weight-bearing (% in comments)  (no lifting >10 lbs)   Weight-Bearing Status - RUE partial weight-bearing (% in  comments)  (no lifting >10 lbs)   Weight-Bearing Status - LLE weight-bearing as tolerated   Weight-Bearing Status - RLE weight-bearing as tolerated   General Observations Hemovac, IV.    Cognitive Status Examination   Orientation orientation to person, place and time   Level of Consciousness alert   Follows Commands (Cognition) WNL   Cognitive Comment sleepy, but cognition appeared intact. will continue to monitor as needed.    Visual Perception   Visual Perception No deficits were identified   Sensory Examination   Sensory Quick Adds No deficits were identified   Pain Assessment   Patient Currently in Pain Yes, see Vital Sign flowsheet   Range of Motion (ROM)   ROM Comment B UE's WFL   Strength   Strength Comments not formally tested secondary to precautions, decreased strength consistent with post op   Muscle Tone Assessment   Muscle Tone Quick Adds No deficits were identified   Coordination   Upper Extremity Coordination No deficits were identified   Mobility   Bed Mobility Comments Supine>sit using log roll with overall min A   Transfer Skill: Bed to Chair/Chair to Bed   Level of [place]: Bed to Chair contact guard   Physical Assist/Nonphysical Assist: Bed to Chair set-up required;verbal cues   Weight-Bearing Restrictions weight-bearing as tolerated   Assistive Device - Transfer Skill Bed to Chair Chair to Bed Rehab Eval rolling walker   Transfer Skill: Sit to Stand   Level of [place]: Sit/Stand contact guard   Physical Assist/Nonphysical Assist: Sit/Stand set-up required;verbal cues   Transfer Skill: Sit to Stand weight-bearing as tolerated   Assistive Device for Transfer: Sit/Stand rolling walker   Transfer Skill: Toilet Transfer   Level of [place]: Toilet contact guard   Physical Assist/Nonphysical Assist: Toilet set-up required;verbal cues   Weight-Bearing Restrictions: Toilet weight-bearing as tolerated   Assistive Device rolling walker   Toilet Transfer Skill Comments BSC   Lower Body  "Dressing   Level of [place]: Dress Lower Body moderate assist (50% patients effort)   Physical Assist/Nonphysical Assist: Dress Lower Body set-up required;verbal cues   Assistive Device reacher;sock-aid   Toileting   Level of [place]: Toilet minimum assist (75% patients effort)   Physical Assist/Nonphysical Assist: Toilet set-up required   Grooming   Level of [place]: Grooming contact guard   Physical Assist/Nonphysical Assist: Grooming set-up required   Eating/Self Feeding   Level of [place]: Eating independent   Activities of Daily Living Analysis   Impairments Contributing to Impaired Activities of Daily Living balance impaired;pain;post surgical precautions;ROM decreased;strength decreased   General Therapy Interventions   Planned Therapy Interventions ADL retraining;transfer training   Clinical Impression   Criteria for Skilled Therapeutic Interventions Met yes, treatment indicated   OT Diagnosis Decreased functional mobility and ADLs   Influenced by the following impairments pain, post op precautions, decreased strength   Assessment of Occupational Performance 3-5 Performance Deficits   Identified Performance Deficits dressing, bathing, toileting, transfers, home mgmt   Clinical Decision Making (Complexity) Low complexity   Therapy Frequency Daily   Predicted Duration of Therapy Intervention (days/wks) 5 days   Anticipated Equipment Needs at Discharge   (has most DME in place at home, TBD)   Anticipated Discharge Disposition Transitional Care Facility   Risks and Benefits of Treatment have been explained. Yes   Patient, Family & other staff in agreement with plan of care Yes   [facility] TM \"6 Clicks\"   2016, Trustees of [facility], under license to [employer].  All rights reserved.   6 Clicks Short Forms Daily Activity Inpatient Short Form   [facility] AM-PAC  \"6 Clicks\" Daily Activity Inpatient Short Form   1. Putting on and taking off regular lower body " clothing? 2 - A Lot   2. Bathing (including washing, rinsing, drying)? 2 - A Lot   3. Toileting, which includes using toilet, bedpan or urinal? 3 - A Little   4. Putting on and taking off regular upper body clothing? 3 - A Little   5. Taking care of personal grooming such as brushing teeth? 4 - None   6. Eating meals? 4 - None   Daily Activity Raw Score (Score out of 24.Lower scores equate to lower levels of function) 18   Total Evaluation Time   Total Evaluation Time (Minutes) 8

## 2020-01-09 NOTE — PLAN OF CARE
Discharge Planner OT   Patient plan for discharge: TCU   Current status: Patient sleepy but agreeable to OT, c/o some nausea but no emesis during session. Supine>sit using log roll technique with overall min A. Transferred to BSC and chair using FWW with CGA. Sitting up in chair eating breakfast at end of session.   Barriers to return to prior living situation: pain, post op precautions, decreased strength post op   Recommendations for discharge: TCU   Rationale for recommendations: to maximize safety and IND with functional mobility and ADLs/IADLs       Entered by: RAMAN ORTEGA 01/09/2020 8:17 AM

## 2020-01-09 NOTE — PROGRESS NOTES
"[facility], [place]   Internal Medicine Daily Note           Interval History/Events     Overnight events reviewed  Had episode of emesis last night  No chest pain, shortness of breath  No lightheadedness or dizziness  No cough  No abdominal pain  No BM yet       Review of Systems        4 point ROS including Respiratory, CV, GI and , other than that noted above is negative      Medications   I have reviewed current medications  in the \"current medication\" section of Epic.  Relevant changes include:     Physical Exam   General:       Vital signs:    Blood pressure 138/67, pulse 81, temperature 97.1  F (36.2  C), temperature source Oral, resp. rate 16, height 1.575 m (5' 2.01\"), weight 99.5 kg (219 lb 5.7 oz), SpO2 96 %.  Estimated body mass index is 40.11 kg/m  as calculated from the following:    Height as of this encounter: 1.575 m (5' 2.01\").    Weight as of this encounter: 99.5 kg (219 lb 5.7 oz).      Intake/Output Summary (Last 24 hours) at 1/9/2020 0951  Last data filed at 1/9/2020 0610  Gross per 24 hour   Intake 142.6 ml   Output 1925 ml   Net -1782.4 ml        Constitutional: In on acute distress. Laying in bed  Eye: No icterus, no pallor  Mouth/ENT: Moist oral mucosa  Cardiovascular: S1, S2 normal. No pretibial edema  Respiratory: B/L CTA  GI: Soft, NT, BS+  :   Neurology:  Alert, awake, and oriented. No tremors  Psych: Normal mood  MSK:   Integumentary: Right groin has erythematous patch  Heme/Lymph/Imm:      Laboratory and Imaging Studies     I have reviewed  laboratory and imaging studies in the Epic. Pertinent findings are as below:    BMP  Recent Labs   Lab 01/09/20  0549 01/08/20  1326 01/08/20  0722 01/07/20  1415    138 140 141   POTASSIUM 4.4 4.6 5.6* 3.7   CHLORIDE 107 108 111*  --    SARWAT 8.4* 7.6* 7.6*  --    CO2 27 23 24  --    BUN 21 24 23  --    CR 1.40* 1.49* 1.49*  --    * 128* 172* 146*     CBC  Recent Labs   Lab 01/09/20  0549  01/08/20  0722 "  01/07/20  1415 01/07/20  1155   WBC 8.5  --  10.5  --  17.4* 10.8   RBC 2.79*  --  3.04*  --  3.32* 3.27*   HGB 8.6*   < > 9.3*   < > 10.3*  10.3* 9.9*   HCT 26.6*  --  30.2*  --  32.0* 31.5*   MCV 95  --  99  --  96 96   MCH 30.8  --  30.6  --  31.0 30.3   MCHC 32.3  --  30.8*  --  32.2 31.4*   RDW 13.9  --  14.0  --  13.3 13.3   *  --  120*  --  216 244    < > = values in this interval not displayed.     INR  Recent Labs   Lab 01/07/20  1415 01/07/20  1315 01/07/20  1155 01/07/20  1008   INR 1.24* 1.24* 1.15* 1.10     LFTsNo lab results found in last 7 days.   PANCNo lab results found in last 7 days.        Impression/Plan        74 year old female with history of SLE, HTN, GERD, HLD, asthma, and chronic pain who was admitted to ICU 1/7/2020 following redo L1 to pelvis posterior fusion per Dr. Samuels of orthopedics. Transferred to the medical floor 1/8/2020 for ongoing care.      # Pseudarthrosis, broken instrumentation, flat back syndrome s/p redo L1-pelvis posterior spinal fusion: 1/7 per Dr. Samuels. H/o L2-L5 spinal fusion 2016 c/b broken hardware now s/p revision 11/2017. EBL as below. Transferred out of the SICU to the floor 1/8/20.  Management primarily per Ortho team.  On Analgesics  - Wound cares, Dressings, Surgical pain management, DVT Prophylaxis  per primary team.   - Monitor anemia, hemodynamics, Input/Output  - Encourage Incentive spirometry  - Laxatives for constipation prophylaxis     # Anemia: Most likely due to Hemodilution, blood loss, and post surgical inflammation.   BL hgb appears to be normal. EBL 1425 ml with 725 returned via Cell Saver. S/p 1U RBC and 1 unit cryo. Hgb 93 (11.1)  Hg 8.6 gm/dl on 01/09  - Keep hgb >7, fibrinogen >200  - Hg in AM  - Transfuse if Hg < 7 gm/dl       # Nonoliguric SHAYLEE on CKD II: BL Cr not quite clear, likely around 0.9-1.0. Elevated to 1.49 1/8/20. BUN 23. Has been on IVF. Possible prerenal disease vs mild ATN.   UO 1025 since midnight without any IV  fluids suggest mild ATN. Urine Osmolality 417, and U Na 47.   - Strict I&Os, daily standing weights   - Hold lasix  - Renally dose meds avoid nephrotoxics  - Avoid hypotension, hypovolemia  - BMP in AM     # Hyperkalemia: K 5.6 (not hemolyzed) in the setting of IVF with KCl. Likely iatrogenic, SHAYLEE could be contributing as well. BL CKD II  K level 4.4 on 01/09  - Continue     # HTN: PTA on lasix which was resumed 1/8/2020. Hypotensive to 85/67 overnight with good response to IV bolus. /97 on 01/09 AM  - Hold lasix given SHAYLEE and ongoing slightly soft pressures       # Possible new murmur: II/VI murmur noted on exam. Asymptomatic. Denies prior knowledge of murmur. No prior echo noted  - Echo 1/9 pending     # L IV infiltration: New edema in left hand. Worse with IV flush. Very likely 2/2 IV infiltration. Unlikely DVT given worsening with IV flush. Contact medicine if worsening edema, pain, new dyspnea, new hypoxia, new hypoxia    # GERD: Continue PPI  # HLD: Continue statin  # Asthma: Resume PTA albuterol   # SLE: Stable, denies flare. No PTA meds      # Diet: Regular Diet Adult    # DVT Prophylaxis: Defer to primary service  # Ibarra Catheter: in place, indication: Strict 1-2 Hour I&O  # Code Status: Full Code          Pt's care was discussed with bedside RN, patient and  during Care Team Rounds.     Kale Ward MD  Hospitalist ( Internal medicine)  Pager: [phone]

## 2020-01-10 ENCOUNTER — APPOINTMENT (OUTPATIENT)
Dept: GENERAL RADIOLOGY | Facility: CLINIC | Age: 75
DRG: 454 | End: 2020-01-10
Attending: PHYSICIAN ASSISTANT
Payer: MEDICARE

## 2020-01-10 ENCOUNTER — APPOINTMENT (OUTPATIENT)
Dept: OCCUPATIONAL THERAPY | Facility: CLINIC | Age: 75
DRG: 454 | End: 2020-01-10
Attending: ORTHOPAEDIC SURGERY
Payer: MEDICARE

## 2020-01-10 LAB
ANION GAP SERPL CALCULATED.3IONS-SCNC: 4 MMOL/L (ref 3–14)
BUN SERPL-MCNC: 16 MG/DL (ref 7–30)
CALCIUM SERPL-MCNC: 8.7 MG/DL (ref 8.5–10.1)
CHLORIDE SERPL-SCNC: 109 MMOL/L (ref 94–109)
CO2 SERPL-SCNC: 27 MMOL/L (ref 20–32)
CREAT SERPL-MCNC: 1.02 MG/DL (ref 0.52–1.04)
GFR SERPL CREATININE-BSD FRML MDRD: 54 ML/MIN/{1.73_M2}
GLUCOSE SERPL-MCNC: 104 MG/DL (ref 70–99)
HGB BLD-MCNC: 7.9 G/DL (ref 11.7–15.7)
POTASSIUM SERPL-SCNC: 4.2 MMOL/L (ref 3.4–5.3)
SODIUM SERPL-SCNC: 140 MMOL/L (ref 133–144)

## 2020-01-10 PROCEDURE — 36415 COLL VENOUS BLD VENIPUNCTURE: CPT | Performed by: INTERNAL MEDICINE

## 2020-01-10 PROCEDURE — 97530 THERAPEUTIC ACTIVITIES: CPT | Mod: GO

## 2020-01-10 PROCEDURE — 25000132 ZZH RX MED GY IP 250 OP 250 PS 637: Mod: GY | Performed by: PHYSICIAN ASSISTANT

## 2020-01-10 PROCEDURE — 97535 SELF CARE MNGMENT TRAINING: CPT | Mod: GO

## 2020-01-10 PROCEDURE — 12000001 ZZH R&B MED SURG/OB UMMC

## 2020-01-10 PROCEDURE — 85018 HEMOGLOBIN: CPT | Performed by: INTERNAL MEDICINE

## 2020-01-10 PROCEDURE — 72080 X-RAY EXAM THORACOLMB 2/> VW: CPT

## 2020-01-10 PROCEDURE — 80048 BASIC METABOLIC PNL TOTAL CA: CPT | Performed by: INTERNAL MEDICINE

## 2020-01-10 PROCEDURE — 99232 SBSQ HOSP IP/OBS MODERATE 35: CPT | Performed by: INTERNAL MEDICINE

## 2020-01-10 RX ORDER — HYDROMORPHONE HYDROCHLORIDE 2 MG/1
2 TABLET ORAL EVERY 4 HOURS PRN
Qty: 60 TABLET | Refills: 0 | Status: SHIPPED | OUTPATIENT
Start: 2020-01-10 | End: 2020-01-13

## 2020-01-10 RX ADMIN — DOCUSATE SODIUM 100 MG: 100 CAPSULE, LIQUID FILLED ORAL at 20:49

## 2020-01-10 RX ADMIN — GABAPENTIN 100 MG: 100 CAPSULE ORAL at 09:16

## 2020-01-10 RX ADMIN — HYDROMORPHONE HYDROCHLORIDE 2 MG: 2 TABLET ORAL at 04:36

## 2020-01-10 RX ADMIN — ATORVASTATIN CALCIUM 40 MG: 40 TABLET, FILM COATED ORAL at 20:50

## 2020-01-10 RX ADMIN — PANTOPRAZOLE SODIUM 40 MG: 40 TABLET, DELAYED RELEASE ORAL at 09:16

## 2020-01-10 RX ADMIN — ACETAMINOPHEN 975 MG: 325 TABLET, FILM COATED ORAL at 12:55

## 2020-01-10 RX ADMIN — HYDROXYZINE HYDROCHLORIDE 10 MG: 10 TABLET, FILM COATED ORAL at 17:17

## 2020-01-10 RX ADMIN — POLYETHYLENE GLYCOL 3350 17 G: 17 POWDER, FOR SOLUTION ORAL at 09:15

## 2020-01-10 RX ADMIN — ACETAMINOPHEN 975 MG: 325 TABLET, FILM COATED ORAL at 04:36

## 2020-01-10 RX ADMIN — HYDROMORPHONE HYDROCHLORIDE 2 MG: 2 TABLET ORAL at 21:53

## 2020-01-10 RX ADMIN — PANTOPRAZOLE SODIUM 40 MG: 40 TABLET, DELAYED RELEASE ORAL at 20:50

## 2020-01-10 RX ADMIN — HYDROMORPHONE HYDROCHLORIDE 2 MG: 2 TABLET ORAL at 17:17

## 2020-01-10 RX ADMIN — SENNOSIDES AND DOCUSATE SODIUM 2 TABLET: 8.6; 5 TABLET ORAL at 09:15

## 2020-01-10 RX ADMIN — DOCUSATE SODIUM 100 MG: 100 CAPSULE, LIQUID FILLED ORAL at 09:16

## 2020-01-10 RX ADMIN — SENNOSIDES AND DOCUSATE SODIUM 2 TABLET: 8.6; 5 TABLET ORAL at 20:50

## 2020-01-10 RX ADMIN — HYDROMORPHONE HYDROCHLORIDE 2 MG: 2 TABLET ORAL at 09:16

## 2020-01-10 RX ADMIN — HYDROMORPHONE HYDROCHLORIDE 2 MG: 2 TABLET ORAL at 00:27

## 2020-01-10 RX ADMIN — GABAPENTIN 100 MG: 100 CAPSULE ORAL at 14:22

## 2020-01-10 RX ADMIN — HYDROMORPHONE HYDROCHLORIDE 2 MG: 2 TABLET ORAL at 12:55

## 2020-01-10 ASSESSMENT — ACTIVITIES OF DAILY LIVING (ADL)
ADLS_ACUITY_SCORE: 17

## 2020-01-10 ASSESSMENT — MIFFLIN-ST. JEOR: SCORE: 1498.01

## 2020-01-10 NOTE — PROGRESS NOTES
ADDENDUM 4:30 PM  SW spoke with daughter, collected preferences. See below.    Social Work Services Progress Note    Hospital Day: 4  Collaborated with:  Chart review, pt, medical provider    Data:  SW following for TCU placement    Intervention:  Per medical provider, pt likely ready to discharge this weekend.     Pt/family was given the Medicare Compare list for SNF, with associated star ratings to assist with choice for referrals/discharge planning     Education was given to pt/family that star ratings are updated/maintained by Medicare and can be reviewed by visiting www.medicare.gov     Pt unfamiliar with community TCU's, is from [place]. Pt reports that her daughter works for the [employer] and would be by to visit later. Pt would like to discuss preferences with daughter.    Preferences:  [facility] [employer] [facility]  [address], [place] [address] [phone] call to collect fax #    [facility]  [address] [place] [address]  [phone] call to collect fax#    KATARINA DO  [address] [place] [phone] call to collect fax #    Assessment:  Pt agreeable to TCU    Plan:    Anticipated Disposition:  Facility:  TBD    Barriers to d/c plan:  Medical stability    Follow Up:  Sw to continue following for discharge planning    DANIELLE Tapia  5A/10A Ortho/Med/Surg & [facility]  Phone: [phone] Pager: [phone]

## 2020-01-10 NOTE — PLAN OF CARE
Discharge Planner OT   Patient plan for discharge: TCU  Current status: Pt completed supine<>sit with min A using log roll. Pt completed sit<>stand transfers with CGA and ambulated to bathroom with FWW and CGA. Pt completed toilet task with max A for toilet hygiene and clothing management. Pt educated on LE dressing and demonstrated technique with mod A using AE.    Barriers to return to prior living situation: pain, decreased mobility and IND in ADLs  Recommendations for discharge: TCU  Rationale for recommendations: maximize safety and IND in functional mobility and ADLs       Entered by: Courtney Calderon 01/10/2020 3:17 PM

## 2020-01-10 NOTE — PROGRESS NOTES
"[facility], [place]   Internal Medicine Daily Note           Interval History/Events     Overnight events reviewed  Reports doing well  Denies any nausea, vomiting, chest pain, shortness of breath  No abdominal pain  No BM yet  Passing gas  No burning urination, blood in urine.        Review of Systems        4 point ROS including Respiratory, CV, GI and , other than that noted above is negative      Medications   I have reviewed current medications  in the \"current medication\" section of Epic.  Relevant changes include:     Physical Exam   General:       Vital signs:    Blood pressure 109/48, pulse 81, temperature 97.8  F (36.6  C), temperature source Oral, resp. rate 16, height 1.575 m (5' 2.01\"), weight 104.5 kg (230 lb 4.8 oz), SpO2 98 %.  Estimated body mass index is 42.11 kg/m  as calculated from the following:    Height as of this encounter: 1.575 m (5' 2.01\").    Weight as of this encounter: 104.5 kg (230 lb 4.8 oz).      Intake/Output Summary (Last 24 hours) at 1/9/2020 0951  Last data filed at 1/9/2020 0610  Gross per 24 hour   Intake 142.6 ml   Output 1925 ml   Net -1782.4 ml        Constitutional: Laying on bed in no acute distress  Eye: No icterus, no pallor  Mouth/ENT: Moist oral mucosa  Cardiovascular: S1, S2 normal. No pretibial edema  Respiratory: B/L CTA  GI: Soft, NT, BS+  :   Neurology:  Alert, awake, and oriented. No tremors  Psych: Normal mood  MSK:   Integumentary: Right groin has erythematous patch  Heme/Lymph/Imm:      Laboratory and Imaging Studies     I have reviewed  laboratory and imaging studies in the Epic. Pertinent findings are as below:    BMP  Recent Labs   Lab 01/10/20  0547 01/09/20  0549 01/08/20  1326 01/08/20  0722    137 138 140   POTASSIUM 4.2 4.4 4.6 5.6*   CHLORIDE 109 107 108 111*   SARWAT 8.7 8.4* 7.6* 7.6*   CO2 27 27 23 24   BUN 16 21 24 23   CR 1.02 1.40* 1.49* 1.49*   * 114* 128* 172*     CBC  Recent Labs   Lab " 01/10/20  0547 01/09/20  0549  01/08/20  0722  01/07/20  1415 01/07/20  1155   WBC  --  8.5  --  10.5  --  17.4* 10.8   RBC  --  2.79*  --  3.04*  --  3.32* 3.27*   HGB 7.9* 8.6*   < > 9.3*   < > 10.3*  10.3* 9.9*   HCT  --  26.6*  --  30.2*  --  32.0* 31.5*   MCV  --  95  --  99  --  96 96   MCH  --  30.8  --  30.6  --  31.0 30.3   MCHC  --  32.3  --  30.8*  --  32.2 31.4*   RDW  --  13.9  --  14.0  --  13.3 13.3   PLT  --  104*  --  120*  --  216 244    < > = values in this interval not displayed.     INR  Recent Labs   Lab 01/07/20  1415 01/07/20  1315 01/07/20  1155 01/07/20  1008   INR 1.24* 1.24* 1.15* 1.10     LFTsNo lab results found in last 7 days.   PANCNo lab results found in last 7 days.        Impression/Plan        74 year old female with history of SLE, HTN, GERD, HLD, asthma, and chronic pain who was admitted to ICU 1/7/2020 following redo L1 to pelvis posterior fusion per Dr. Samuels of orthopedics. Transferred to the medical floor 1/8/2020 for ongoing care.      # Pseudarthrosis, broken instrumentation, flat back syndrome s/p redo L1-pelvis posterior spinal fusion: 1/7 per Dr. Samuels. H/o L2-L5 spinal fusion 2016 c/b broken hardware now s/p revision 11/2017. EBL as below. Transferred out of the SICU to the floor 1/8/20.  Management primarily per Ortho team.  On Analgesics  - Wound cares, Dressings, Surgical pain management, DVT Prophylaxis  per primary team.   - Monitor anemia, hemodynamics, Input/Output  - Encourage Incentive spirometry  - Laxatives for constipation prophylaxis     # Anemia: Most likely due to Hemodilution, blood loss, and post surgical inflammation.   BL hgb appears to be normal. EBL 1425 ml with 725 returned via Cell Saver. S/p 1U RBC and 1 unit cryo. Hgb 93 (11.1)  Hg 7.9 gm/dl on 01/10  - Keep hgb >7  - Hg in AM  - Transfuse if Hg < 7 gm/dl       # Nonoliguric SHAYLEE on CKD II: BL Cr not quite clear, likely around 0.9-1.0. Elevated to 1.49 1/8/20. BUN 23. Has been on IVF. Possible  prerenal disease vs mild ATN.   Urine Osmolality 417, and U Na 47.   Creatinine 1.02 on 01/10 which is within normal values  - Strict I&Os, daily standing weights   - Hold lasix  - Renally dose meds avoid nephrotoxics  - Avoid hypotension, hypovolemia  - BMP in AM     # Hyperkalemia: K 5.6 (not hemolyzed) in the setting of IVF with KCl. Likely iatrogenic, SHAYLEE could be contributing as well. BL CKD II  K level 4.4 on 01/09  - Continue to monitor    # HTN: PTA on lasix which was resumed 1/8/2020. Hypotensive to 85/67 overnight with good response to IV bolus. /97 on 01/09 AM  - Hold lasix given SHAYLEE and ongoing slightly soft pressures       # Possible new murmur: II/VI murmur noted on exam. Asymptomatic. Denies prior knowledge of murmur. No prior echo noted  Echo on 01/09 with EF 60-65%, no other abnormalities seen     # L IV infiltration: New edema in left hand. Worse with IV flush. Very likely 2/2 IV infiltration. Unlikely DVT given worsening with IV flush. Contact medicine if worsening edema, pain, new dyspnea, new hypoxia, new hypoxia    # GERD: Continue PPI  # HLD: Continue statin  # Asthma: Resume PTA albuterol   # SLE: Stable, denies flare. No PTA meds    # Diet: Regular Diet Adult    # DVT Prophylaxis: Defer to primary service  # Ibarra Catheter: in place, indication: Strict 1-2 Hour I&O  # Code Status: Full Code          Pt's care was discussed with bedside RN, patient and  during Care Team Rounds.     Kale Ward MD  Hospitalist ( Internal medicine)  Pager: [phone]

## 2020-01-10 NOTE — PLAN OF CARE
Patient declined PM PT because of fatigue due to procedure completed today. Reviewed role of PT and pt decline PT services today. Answered all questions, ensured pt comfort and that all needs were within reach.

## 2020-01-10 NOTE — PLAN OF CARE
Pt up with SBA and walker. Up in chair for meals. CMS intact. Pain meds given as needed. Voiding spontaneously. No BM, Passing flatus. Drain removed this afternoon. Xrays completed. Aquacel dressing changed with drain removal. Pt able to make needs known.

## 2020-01-10 NOTE — PLAN OF CARE
"  VS:    /46 (BP Location: Right arm)   Pulse 82   Temp 98  F (36.7  C) (Oral)   Resp 18   Ht 1.575 m (5' 2.01\")   Wt 105.1 kg (231 lb 9.6 oz)   SpO2 96%   BMI 42.35 kg/m       Output:    Voids spontaneously without difficulty. Hemovac. LBM 01/06/20   Activity:     Assist x1 with walker.    Skin: Wound on R hip red, moist, and without drainage, interdry placed.     Pain:    Managed via prn oral dilaudid and ice packs.    Neuro/CMS:    A&Ox4. Neuros/CMS intact. Denies numbness/tingling in extremities. Weakness in R leg   Dressing(s):    Aquacell CDI.   Diet:    Regular.   Equipment:     Walker, hearing aids, dental appliance.    IV's/Drains:    PIV R arm.    Plan:    Discharge to TCU.    Additional Info:    Standing X-ray tomorrow.            "

## 2020-01-10 NOTE — PROGRESS NOTES
Orthopaedic Surgery Progress Note 01/10/2020    E: No acute events overnight.    S: POD3. Working with PT/OT. Pain controlled. Denies numbness or tingling. Denies cp/sob/n/v. Tolerating oral diet. -BM +flatus. Voiding spontaneously. Plan of care reviewed and questions answered.    O:  Temp: 98.9  F (37.2  C) Temp src: Oral BP: (!) 145/62 Pulse: 80   Resp: 16 SpO2: 95 % O2 Device: None (Room air)      Exam:  Gen: No acute distress, resting comfortably in bed, alert and oriented, cooperative with exam  Cardio: RRR, extremities wwp  Resp: Non-labored breathing  MSK:  Back: Dressing CDI     Lower extremities:  Motor Strength Right Left   Hip flexion: L1, L2, L3 3/5 4/5   Hip adduction: L2, L3 4/5 4/5   Knee flexion: S1 4/5 5/5   Knee extension: L3, L4 4/5 5/5   Ankle dosiflexion: L4, L5 5/5 5/5   EHL: L5 5/5 5/5   Ankle plantarflexion: S1 5/5 5/5      Sensation from L2-S2 is preserved     Drain output: 150/30 ml sanguinous last recorded shifts      Recent Labs   Lab 01/09/20  0549 01/08/20  1326 01/08/20  0722  01/07/20  1415   WBC 8.5  --  10.5  --  17.4*   HGB 8.6* 8.9* 9.3*   < > 10.3*  10.3*   *  --  120*  --  216    < > = values in this interval not displayed.     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 Samuels and Dr. Moyer.     Goals Today:  - Pain control  - PT/OT  - Postop XR     Abril Primary  Activity: Up with assist until independent. No excessive bending or twisting. No lifting >10 lbs x 6 weeks. No Vianca lift for transfers.   Weight bearing status: WBAT.  Pain management:   IV lidocaine: discontinue at 8am on POD#2 or earlier if not tolerated  Transition from IV to PO as tolerated. No NSAIDs.   Antibiotics: Clindamycin x 24 hours - completed.  Diet:  Begin with clear fluids and progress diet as tolerated.   DVT prophylaxis: SCDs only. No chemical DVT ppx needed.  Imaging: XR Upright scoliosis films PTDC - ordered.  Labs: CBC, BMP POD#1.  Bracing/Splinting: None.  Dressings: Keep Aquacel c/d/i x 7 days.  Drains: Hemovac. Document output per shift, will be discontinued at Orthopedic Surgery discretion.  Ibarra catheter: Remove POD#1.   Physical Therapy/Occupational Therapy: Eval and treat.  Cultures: None.    Consults: Hospitalist.  Follow-up: Clinic with Dr. Samuels in 6 weeks with repeat x-rays.   Disposition: Pending progress with therapies, pain control on orals, and medical stability, anticipate discharge to TCU on POD #4-5.    Future Appointments   Date Time Provider Department Center   1/9/2020  7:30 AM Quynh Neves OT [facility] [place]   1/9/2020 10:45 AM Chino Lieberman, PT [facility] [place]   1/9/2020 11:30 AM Chino Lieberman, PT [facility] [place]   1/9/2020  2:30 PM Chino Lieberman, PT [facility] [place]   2/20/2020 11:00 AM Segundo Samuels MD [facility]   3/19/2020 11:00 AM Segundo Samuels MD [facility]       Patient discussed with Dr. Abril Barakat MD, PhD  Orthopedic Surgery PGY4  Pager [phone]    Please page me directly with any questions/concerns during regular weekday hours before 5pm. If there is no response, if it is a weekend, or if it is during evening hours then please page the orthopaedic surgery resident on call.

## 2020-01-10 NOTE — PLAN OF CARE
"      VS:   /46 (BP Location: Right arm)   Pulse 82   Temp 98  F (36.7  C) (Oral)   Resp 18   Ht 1.575 m (5' 2.01\")   Wt 99.5 kg (219 lb 5.7 oz)   SpO2 96%   BMI 40.11 kg/m   on RA   Output:   Voiding urine well in the bathroom. No incontinence of urine.  LBM 1/6/20. + flatus .  Abdomen nont-david and non-distended   Activity:   Up with SBA  and walker. Used bathroom    Skin: Rt groin area skin tear and moisture --interdry in place     Pain:   Well controlled ; scheduled gabapentin and tylenol.  Prn PO dilaudid 2mg q 4hrs.   Neuro/CMS:   Intact. No new weakness or N/T   Dressing(s):   Acuacel --dry and intact   Diet:   Tolerates regular  diet well   LDA:   Off oxygen.  Hemovac --output 80ml , ice  Pack applied to back  , Heb 8.6 today   Equipment:   Walker,    Plan:   Discharge plan :  TCU   Additional Info:   Standing lumbar xray needs to be done tomorrow      "

## 2020-01-11 ENCOUNTER — APPOINTMENT (OUTPATIENT)
Dept: PHYSICAL THERAPY | Facility: CLINIC | Age: 75
DRG: 454 | End: 2020-01-11
Attending: ORTHOPAEDIC SURGERY
Payer: MEDICARE

## 2020-01-11 ENCOUNTER — APPOINTMENT (OUTPATIENT)
Dept: OCCUPATIONAL THERAPY | Facility: CLINIC | Age: 75
DRG: 454 | End: 2020-01-11
Attending: ORTHOPAEDIC SURGERY
Payer: MEDICARE

## 2020-01-11 LAB
ANION GAP SERPL CALCULATED.3IONS-SCNC: 4 MMOL/L (ref 3–14)
BUN SERPL-MCNC: 15 MG/DL (ref 7–30)
CALCIUM SERPL-MCNC: 8.4 MG/DL (ref 8.5–10.1)
CHLORIDE SERPL-SCNC: 107 MMOL/L (ref 94–109)
CO2 SERPL-SCNC: 31 MMOL/L (ref 20–32)
CREAT SERPL-MCNC: 0.87 MG/DL (ref 0.52–1.04)
GFR SERPL CREATININE-BSD FRML MDRD: 65 ML/MIN/{1.73_M2}
GLUCOSE SERPL-MCNC: 114 MG/DL (ref 70–99)
POTASSIUM SERPL-SCNC: 4.1 MMOL/L (ref 3.4–5.3)
SODIUM SERPL-SCNC: 142 MMOL/L (ref 133–144)

## 2020-01-11 PROCEDURE — 12000001 ZZH R&B MED SURG/OB UMMC

## 2020-01-11 PROCEDURE — 25000132 ZZH RX MED GY IP 250 OP 250 PS 637: Mod: GY | Performed by: PHYSICIAN ASSISTANT

## 2020-01-11 PROCEDURE — 97116 GAIT TRAINING THERAPY: CPT | Mod: GP | Performed by: PHYSICAL THERAPIST

## 2020-01-11 PROCEDURE — 97530 THERAPEUTIC ACTIVITIES: CPT | Mod: GP | Performed by: PHYSICAL THERAPIST

## 2020-01-11 PROCEDURE — 97535 SELF CARE MNGMENT TRAINING: CPT | Mod: GO | Performed by: OCCUPATIONAL THERAPIST

## 2020-01-11 PROCEDURE — 80048 BASIC METABOLIC PNL TOTAL CA: CPT | Performed by: INTERNAL MEDICINE

## 2020-01-11 PROCEDURE — 36415 COLL VENOUS BLD VENIPUNCTURE: CPT | Performed by: INTERNAL MEDICINE

## 2020-01-11 PROCEDURE — 99231 SBSQ HOSP IP/OBS SF/LOW 25: CPT | Performed by: INTERNAL MEDICINE

## 2020-01-11 RX ADMIN — PANTOPRAZOLE SODIUM 40 MG: 40 TABLET, DELAYED RELEASE ORAL at 20:06

## 2020-01-11 RX ADMIN — HYDROMORPHONE HYDROCHLORIDE 2 MG: 2 TABLET ORAL at 08:06

## 2020-01-11 RX ADMIN — HYDROMORPHONE HYDROCHLORIDE 2 MG: 2 TABLET ORAL at 03:12

## 2020-01-11 RX ADMIN — ATORVASTATIN CALCIUM 40 MG: 40 TABLET, FILM COATED ORAL at 22:10

## 2020-01-11 RX ADMIN — SENNOSIDES AND DOCUSATE SODIUM 2 TABLET: 8.6; 5 TABLET ORAL at 20:06

## 2020-01-11 RX ADMIN — PANTOPRAZOLE SODIUM 40 MG: 40 TABLET, DELAYED RELEASE ORAL at 08:06

## 2020-01-11 RX ADMIN — DOCUSATE SODIUM 100 MG: 100 CAPSULE, LIQUID FILLED ORAL at 08:06

## 2020-01-11 RX ADMIN — HYDROMORPHONE HYDROCHLORIDE 2 MG: 2 TABLET ORAL at 20:06

## 2020-01-11 RX ADMIN — HYDROMORPHONE HYDROCHLORIDE 2 MG: 2 TABLET ORAL at 15:58

## 2020-01-11 RX ADMIN — HYDROMORPHONE HYDROCHLORIDE 2 MG: 2 TABLET ORAL at 12:01

## 2020-01-11 RX ADMIN — SENNOSIDES AND DOCUSATE SODIUM 2 TABLET: 8.6; 5 TABLET ORAL at 08:06

## 2020-01-11 RX ADMIN — HYDROXYZINE HYDROCHLORIDE 10 MG: 10 TABLET, FILM COATED ORAL at 03:12

## 2020-01-11 RX ADMIN — DOCUSATE SODIUM 100 MG: 100 CAPSULE, LIQUID FILLED ORAL at 20:06

## 2020-01-11 ASSESSMENT — ACTIVITIES OF DAILY LIVING (ADL)
ADLS_ACUITY_SCORE: 17

## 2020-01-11 NOTE — PLAN OF CARE
VS: VSS.   O2: Room air. Denies N/T.   Output: Voiding spontaneously in bathroom.   Last BM: 1/10, had medium BM this shift.   Activity: Up w/ SBA and walker.   Skin: Intact, except for incision.   Pain: C/o of pain in back, managed with PRN dilaudid.   CMS: Intact, denies N/T.   Dressing: CDI.   Diet: Regular, tolerating well.   LDA: No IV access.   Plan: Discharge to a TCU.   Additional Info:

## 2020-01-11 NOTE — PROGRESS NOTES
Orthopaedic Surgery Progress Note 01/10/2020    E: No acute events overnight.    S: POD4. Working with PT/OT, feels that she is improving, but still needs assistance. Pain controlled at rest. Denies new or worsening numbness or tingling. Denies cp/sob/n/v. Tolerating PO intake.  BM yesterday.  Voiding spontaneously. Plan of care reviewed and questions answered. Patient hoping for TCU, referrals ar epending.    O:  Temp: 98.7  F (37.1  C) Temp src: Oral BP: 136/72 Pulse: 84   Resp: 14 SpO2: 98 % O2 Device: None (Room air)      Exam:  Gen: No acute distress, resting comfortably in bed, alert and oriented, cooperative with exam  Cardio: RRR, extremities wwp  Resp: Non-labored breathing  MSK:  Back: Dressing CDI No strikethrough.     Lower extremities:  Motor Strength Right Left   Hip flexion: L1, L2, L3 3/5 4/5   Hip adduction: L2, L3 4/5 4/5   Knee flexion: S1 4/5 5/5   Knee extension: L3, L4 4/5 5/5   Ankle dosiflexion: L4, L5 5/5 5/5   EHL: L5 5/5 5/5   Ankle plantarflexion: S1 5/5 5/5      Sensation from L2-S1 is preserved     Drain output: Removed      Recent Labs   Lab 01/10/20  0547 01/09/20  0549 01/08/20  1326 01/08/20  0722  01/07/20  1415   WBC  --  8.5  --  10.5  --  17.4*   HGB 7.9* 8.6* 8.9* 9.3*   < > 10.3*  10.3*   PLT  --  104*  --  120*  --  216    < > = values in this interval not displayed.     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 Samuels and Dr. Moyer.     Goals Today:  - PT/OT.  -Pending TCU referral  -Anticipate discharge when placement secured.     Abril Primary  Activity: Up with assist until independent. No excessive bending or twisting. No lifting >10 lbs x 6 weeks. No Vianca lift for transfers.   Weight bearing status: WBAT.  Pain management:    PO meds, no NSAIDs  Antibiotics: Clindamycin x 24 hours - completed.  Diet: diet as tolerated.   DVT prophylaxis: SCDs only. No chemical DVT ppx needed.  Bracing/Splinting: None.  Dressings: Keep Aquacel c/d/i x 7 days post op  Physical Therapy/Occupational Therapy: Eval and treat.  Cultures: None.    Consults: Hospitalist.  Follow-up: Clinic with Dr. Samuels in 6 weeks with repeat x-rays.   Disposition: Pending progress with therapies, pain control on orals, and medical stability, anticipate discharge to TCU when placement secured, 3 referrals are pending.    Future Appointments   Date Time Provider Department Center   1/9/2020  7:30 AM Quynh Neves OT [facility] [place]   1/9/2020 10:45 AM Chino Lieberman, PT [facility] [place]   1/9/2020 11:30 AM Chino Lieberman, PT [facility] [place]   1/9/2020  2:30 PM Chino Lieberman, PT [facility] [place]   2/20/2020 11:00 AM Segundo Samuels MD [facility]   3/19/2020 11:00 AM Segundo Samuels MD [facility]       Patient discussed with Dr. Abril Jackson MD  Orthopedic Surgery PGY4  Pager [phone]    Please page me directly with any questions/concerns during regular weekday hours before 5pm. If there is no response, if it is a weekend, or if it is during evening hours then please page the orthopaedic surgery resident on call.

## 2020-01-11 NOTE — PLAN OF CARE
Discharge Planner PT   Patient plan for discharge: TCU  Current status: min assist for supine to/from right sidelying to/from supine with bed flat, no rails; SBA for sit to/from stand from EOB, armchair & toilet with use of grab bar & WW; min assist for pericare & clothing mgmt; SBA/CGA to amb on level 70 ft x 2 with WW  Barriers to return to prior living situation: medical status; assist needed for mobility  Recommendations for discharge: TCU  Rationale for recommendations: will benefit from continued skilled PT intervention to address mobility deficits, improve strength & activity tolerance       Entered by: Renetta Toledo 01/11/2020 10:11 AM

## 2020-01-11 NOTE — PROGRESS NOTES
Social Work Services Progress Note    Hospital Day: 5  Collaborated with: TCU facilities     Data:  SW involved for TCU placement    Intervention:   Update- SW met with pt and pt's daughter to discuss status of referrals. Per pt she would like to go to [facility] in [place], [place]( [address] [phone])     SW sent referrals over to pt's identified TCU selections for consideration.    Assessment:  Pt needing TCU at time of discharge.    Plan:    Anticipated Disposition:  Facility:  D    Barriers to d/c plan:  Bed availability, medical stability    Follow Up:  Unit SW please follow up on status of referrals Monday.    AKSHAT Parmar  Weekend   Pager: [phone]

## 2020-01-11 NOTE — PLAN OF CARE
Discharge Planner OT   Patient plan for discharge: TCU  Current status: SBA supine>sit. Reviewed LB dressing using AE. Demonstrated increasing independence with equipment, completed SBA w/ minimal cueing. Toilet transfer CGA w/ commode overlay, grab bar, and FWW. Discussed toilet tongs for wiping if needed. Ambulating SBA-CGA w/ FWW, tolerating ~50 ft. today. Dixon to BLEs for sit>supine. MaxAx2 to boost.  Barriers to return to prior living situation: pain, level of assist, decreased strength and endurance  Recommendations for discharge: TCU  Rationale for recommendations: To maximize safety and IND w/ ADLs and functional mobility.       Entered by: Alhaji Sarmiento 01/11/2020 3:58 PM

## 2020-01-11 NOTE — DISCHARGE SUMMARY
[facility] [place]  Orthopaedic Discharge Summary    Patient: Carmelita Mina MRN# [id number]   Age/Sex: 74 year old female YOB: 1945      Date of Admission:  1/7/2020  Date of Discharge:  1/13/2020  Admitting Physician:  Segundo Samuels MD  Discharge Physician:  Calvin Barakat MD  Primary Care Provider:  Salvatore Rios    DISCHARGE DIAGNOSIS:  Flatback syndrome [M40.30]  Pseudoarthrosis of lumbar spine [S32.009K]    PROCEDURE(S):   1/7/2020 Procedure(s):  Lumbar 1 to pelvis revision transforaminal lumbar interbody fusion, pedicle subtraction osteotomies Lumbar 4, Smith-Flowers osteotomy Lumbar 1- Lumbar 2  SacroIliac joint fusion with  bedrock     BRIEF HISTORY:  The patient is a 74-year-old adult female with a history of a previous spinal fusion for back and radicular symptoms.  She then developed a broken preeti and this was revised and she has had continuing back and leg pain.  The pain is right L4 radicular type pain.  She has had treatment with acetaminophen and ibuprofen, and injections, physical therapy, and is currently on gabapentin.  Her Oswestry Disability Index score is 44%.  Imaging shows that she has a positive sagittal imbalance with a pelvic incidence lumbar lordosis mismatch.  The pelvic incidence of 67 and a lumbar lordosis of only 36.  Risks, benefits, alternative treatments and expected outcomes were extensively discussed.  It was her desire to proceed with surgical treatment.      HOSPITAL COURSE:    Patient was admitted on 1/7/2020 and underwent the above stated procedure(s). There were no complications and the patient was transferred to the PACU stabilization unit in stable condition with a romo catheter and  drain in place. The lidocaine protocol was followed. The patient was transferred to the floor on POD1 and received routine nursing cares. Medicine was consulted postoperatively to aid in management of medical comorbidities.  A  clear liquid diet was started and advanced to regular on POD1. The romo catheter was removed on per protocol.  PT/OT was initiated on POD1 for safety training, ADLs, mobility and ROM.  Drain was removed when output was minimal.  After demonstrating the ability to tolerate a diet, pain control on PO pain meds, and clearance by PT/OT, patient was deemed medically safe for discharge to a TCU on 1/13/2020.    Antibiotics:  Ancef given preop and 24 hours postop for prophylaxis.  DVT Prophylaxis:  Mechanical  PT/OT Progress: Has met PT/OT goals for safe mobility.   Pain Medications: Weaned off all IV pain meds by time of discharge.  Inpatient Events: No significant events or complications.     DISCHARGE EXAM:  Gen: No acute distress, resting comfortably in bed, alert and oriented, cooperative with exam  Cardio: RRR, extremities wwp  Resp: Non-labored breathing  MSK:  Back: Dressing CDI No strikethrough.     Lower extremities:  Motor Strength Right Left   Hip flexion: L1, L2, L3 3/5 4/5   Hip adduction: L2, L3 4/5 4/5   Knee flexion: S1 4/5 5/5   Knee extension: L3, L4 4/5 5/5   Ankle dosiflexion: L4, L5 5/5 5/5   EHL: L5 5/5 5/5   Ankle plantarflexion: S1 5/5 5/5      Sensation from L2-S1 is preserved      DISCHARGE MEDICATIONS AND PROCEDURES:      Current Discharge Medication List      START taking these medications    Details   HYDROmorphone (DILAUDID) 2 MG tablet Take 1 tablet (2 mg) by mouth every 4 hours as needed  Qty: 60 tablet, Refills: 0    Comments: Insurance limits to apply  Associated Diagnoses: S/P spinal surgery      senna-docusate (SENOKOT-S/PERICOLACE) 8.6-50 MG tablet Take 2 tablets by mouth 2 times daily  Qty: 28 tablet, Refills: 0    Associated Diagnoses: S/P spinal surgery         CONTINUE these medications which have NOT CHANGED    Details   acetaminophen (TYLENOL) 325 MG tablet Take 650 mg by mouth every morning       atorvastatin (LIPITOR) 40 MG tablet Take 40 mg by mouth At Bedtime        diphenhydrAMINE-acetaminophen (TYLENOL PM)  MG tablet Take 2 tablets by mouth At Bedtime      furosemide (LASIX) 20 MG tablet Take 1 tablet by mouth every morning       gabapentin (NEURONTIN) 400 MG capsule Take 400 mg by mouth 2 times daily      Multiple Vitamins-Minerals (MULTIVITAL) TABS Take 1 tablet by mouth every morning      pantoprazole (PROTONIX) 40 MG EC tablet Take 40 mg by mouth 2 times daily       Probiotic Product (PROBIOTIC-10 PO) Take 1 capsule by mouth every morning      valACYclovir (VALTREX) 500 MG tablet Take 1 tablet by mouth as needed   Refills: 3         STOP taking these medications       albuterol (VENTOLIN HFA) 108 (90 Base) MCG/ACT inhaler Comments:   Reason for Stopping:         ibuprofen (ADVIL/MOTRIN) 200 MG tablet Comments:   Reason for Stopping:                 Discharge Procedure Orders   General info for SNF   Order Comments: Length of Stay Estimate: 1-2 weeks  Condition at Discharge: Good  Rehabilitation Potential: Good  Admission H&P remains valid and up-to-date: Yes  Recent Chemotherapy: No  Use Nursing Home Standing Orders: No     Mantoux instructions   Order Comments: Give two-step Mantoux (PPD) Per Facility Policy: per institution policy     Reason for your hospital stay   Order Comments: You had surgery on your back     Wound care   Order Comments: Site:   Back  Instructions:  Keep Aquacel dressing on for 7 days postoperative. Okay to shower once dressing removed. Avoid submerging.     Activity - Up with assistive device     Order Specific Question Answer Comments   Is discharge order? Yes      Additional Discharge Instructions   Order Comments: You are being discharged from hospital cares.    Activities:   - Avoid excessive bending, twisting, or heavy lifting over 10 pounds.    Wound care:   - Your dressing is to remain in place until postoperative day 7 after which you may remove.  - Your stitches will dissolve on their own and do not need to be removed.    - You  may shower keeping your incisions clean and dry. Once your dressing is off you may shower and let water run over your incisions and dab dry, but do not submerge in water for at least 2 weeks and do not rub incisions.   - After the dressing is off, you may leave it open to air or apply a dressing for protection.     When to contact your medical team:  - Call or seek medical attention for pain that continues to worsen, new onset of numbness or tingling, or excessive swelling.  - See a medical provider for new onset of chest pain or shortness of breath; this may be a sign of a heart attack or blood clot in you lungs.  - Contact us if there is any increased drainage, swelling, pain, or redness stemming from your incision; this may be a sign of infection and would need to be looked at immediately.  - To speak with someone from Orthopaedic Surgery call [phone], ask for the orthopaedic resident on call, and provide a call back number.    Follow-up:   - For questions regarding your follow-up appointment at [facility] call [phone].     Adult [facility] Follow-up and recommended labs and tests   Order Comments: As scheduled with Dr. Samuels in 6 weeks    Appointments on [place] and/or [facility] (with [facility] or [facility] provider or service). Call [phone] if you haven't heard regarding these appointments within 7 days of discharge.     Full Code     Order Specific Question Answer Comments   Code status determined by: Unable to determine; FULL CODE until documents or legal decision maker available      Physical Therapy Adult Consult   Order Comments: Evaluate and treat as clinically indicated.    Reason:  Postoperative spine surgery     Occupational Therapy Adult Consult   Order Comments: Evaluate and treat as clinically indicated.    Reason:  Postoperative spine surgery     Fall precautions     Advance Diet as Tolerated   Order Comments: Follow this diet upon discharge:  Normal     Order Specific Question Answer Comments   Is discharge order? Yes          FOLLOWUP:    Future Appointments   Date Time Provider Department Center   1/11/2020  8:30 AM Renetta Toledo, PT [facility] [place]   1/11/2020  1:45 PM Alhaji Sarmiento OT [facility] [place]   1/11/2020  6:30 PM UR PT OVERFLOW [facility] [place]   2/20/2020 11:00 AM [facility]els [facility]   3/19/2020 11:00 AM Segundo Samuels MD [facility]     Appointments at [facility]: [address] [place] [id number]  Please call [phone] if you haven't heard regarding these appointments within 7 days of discharge.    ATTESTATION:  I have reviewed today's vital signs, notes, medications, labs and imaging.    Calvin Barakat MD, PhD  Orthopaedic Surgery Resident, PGY4  [facility]

## 2020-01-11 NOTE — PROGRESS NOTES
"[facility], [place]   Internal Medicine Daily Note           Interval History/Events     Overnight events reviewed  Reports doing well  Pain controlled  No nausea, vomiting, chest pain, shortness of breath or lightheadedness or dizizness  Had BM  No burning urination  Tolerating diet well       Review of Systems        4 point ROS including Respiratory, CV, GI and , other than that noted above is negative      Medications   I have reviewed current medications  in the \"current medication\" section of Epic.  Relevant changes include:     Physical Exam   General:       Vital signs:    Blood pressure 113/74, pulse 84, temperature 97.7  F (36.5  C), temperature source Oral, resp. rate 16, height 1.575 m (5' 2.01\"), weight 104.5 kg (230 lb 4.8 oz), SpO2 97 %.  Estimated body mass index is 42.11 kg/m  as calculated from the following:    Height as of this encounter: 1.575 m (5' 2.01\").    Weight as of this encounter: 104.5 kg (230 lb 4.8 oz).      Intake/Output Summary (Last 24 hours) at 1/9/2020 0951  Last data filed at 1/9/2020 0610  Gross per 24 hour   Intake 142.6 ml   Output 1925 ml   Net -1782.4 ml        Constitutional: Laying on bed in no acute distress  Eye: No icterus, no pallor  Mouth/ENT: Moist oral mucosa  Cardiovascular: S1, S2 normal. No pretibial edema  Respiratory: B/L CTA  GI: Soft, NT, BS+  : No romo's catheter  Neurology:  Alert, awake, and oriented. No tremors  Psych: Normal mood  MSK:   Integumentary:   Heme/Lymph/Imm:      Laboratory and Imaging Studies     I have reviewed  laboratory and imaging studies in the Epic. Pertinent findings are as below:    BMP  Recent Labs   Lab 01/11/20  0531 01/10/20  0547 01/09/20  0549 01/08/20  1326    140 137 138   POTASSIUM 4.1 4.2 4.4 4.6   CHLORIDE 107 109 107 108   SARWAT 8.4* 8.7 8.4* 7.6*   CO2 31 27 27 23   BUN 15 16 21 24   CR 0.87 1.02 1.40* 1.49*   * 104* 114* 128*     CBC  Recent Labs   Lab 01/10/20  0547 " 01/09/20  0549  01/08/20  0722  01/07/20  1415 01/07/20  1155   WBC  --  8.5  --  10.5  --  17.4* 10.8   RBC  --  2.79*  --  3.04*  --  3.32* 3.27*   HGB 7.9* 8.6*   < > 9.3*   < > 10.3*  10.3* 9.9*   HCT  --  26.6*  --  30.2*  --  32.0* 31.5*   MCV  --  95  --  99  --  96 96   MCH  --  30.8  --  30.6  --  31.0 30.3   MCHC  --  32.3  --  30.8*  --  32.2 31.4*   RDW  --  13.9  --  14.0  --  13.3 13.3   PLT  --  104*  --  120*  --  216 244    < > = values in this interval not displayed.     INR  Recent Labs   Lab 01/07/20  1415 01/07/20  1315 01/07/20  1155 01/07/20  1008   INR 1.24* 1.24* 1.15* 1.10     LFTsNo lab results found in last 7 days.   PANCNo lab results found in last 7 days.        Impression/Plan        74 year old female with history of SLE, HTN, GERD, HLD, asthma, and chronic pain who was admitted to ICU 1/7/2020 following redo L1 to pelvis posterior fusion per Dr. Samuels of orthopedics. Transferred to the medical floor 1/8/2020 for ongoing care.      # Pseudarthrosis, broken instrumentation, flat back syndrome s/p redo L1-pelvis posterior spinal fusion: 1/7 per Dr. Samuels. H/o L2-L5 spinal fusion 2016 c/b broken hardware now s/p revision 11/2017. EBL as below. Transferred out of the SICU to the floor 1/8/20.  Pain controlled on current analgesics  - Wound cares, Dressings, Surgical pain management, DVT Prophylaxis  per primary team.   - Monitor anemia, hemodynamics, Input/Output  - Encourage Incentive spirometry  - Laxatives for constipation prophylaxis     # Anemia: Most likely due to Hemodilution, blood loss, and post surgical inflammation.   BL hgb appears to be normal. EBL 1425 ml with 725 returned via Cell Saver. S/p 1U RBC and 1 unit cryo. Hgb 93 (11.1)  Hg 7.9 gm/dl on 01/10  - Keep hgb >7  - Hg in AM  - Transfuse if Hg < 7 gm/dl       # Nonoliguric SHAYLEE on CKD II: BL Cr not quite clear, likely around 0.9-1.0. Elevated to 1.49 1/8/20. BUN 23. Has been on IVF. Possible prerenal disease vs mild ATN.    Urine Osmolality 417, and U Na 47.   Creatinine 0.87   - Strict I&Os, daily standing weights   - Hold lasix  - Renally dose meds avoid nephrotoxics  - Avoid hypotension, hypovolemia     # Hyperkalemia: K 5.6 (not hemolyzed) in the setting of IVF with KCl. Likely iatrogenic, SHAYLEE could be contributing as well. BL CKD II  K level 4.1 on 01/11  - Continue to monitor    # HTN: PTA on lasix which was resumed 1/8/2020. Hypotensive to 85/67 overnight with good response to IV bolus. /97 on 01/09 AM  - Hold lasix given SHAYLEE        # Possible new murmur: II/VI murmur noted on exam. Asymptomatic. Denies prior knowledge of murmur. No prior echo noted  Echo on 01/09 with EF 60-65%, no other abnormalities seen     # L IV infiltration: New edema in left hand. Worse with IV flush. Very likely 2/2 IV infiltration. Unlikely DVT given worsening with IV flush. Contact medicine if worsening edema, pain, new dyspnea, new hypoxia, new hypoxia    # GERD: Continue PPI  # HLD: Continue statin  # Asthma: Resume PTA albuterol   # SLE: Stable, denies flare. No PTA meds    # Diet: Regular Diet Adult    # DVT Prophylaxis: Defer to primary service  # Code Status: Full Code          Pt's care was discussed with bedside RN, patient and  during Care Team Rounds.     Kale Ward MD  Hospitalist ( Internal medicine)  Pager: [phone]

## 2020-01-11 NOTE — PLAN OF CARE
"/      VS:   /72 (BP Location: Right arm)   Pulse 84   Temp 98.7  F (37.1  C) (Oral)   Resp 14   Ht 1.575 m (5' 2.01\")   Wt 104.5 kg (230 lb 4.8 oz)   SpO2 98%   BMI 42.11 kg/m       Output:   Voids spontaneously, LBM 1/10/20   Lungs Clear    Activity:   SBA with walker   Skin: Intact except incision and skin breakdown on R flank/groin area    Pain:   Well controlled on current regimen    Neuro/CMS:   A&O x 4, cms intact (R leg weakness as baseline) No N/T   Dressing(s):   On Back, CDI   Diet:   Regular - tolerating well    LDA:   None    Equipment:   Walker    Plan:   Patient to go to TCU, Family to work with  today   Additional Info:   Patient calm and cooperative and slept most of tonight        "

## 2020-01-12 ENCOUNTER — APPOINTMENT (OUTPATIENT)
Dept: PHYSICAL THERAPY | Facility: CLINIC | Age: 75
DRG: 454 | End: 2020-01-12
Attending: ORTHOPAEDIC SURGERY
Payer: MEDICARE

## 2020-01-12 ENCOUNTER — APPOINTMENT (OUTPATIENT)
Dept: OCCUPATIONAL THERAPY | Facility: CLINIC | Age: 75
DRG: 454 | End: 2020-01-12
Attending: ORTHOPAEDIC SURGERY
Payer: MEDICARE

## 2020-01-12 PROCEDURE — 25000132 ZZH RX MED GY IP 250 OP 250 PS 637: Mod: GY | Performed by: PHYSICIAN ASSISTANT

## 2020-01-12 PROCEDURE — 99231 SBSQ HOSP IP/OBS SF/LOW 25: CPT | Performed by: INTERNAL MEDICINE

## 2020-01-12 PROCEDURE — 25000132 ZZH RX MED GY IP 250 OP 250 PS 637: Mod: GY | Performed by: HOSPITALIST

## 2020-01-12 PROCEDURE — 97116 GAIT TRAINING THERAPY: CPT | Mod: GP

## 2020-01-12 PROCEDURE — 97530 THERAPEUTIC ACTIVITIES: CPT | Mod: GO

## 2020-01-12 PROCEDURE — 97110 THERAPEUTIC EXERCISES: CPT | Mod: GP

## 2020-01-12 PROCEDURE — 97530 THERAPEUTIC ACTIVITIES: CPT | Mod: GP

## 2020-01-12 PROCEDURE — 12000001 ZZH R&B MED SURG/OB UMMC

## 2020-01-12 RX ORDER — ACETAMINOPHEN 325 MG/1
650 TABLET ORAL EVERY 4 HOURS PRN
Status: DISCONTINUED | OUTPATIENT
Start: 2020-01-12 | End: 2020-01-13 | Stop reason: HOSPADM

## 2020-01-12 RX ADMIN — HYDROMORPHONE HYDROCHLORIDE 2 MG: 2 TABLET ORAL at 04:42

## 2020-01-12 RX ADMIN — HYDROMORPHONE HYDROCHLORIDE 2 MG: 2 TABLET ORAL at 21:32

## 2020-01-12 RX ADMIN — ACETAMINOPHEN 650 MG: 325 TABLET, FILM COATED ORAL at 22:04

## 2020-01-12 RX ADMIN — ATORVASTATIN CALCIUM 40 MG: 40 TABLET, FILM COATED ORAL at 21:31

## 2020-01-12 RX ADMIN — HYDROMORPHONE HYDROCHLORIDE 2 MG: 2 TABLET ORAL at 09:07

## 2020-01-12 RX ADMIN — HYDROMORPHONE HYDROCHLORIDE 2 MG: 2 TABLET ORAL at 17:25

## 2020-01-12 RX ADMIN — HYDROMORPHONE HYDROCHLORIDE 2 MG: 2 TABLET ORAL at 00:17

## 2020-01-12 RX ADMIN — DOCUSATE SODIUM 100 MG: 100 CAPSULE, LIQUID FILLED ORAL at 09:07

## 2020-01-12 RX ADMIN — POLYETHYLENE GLYCOL 3350 17 G: 17 POWDER, FOR SOLUTION ORAL at 09:07

## 2020-01-12 RX ADMIN — HYDROMORPHONE HYDROCHLORIDE 2 MG: 2 TABLET ORAL at 12:54

## 2020-01-12 RX ADMIN — PANTOPRAZOLE SODIUM 40 MG: 40 TABLET, DELAYED RELEASE ORAL at 21:32

## 2020-01-12 RX ADMIN — PANTOPRAZOLE SODIUM 40 MG: 40 TABLET, DELAYED RELEASE ORAL at 09:07

## 2020-01-12 RX ADMIN — DOCUSATE SODIUM 100 MG: 100 CAPSULE, LIQUID FILLED ORAL at 21:32

## 2020-01-12 RX ADMIN — HYDROXYZINE HYDROCHLORIDE 10 MG: 10 TABLET, FILM COATED ORAL at 02:01

## 2020-01-12 RX ADMIN — SENNOSIDES AND DOCUSATE SODIUM 2 TABLET: 8.6; 5 TABLET ORAL at 09:07

## 2020-01-12 RX ADMIN — HYDROXYZINE HYDROCHLORIDE 10 MG: 10 TABLET, FILM COATED ORAL at 22:04

## 2020-01-12 ASSESSMENT — ACTIVITIES OF DAILY LIVING (ADL)
ADLS_ACUITY_SCORE: 17

## 2020-01-12 NOTE — PLAN OF CARE
"      VS:   BP (!) 141/74 (BP Location: Right arm)   Pulse 84   Temp 95.2  F (35.1  C) (Oral)   Resp 16   Ht 1.575 m (5' 2.01\")   Wt 104.5 kg (230 lb 4.8 oz)   SpO2 98%   BMI 42.11 kg/m    Denies SOB/CP   Output:   Voiding good amounts yellow urine BS+ flatus + BM 1/11   Activity:   SBA with walker in room   Skin: Intact ex incision   Pain:   Tolerable with PRN dilaudid, ice pack to back   Neuro/CMS:   Intact denies n/t   Dressing(s):   CDI   Diet:   reg   LDA:   PIV SL   Equipment:   Call light within reach. walker   Plan:   TCU Monday?   Additional Info:          "

## 2020-01-12 NOTE — PROGRESS NOTES
Orthopaedic Surgery Progress Note 01/10/2020    E: No acute events overnight.    S: POD5. Working with PT/OT, feels that she is improving, but still needs assistance. Pain controlled at rest. Walked 50 ft with PT yesterday.  Tolerating PO.  Voiding. Last BM on Friday.  No new numbness or tingling. No new weakness reported. Questions answered.  Patient hoping to discharge to TCU when bed available.     O:  Temp: 95.2  F (35.1  C) Temp src: Oral BP: (!) 141/74   Heart Rate: 73 Resp: 16 SpO2: 98 % O2 Device: None (Room air)      Exam:  Gen: No acute distress, resting comfortably in bed, alert and oriented, cooperative with exam  Cardio: RRR, extremities wwp  Resp: Non-labored breathing  MSK:  Back: Dressing CDI No strikethrough.     Lower extremities:  Motor Strength Right Left   Hip flexion: L1, L2, L3 3/5 4/5   Hip adduction: L2, L3 4/5 4/5   Knee flexion: S1 4/5 5/5   Knee extension: L3, L4 4/5 5/5   Ankle dosiflexion: L4, L5 5/5 5/5   EHL: L5 5/5 5/5   Ankle plantarflexion: S1 5/5 5/5      Sensation from L2-S1 is preserved       Recent Labs   Lab 01/10/20  0547 01/09/20  0549 01/08/20  1326 01/08/20  0722  01/07/20  1415   WBC  --  8.5  --  10.5  --  17.4*   HGB 7.9* 8.6* 8.9* 9.3*   < > 10.3*  10.3*   PLT  --  104*  --  120*  --  216    < > = values in this interval not displayed.       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 Samuels and Dr. Moyer.     Goals Today:  - PT/OT.  -Pending TCU referral  -Anticipate discharge when placement secured.     Abril Primary  Activity: Up with assist until independent. No excessive bending or twisting. No lifting >10 lbs x 6 weeks. No Vianca lift for transfers.   Weight bearing status: WBAT.  Pain management:   PO meds, no  NSAIDs  Antibiotics: Clindamycin x 24 hours - completed.  Diet: diet as tolerated.   DVT prophylaxis: SCDs only. No chemical DVT ppx needed.  Bracing/Splinting: None.  Dressings: Keep Aquacel c/d/i x 7 days post op  Physical Therapy/Occupational Therapy: Eval and treat.  Cultures: None.    Consults: Hospitalist.  Follow-up: Clinic with Dr. Samuels in 6 weeks with repeat x-rays.   Disposition: Pending progress with therapies, pain control on orals, and medical stability, anticipate discharge to TCU when placement secured, like Monday based on recent care coordination notes      Future Appointments   Date Time Provider Department Center   1/9/2020  7:30 AM Quynh Neves OT [facility] [place]   1/9/2020 10:45 AM Chino Lieberman, PT [facility] [place]   1/9/2020 11:30 AM Chino Lieberman, PT [facility] [place]   1/9/2020  2:30 PM Chino Lieberman, PT [facility] [place]   2/20/2020 11:00 AM Segundo Samuels MD [facility]   3/19/2020 11:00 AM Segundo Samuels MD [facility]       Patient discussed with Dr. Abril Jackson MD  Orthopedic Surgery PGY4  Pager [phone]    Please page me directly with any questions/concerns during regular weekday hours before 5pm. If there is no response, if it is a weekend, or if it is during evening hours then please page the orthopaedic surgery resident on call.

## 2020-01-12 NOTE — PROGRESS NOTES
"[facility], [place]   Internal Medicine Daily Note           Interval History/Events     Overnight events reviewed  Reports doing well  Pain controlled  Had BM   Tolerating diet  No burning urination  No chest pain  No shortness of breath       Review of Systems        4 point ROS including Respiratory, CV, GI and , other than that noted above is negative      Medications   I have reviewed current medications  in the \"current medication\" section of Epic.  Relevant changes include:     Physical Exam   General:       Vital signs:    Blood pressure 133/74, pulse 84, temperature 97  F (36.1  C), temperature source Oral, resp. rate 16, height 1.575 m (5' 2.01\"), weight 104.5 kg (230 lb 4.8 oz), SpO2 99 %.  Estimated body mass index is 42.11 kg/m  as calculated from the following:    Height as of this encounter: 1.575 m (5' 2.01\").    Weight as of this encounter: 104.5 kg (230 lb 4.8 oz).      Intake/Output Summary (Last 24 hours) at 1/9/2020 0951  Last data filed at 1/9/2020 0610  Gross per 24 hour   Intake 142.6 ml   Output 1925 ml   Net -1782.4 ml        Constitutional: Laying on bed in no acute distress  Eye: No icterus, no pallor  Mouth/ENT: Moist oral mucosa  Cardiovascular: S1, S2 normal. No pretibial edema  Respiratory: B/L CTA  GI: Soft, NT, BS+  : No romo's catheter  Neurology:  Alert, awake, and oriented. No tremors  Psych: Normal mood  MSK:   Integumentary:   Heme/Lymph/Imm:      Laboratory and Imaging Studies     I have reviewed  laboratory and imaging studies in the Epic. Pertinent findings are as below:    BMP  Recent Labs   Lab 01/11/20  0531 01/10/20  0547 01/09/20  0549 01/08/20  1326    140 137 138   POTASSIUM 4.1 4.2 4.4 4.6   CHLORIDE 107 109 107 108   SARAWT 8.4* 8.7 8.4* 7.6*   CO2 31 27 27 23   BUN 15 16 21 24   CR 0.87 1.02 1.40* 1.49*   * 104* 114* 128*     CBC  Recent Labs   Lab 01/10/20  0547 01/09/20  0549  01/08/20  0722  01/07/20  1415 " 01/07/20  1155   WBC  --  8.5  --  10.5  --  17.4* 10.8   RBC  --  2.79*  --  3.04*  --  3.32* 3.27*   HGB 7.9* 8.6*   < > 9.3*   < > 10.3*  10.3* 9.9*   HCT  --  26.6*  --  30.2*  --  32.0* 31.5*   MCV  --  95  --  99  --  96 96   MCH  --  30.8  --  30.6  --  31.0 30.3   MCHC  --  32.3  --  30.8*  --  32.2 31.4*   RDW  --  13.9  --  14.0  --  13.3 13.3   PLT  --  104*  --  120*  --  216 244    < > = values in this interval not displayed.     INR  Recent Labs   Lab 01/07/20  1415 01/07/20  1315 01/07/20  1155 01/07/20  1008   INR 1.24* 1.24* 1.15* 1.10     LFTsNo lab results found in last 7 days.   PANCNo lab results found in last 7 days.        Impression/Plan        74 year old female with history of SLE, HTN, GERD, HLD, asthma, and chronic pain who was admitted to ICU 1/7/2020 following redo L1 to pelvis posterior fusion per Dr. Samuels of orthopedics. Transferred to the medical floor 1/8/2020 for ongoing care.      # Pseudarthrosis, broken instrumentation, flat back syndrome s/p redo L1-pelvis posterior spinal fusion: 1/7 per Dr. Samuels. H/o L2-L5 spinal fusion 2016 c/b broken hardware now s/p revision 11/2017. EBL as below. Transferred out of the SICU to the floor 1/8/20.  Pain controlled on current analgesics  - Wound cares, Dressings, Surgical pain management, DVT Prophylaxis  per primary team.   - Monitor anemia, hemodynamics, Input/Output  - Encourage Incentive spirometry  - Laxatives for constipation prophylaxis     # Anemia: Most likely due to Hemodilution, blood loss, and post surgical inflammation.   BL hgb appears to be normal. EBL 1425 ml with 725 returned via Cell Saver. S/p 1U RBC and 1 unit cryo. Hgb 93 (11.1)  Hg 7.9 gm/dl on 01/10  - Keep hgb >7  - Transfuse if Hg < 7 gm/dl       # Nonoliguric SHAYLEE on CKD II: BL Cr not quite clear, likely around 0.9-1.0. Elevated to 1.49 1/8/20. BUN 23. Has been on IVF. Possible prerenal disease vs mild ATN.   Urine Osmolality 417, and U Na 47.   Creatinine 0.87   -  Strict I&Os, daily standing weights   - Hold lasix  - Renally dose meds avoid nephrotoxics  - Avoid hypotension, hypovolemia     # Hyperkalemia: K 5.6 (not hemolyzed) in the setting of IVF with KCl. Likely iatrogenic, SHAYLEE could be contributing as well. BL CKD II  K level 4.1 on 01/11  - Continue to monitor    # HTN: PTA on lasix which was resumed 1/8/2020. Hypotensive to 85/67 overnight with good response to IV bolus.   - Hold lasix given SHAYLEE        # Possible new murmur: II/VI murmur noted on exam. Asymptomatic. Denies prior knowledge of murmur. No prior echo noted  Echo on 01/09 with EF 60-65%, no other abnormalities seen     # L IV infiltration: New edema in left hand. Worse with IV flush. Very likely 2/2 IV infiltration. Unlikely DVT given worsening with IV flush. Contact medicine if worsening edema, pain, new dyspnea, new hypoxia, new hypoxia    # GERD: Continue PPI  # HLD: Continue statin  # Asthma: Resume PTA albuterol   # SLE: Stable, denies flare. No PTA meds    # Diet: Regular Diet Adult    # DVT Prophylaxis: Defer to primary service  # Code Status: Full Code          Pt's care was discussed with bedside RN, patient and  during Care Team Rounds.     Kale Ward MD  Hospitalist ( Internal medicine)  Pager: [phone]

## 2020-01-12 NOTE — PLAN OF CARE
Pt up with assist of 1 and walker. Pain meds given as needed. CMS intact. Voiding spontaneously. BM yesterday. Tolerating regular diet. Pt able to make needs known. Plan for discharge to TCU. Awaiting placement.

## 2020-01-12 NOTE — PLAN OF CARE
Discharge Planner OT   Patient plan for discharge: TCU  Current status: Patient supine in bed upon arrival and agreeable to therapy. Completes supine> sit with log roll and bed rails. Ambulates to bathroom with FWW and SBA to complete toilet transfer. Pt completes juvenal cares standing up IND, and reports no concerns with wiping. Stands at sink for ~ 6 minutes to complete face washing routine demonstrating increased activity tolerance. Requires min A for RLE to sit>supine. Left with all needs within reach.  Barriers to return to prior living situation: activity tolerance, medical needs, pain  Recommendations for discharge: TCU  Rationale for recommendations: To maximize safety and IND with self-cares and functional mobility.        Entered by: Ray Whatley 01/12/2020 11:24 AM

## 2020-01-12 NOTE — PLAN OF CARE
Pt up with SBA and walker. CMS intact. Dressing C/D/I. New dressing placed on R hip wound. Pain meds given as available. Tolerating regular diet. BM 2 days ago, passing flatus. Plan for discharge to TCU - still awaiting placement. Pt able to make needs known.

## 2020-01-12 NOTE — PLAN OF CARE
Discharge Planner PT   Patient plan for discharge: TCU.  Current status: Pt completes supine <> sit with bedrail and Dixon. Pt transfers sit <> stand with FWW and SBA. Pt ambulates ~100ft 2x with FWW and CGA, slow pace, needing cues for upright posture and decreasing UE WBing through FWW. Pt instructed in seated LE ROM/strengthening exercises.  Barriers to return to prior living situation: Medical status, current mobility / level of assist, post-op precautions.  Recommendations for discharge: TCU.  Rationale for recommendations: Pt will benefit from continued skilled therapy to progress strength, balance, activity tolerance, and mobility safety/IND.

## 2020-01-13 ENCOUNTER — APPOINTMENT (OUTPATIENT)
Dept: PHYSICAL THERAPY | Facility: CLINIC | Age: 75
DRG: 454 | End: 2020-01-13
Attending: ORTHOPAEDIC SURGERY
Payer: MEDICARE

## 2020-01-13 VITALS
BODY MASS INDEX: 40.54 KG/M2 | DIASTOLIC BLOOD PRESSURE: 63 MMHG | HEIGHT: 62 IN | TEMPERATURE: 97.3 F | OXYGEN SATURATION: 99 % | SYSTOLIC BLOOD PRESSURE: 148 MMHG | WEIGHT: 220.3 LBS | RESPIRATION RATE: 18 BRPM | HEART RATE: 84 BPM

## 2020-01-13 PROCEDURE — 97116 GAIT TRAINING THERAPY: CPT | Mod: GP

## 2020-01-13 PROCEDURE — 25000132 ZZH RX MED GY IP 250 OP 250 PS 637: Mod: GY | Performed by: INTERNAL MEDICINE

## 2020-01-13 PROCEDURE — 25000132 ZZH RX MED GY IP 250 OP 250 PS 637: Mod: GY | Performed by: PHYSICIAN ASSISTANT

## 2020-01-13 PROCEDURE — 25000132 ZZH RX MED GY IP 250 OP 250 PS 637: Mod: GY | Performed by: CLINICAL NURSE SPECIALIST

## 2020-01-13 PROCEDURE — 99232 SBSQ HOSP IP/OBS MODERATE 35: CPT | Performed by: INTERNAL MEDICINE

## 2020-01-13 PROCEDURE — 97530 THERAPEUTIC ACTIVITIES: CPT | Mod: GP

## 2020-01-13 RX ORDER — HYDROMORPHONE HYDROCHLORIDE 2 MG/1
4 TABLET ORAL ONCE
Status: COMPLETED | OUTPATIENT
Start: 2020-01-13 | End: 2020-01-13

## 2020-01-13 RX ORDER — GABAPENTIN 100 MG/1
200 CAPSULE ORAL 2 TIMES DAILY
Qty: 180 CAPSULE | Refills: 0 | Status: SHIPPED | OUTPATIENT
Start: 2020-01-13 | End: 2021-04-26

## 2020-01-13 RX ORDER — HYDROMORPHONE HYDROCHLORIDE 2 MG/1
2 TABLET ORAL EVERY 4 HOURS PRN
Qty: 60 TABLET | Refills: 0 | Status: SHIPPED | OUTPATIENT
Start: 2020-01-13 | End: 2021-04-26

## 2020-01-13 RX ORDER — FUROSEMIDE 20 MG
20 TABLET ORAL EVERY MORNING
Status: DISCONTINUED | OUTPATIENT
Start: 2020-01-13 | End: 2020-01-13 | Stop reason: HOSPADM

## 2020-01-13 RX ORDER — GABAPENTIN 100 MG/1
200 CAPSULE ORAL 2 TIMES DAILY
Status: DISCONTINUED | OUTPATIENT
Start: 2020-01-13 | End: 2020-01-13 | Stop reason: HOSPADM

## 2020-01-13 RX ADMIN — PANTOPRAZOLE SODIUM 40 MG: 40 TABLET, DELAYED RELEASE ORAL at 09:19

## 2020-01-13 RX ADMIN — HYDROMORPHONE HYDROCHLORIDE 4 MG: 2 TABLET ORAL at 12:51

## 2020-01-13 RX ADMIN — METHOCARBAMOL 500 MG: 500 TABLET, FILM COATED ORAL at 00:35

## 2020-01-13 RX ADMIN — GABAPENTIN 200 MG: 100 CAPSULE ORAL at 12:28

## 2020-01-13 RX ADMIN — FUROSEMIDE 20 MG: 20 TABLET ORAL at 10:19

## 2020-01-13 RX ADMIN — HYDROMORPHONE HYDROCHLORIDE 4 MG: 2 TABLET ORAL at 10:23

## 2020-01-13 RX ADMIN — HYDROMORPHONE HYDROCHLORIDE 4 MG: 2 TABLET ORAL at 06:27

## 2020-01-13 RX ADMIN — HYDROMORPHONE HYDROCHLORIDE 2 MG: 2 TABLET ORAL at 02:23

## 2020-01-13 RX ADMIN — DOCUSATE SODIUM 100 MG: 100 CAPSULE, LIQUID FILLED ORAL at 09:18

## 2020-01-13 ASSESSMENT — ACTIVITIES OF DAILY LIVING (ADL)
ADLS_ACUITY_SCORE: 17

## 2020-01-13 ASSESSMENT — MIFFLIN-ST. JEOR: SCORE: 1452.65

## 2020-01-13 NOTE — PLAN OF CARE
PT: Patient discharged to rehab    Physical Therapy Discharge Summary    Reason for therapy discharge:    Discharged to transitional care facility.    Progress towards therapy goal(s). See goals on Care Plan in Epic electronic health record for goal details.  Goals partially met.  Barriers to achieving goals:   limited tolerance for therapy.    Therapy recommendation(s):    Continued therapy is recommended.  Rationale/Recommendations:  to progress safety and independence with functional mobility prior to returning home.

## 2020-01-13 NOTE — PLAN OF CARE
Discharge Planner PT   Patient plan for discharge: TCU  Current status: WBAT. Patient completes ambulation in hallway with SBA taking seated breaks between bouts. Patient to transport to TCU with family in truck. Discussed safe truck transfers with options for stepping up to seat. Patient trialed forward and side-stepping options on PT stairs. Returned to room in bed with needs in reach and all questions addressed.  Barriers to return to prior living situation: Medical needs, pain, strength, independence with transfers and functional mobility  Recommendations for discharge: TCU  Rationale for recommendations: To progress independence with functional mobility and strength.       Entered by: Courtney Pang 01/13/2020 10:40 AM

## 2020-01-13 NOTE — PROGRESS NOTES
Social Work Services Progress Note    Hospital Day: 7  Collaborated with:  Chart review, medical team, pt, pt's daughter    Data:  SW following for TCU placement.     Intervention:  SW reaching out to preferences for placement. Per team, pt is medically cleared. Pt's daughter Darlene would like to transfer pt today and is able to transport.     Preferences in order:  [facility]  [address] [place] [address]  [facility] [phone]  Referral faxed to: [phone], per admissions they did not receive the referral over the weekend. SW resent referral, verified that they received the referral sent this AM by writerEmelyn Lopez back up fax if needed [phone].      [facility]    [address] [address]  [phone]   Vm left to verify that referral was received this weekend.    [facility]  [address], [place] [address] [phone]   Fax number: [phone], per admissions no bed availability until tomorrow or Wednesday-did not receive referral. SW holding off on referral-will send if needed.       [facility]    [address] [place] [address]  [phone]  Vm left with Yady DOWLING from admissions    SW updated daughter.    Assessment:  Pt medically cleared today    Plan:    Anticipated Disposition:  Facility:  TBD    Barriers to d/c plan:  Bed availability     Follow Up:  SW to continue following for discharge    DANIELLE Tapia  5A/10A Ortho/Med/Surg & [facility]  Phone: [phone] Pager: [phone]

## 2020-01-13 NOTE — PLAN OF CARE
[facility], Pt was accompanied by her daughter, who will be driving her. Pt left with personal belongings. Pt. received complete discharge paperwork and medications were sent to the [facility] to be filled.    Order was obtained for one time does of dilaudid 4 mg PO prior to ride home (Pt has a 3 hour drive).  Pt. was given times of last dose for all discharge medications in writing on discharge medication sheets. Discharge teaching included: medication, pain management, activity restrictions, dressing changes, and signs and symptoms of infection.      Pt. to follow up with Dr. Simmons in 6 weeks. Pt. had no further questions at the time of discharge and no unmet needs were identified.

## 2020-01-13 NOTE — PLAN OF CARE
VS: Stable.   O2: RA.   Output: Voiding in bathroom, dribbling, brief on.   Last BM: 1/12/20.   Activity: Ax1 with walker.   Skin: Incision, skin tear right pannus.   Pain: Aching pain in back managed with robaxin and dilaudid.   CMS: Intact.   Dressing: CDI.   Diet: Regular.   LDA: No IV access.   Equipment: Walker, IS, PCDs off, call light within reach.   Plan: Continue to monitor. Possible discharge this am.   Additional Info:

## 2020-01-13 NOTE — PROGRESS NOTES
"[facility], [place]   Internal Medicine Daily Note           Interval History/Events     Overnight events reviewed  Reports had a rough night last night  Feels she has been on bed for long time  No nausea, vomiting, chest pain, shortness of breath, lightheadedness or dizziness  Tolerating diet well.        Review of Systems        4 point ROS including Respiratory, CV, GI and , other than that noted above is negative      Medications   I have reviewed current medications  in the \"current medication\" section of Epic.  Relevant changes include:     Physical Exam   General:       Vital signs:    Blood pressure (!) 148/63, pulse 84, temperature 97.3  F (36.3  C), temperature source Oral, resp. rate 18, height 1.575 m (5' 2.01\"), weight 99.9 kg (220 lb 4.8 oz), SpO2 99 %.  Estimated body mass index is 40.28 kg/m  as calculated from the following:    Height as of this encounter: 1.575 m (5' 2.01\").    Weight as of this encounter: 99.9 kg (220 lb 4.8 oz).      Intake/Output Summary (Last 24 hours) at 1/9/2020 0951  Last data filed at 1/9/2020 0610  Gross per 24 hour   Intake 142.6 ml   Output 1925 ml   Net -1782.4 ml        Constitutional: Laying on bed in no acute distress  Eye: No icterus, no pallor  Mouth/ENT: Moist oral mucosa  Cardiovascular: S1, S2 normal. No pretibial edema  Respiratory: B/L CTA  GI: Soft, NT, BS+  : No romo's catheter  Neurology:  Alert, awake, and oriented. No tremors  Psych: Normal mood  MSK:   Integumentary:   Heme/Lymph/Imm:      Laboratory and Imaging Studies     I have reviewed  laboratory and imaging studies in the Epic. Pertinent findings are as below:    BMP  Recent Labs   Lab 01/11/20  0531 01/10/20  0547 01/09/20  0549 01/08/20  1326    140 137 138   POTASSIUM 4.1 4.2 4.4 4.6   CHLORIDE 107 109 107 108   SARWAT 8.4* 8.7 8.4* 7.6*   CO2 31 27 27 23   BUN 15 16 21 24   CR 0.87 1.02 1.40* 1.49*   * 104* 114* 128*     CBC  Recent Labs   Lab " 01/10/20  0547 01/09/20  0549  01/08/20  0722  01/07/20  1415 01/07/20  1155   WBC  --  8.5  --  10.5  --  17.4* 10.8   RBC  --  2.79*  --  3.04*  --  3.32* 3.27*   HGB 7.9* 8.6*   < > 9.3*   < > 10.3*  10.3* 9.9*   HCT  --  26.6*  --  30.2*  --  32.0* 31.5*   MCV  --  95  --  99  --  96 96   MCH  --  30.8  --  30.6  --  31.0 30.3   MCHC  --  32.3  --  30.8*  --  32.2 31.4*   RDW  --  13.9  --  14.0  --  13.3 13.3   PLT  --  104*  --  120*  --  216 244    < > = values in this interval not displayed.     INR  Recent Labs   Lab 01/07/20  1415 01/07/20  1315 01/07/20  1155 01/07/20  1008   INR 1.24* 1.24* 1.15* 1.10     LFTsNo lab results found in last 7 days.   PANCNo lab results found in last 7 days.        Impression/Plan        74 year old female with history of SLE, HTN, GERD, HLD, asthma, and chronic pain who was admitted to ICU 1/7/2020 following redo L1 to pelvis posterior fusion per Dr. Samuels of orthopedics. Transferred to the medical floor 1/8/2020 for ongoing care.      # Pseudarthrosis, broken instrumentation, flat back syndrome s/p redo L1-pelvis posterior spinal fusion: 1/7 per Dr. Samuels. H/o L2-L5 spinal fusion 2016 c/b broken hardware now s/p revision 11/2017. EBL as below. Transferred out of the SICU to the floor 1/8/20.  Pain controlled on current analgesics  - Wound cares, Dressings, Surgical pain management, DVT Prophylaxis  per primary team.   - Monitor anemia, hemodynamics, Input/Output  - Encourage Incentive spirometry  - Laxatives for constipation prophylaxis     # Anemia: Most likely due to Hemodilution, blood loss, and post surgical inflammation.   BL hgb appears to be normal. EBL 1425 ml with 725 returned via Cell Saver. S/p 1U RBC and 1 unit cryo. Hgb 93 (11.1)  Hg 7.9 gm/dl on 01/10  - Keep hgb >7  - Transfuse if Hg < 7 gm/dl       # Nonoliguric SHAYLEE on CKD II: BL Cr not quite clear, likely around 0.9-1.0. Elevated to 1.49 1/8/20. BUN 23. Has been on IVF. Possible prerenal disease vs mild  ATN.   Urine Osmolality 417, and U Na 47.   Creatinine 0.87   - Strict I&Os, daily standing weights   - Hold lasix  - Renally dose meds avoid nephrotoxics  - Avoid hypotension, hypovolemia     # Hyperkalemia: K 5.6 (not hemolyzed) in the setting of IVF with KCl. Likely iatrogenic, SHAYLEE could be contributing as well. BL CKD II  K level 4.1 on 01/11  - Continue to monitor    # HTN: PTA on lasix which was resumed 1/8/2020. Hypotensive to 85/67 overnight with good response to IV bolus.   - Restart Lasix      # Possible new murmur: II/VI murmur noted on exam. Asymptomatic. Denies prior knowledge of murmur. No prior echo noted  Echo on 01/09 with EF 60-65%, no other abnormalities seen     # L IV infiltration: New edema in left hand. Worse with IV flush. Very likely 2/2 IV infiltration. Unlikely DVT given worsening with IV flush. Contact medicine if worsening edema, pain, new dyspnea, new hypoxia, new hypoxia    # GERD: Continue PPI  # HLD: Continue statin  # Asthma: Resume PTA albuterol   # SLE: Stable, denies flare. No PTA meds    # Diet: Regular Diet Adult    # DVT Prophylaxis: Defer to primary service  # Code Status: Full Code          Pt's care was discussed with bedside RN, patient and  during Care Team Rounds.     Kale Ward MD  Hospitalist ( Internal medicine)  Pager: [phone]

## 2020-01-13 NOTE — PLAN OF CARE
VS:   VSS, temp slightly elevated (99.3), LS clear, BS active. Encouraged continued IS use.    Output:   Voiding spontaneously in the BR. Had a BM this afternoon.    Activity:   Up with SBA x 1 and a walker. Sat in spine chair for dinner, walked in the halls this valerie.   Skin: Intact except incisions and minor reddened area in abdominal fold.    Pain:   Managed with 2 mg dilaudid PO every 4 hours.    Neuro/CMS:   Intact. A & O x 4. Right left/foot is slightly warmer than left. Pulses and strengths are intact.    Dressing(s):   Clean, dry, and intact.   Diet:   Regular diet, tolerating well.    LDA:   PIV is SL.    Equipment:   PCD's. Walker   Plan:   TCU- still awaiting placement. Family would like to meet with SW as soon as able tomorrow morning to work this out. Pt and family must travel to [place] to go home and want to beat the snow/storm.    Additional Info:   Call light with in reach. Able to make needs known.

## 2020-01-13 NOTE — PROGRESS NOTES
Nurse to nurse direct line: [phone]    Social Work Services Discharge Note      Patient Name:  Carmelita Mina     Anticipated Discharge Date:  1/13/2020    Discharge Disposition:   TCU:  [facility] MD:  Kale Ward MD     Pre-Admission Screening (PAS) online form has been completed.  The Level of Care (LOC) is:  Determined  Confirmation Code is:  [id number]     Additional Services/Equipment Arranged:  None, daughter transporting      Patient / Family response to discharge plan:  Agreeable     Persons notified of above discharge plan:  Pt, pt's daughter Darlene, Charge Rn, bedside Rn    Staff Discharge Instructions:  Please fax discharge orders and signed hard scripts for any controlled substances.  Please print a packet and send with patient.     CTS Handoff completed:  YES    Medicare Notice of Rights provided to the patient/family:  YES    DANIELLE Tapia  5A/10A Ortho/Med/Surg & [facility] Adult ED  Phone: [phone] Pager: [phone]

## 2020-01-13 NOTE — PROGRESS NOTES
Orthopaedic Surgery Progress Note 01/13/2020    E: No acute events overnight.    S: POD6. Working well with PT/OT. Pain controlled at rest. No new numbness or tingling. No new weakness reported.  Tolerating PO.  Voiding spontaneously. +BM.  Questions answered.  Patient hoping to discharge to TCU when bed available.     O:  Temp: 97.7  F (36.5  C) Temp src: Oral BP: 138/56   Heart Rate: 77 Resp: 16 SpO2: 98 % O2 Device: None (Room air)      Exam:  Gen: No acute distress, resting comfortably in bed, alert and oriented, cooperative with exam  Cardio: RRR, extremities wwp  Resp: Non-labored breathing  MSK:  Back: Dressing CDI No strikethrough.     Lower extremities:  Motor Strength Right Left   Hip flexion: L1, L2, L3 3/5 4/5   Hip adduction: L2, L3 4/5 4/5   Knee flexion: S1 4/5 5/5   Knee extension: L3, L4 4/5 5/5   Ankle dosiflexion: L4, L5 5/5 5/5   EHL: L5 5/5 5/5   Ankle plantarflexion: S1 5/5 5/5      Sensation from L2-S1 is preserved       Recent Labs   Lab 01/10/20  0547 01/09/20  0549 01/08/20  1326 01/08/20  0722  01/07/20  1415   WBC  --  8.5  --  10.5  --  17.4*   HGB 7.9* 8.6* 8.9* 9.3*   < > 10.3*  10.3*   PLT  --  104*  --  120*  --  216    < > = values in this interval not displayed.       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 Samuels and Dr. Moyer.     Goals Today:  -PT/OT.  -Pending TCU, anticipate discharge when placement secured.     Orthopedics Primary  Activity: Up with assist until independent. No excessive bending or twisting. No lifting >10 lbs x 6 weeks. No Vianca lift for transfers.   Weight bearing status: WBAT.  Pain management: PO meds, no NSAIDs.  Antibiotics: Clindamycin x 24 hours - completed.  Diet: Diet as tolerated.   DVT prophylaxis: SCDs only. No  chemical DVT ppx needed.  Bracing/Splinting: None.  Dressings: Keep Aquacel c/d/i x 7 days post op  Physical Therapy/Occupational Therapy: Eval and treat.  Cultures: None.    Consults: Hospitalist.  Follow-up: Clinic with Dr. Samuels in 6 weeks with repeat x-rays.   Disposition: Pending progress with therapies, pain control on orals, and medical stability, anticipate discharge to TCU when placement secured, likely Monday based on recent care coordination notes.      Future Appointments   Date Time Provider Department Center   1/9/2020  7:30 AM Quynh Neves OT [facility] [place]   1/9/2020 10:45 AM Chino Lieberman PT [facility] [place]   1/9/2020 11:30 AM Chino Lieberman PT [facility] [place]   1/9/2020  2:30 PM Chino Lieberman, PT [facility] [place]   2/20/2020 11:00 AM Segundo Samuels MD [facility]   3/19/2020 11:00 AM Segundo Samuels MD [facility]       Patient discussed with Dr. Abril Barakat MD, PhD  Orthopedic Surgery PGY4  Pager [phone]    Please page me directly with any questions/concerns during regular weekday hours before 5pm. If there is no response, if it is a weekend, or if it is during evening hours then please page the orthopaedic surgery resident on call.

## 2020-01-28 DIAGNOSIS — M54.9 BACK PAIN: ICD-10-CM

## 2020-01-28 DIAGNOSIS — M40.30 FLATBACK SYNDROME: ICD-10-CM

## 2020-01-28 DIAGNOSIS — S32.009K PSEUDOARTHROSIS OF LUMBAR SPINE: Primary | ICD-10-CM

## 2020-01-28 RX ORDER — OXYCODONE HYDROCHLORIDE 5 MG/1
TABLET ORAL
Qty: 60 TABLET | Refills: 0 | Status: SHIPPED | OUTPATIENT
Start: 2020-01-28 | End: 2021-04-26

## 2020-01-28 RX ORDER — GABAPENTIN 400 MG/1
CAPSULE ORAL
Qty: 180 CAPSULE | Refills: 0 | Status: SHIPPED | OUTPATIENT
Start: 2020-01-28

## 2020-01-28 NOTE — TELEPHONE ENCOUNTER
Health Call Center    Phone Message    May a detailed message be left on voicemail: yes    Reason for Call: Other: Pt would like a call back regarding pain from hip to hip lower back pain. It has gotten worse and isn't getting better. She would like a call back to see if she can raise her pain medication as she doesn't want to go backwards. Please contact pt to discuss options     Action Taken: Message routed to:  [facility] ([facility] Ortho.    See phone message about pain,. Discharged 1-13-20 S/P 1-7-20 Revision Lumbar Fusion.   I called pt &  back.  She states before she left the TCU they changed her Dilaudid to Oxycodone 5mg 1 tab Q4H prn & has #8 left  & decreased her Gabapentin dose because they thought it might be causing her Diarrhea.  She states Diarrhea resolved & she does not think it was caused by Gabapentin.   was taking 400mg TID before surgery & in hospital they  ordered 200mg BID.   is only taking the Oxycodone 5mg at HS & 0300am but not during day & C/O increased pain above. taking Tylenol 1000mg Q4H so I reminded her she can only take 3000mg per day to avoid  liver or kidney damage, so decrease to 1000mg TID.  She agreed.   Also using Salon Pas patches No relief.  C/O above pain feeling like  heavy nerve pain & wants to increase Gabapentin back up to her usual preop dose 400mg TID.   I advised she also increase Oxycodone to the 5mg Q4H prn as ordered & she agreed.  Refilled Oxycodone.  [place]  [place], Reviewed assessment with Greg MELCHOR in clinic who ordered OK  To increase Gabapentin to 400mg TID & refilled at her pharmacy.  Call back prn. Pt agreed.  [email or web address].EMILY.Greg Lindquist/S.O./Christine Russo RN.

## 2020-02-10 ENCOUNTER — DOCUMENTATION ONLY (OUTPATIENT)
Dept: CARE COORDINATION | Facility: CLINIC | Age: 75
End: 2020-02-10

## 2020-02-14 DIAGNOSIS — Z98.1 S/P SPINAL FUSION: Primary | ICD-10-CM

## 2020-02-17 ASSESSMENT — ENCOUNTER SYMPTOMS
SNORES LOUDLY: 0
SHORTNESS OF BREATH: 0
COUGH DISTURBING SLEEP: 1
SPUTUM PRODUCTION: 1
DYSPNEA ON EXERTION: 0
COUGH: 1
HEMOPTYSIS: 0
WHEEZING: 1
POSTURAL DYSPNEA: 0

## 2020-02-20 ENCOUNTER — ANCILLARY PROCEDURE (OUTPATIENT)
Dept: GENERAL RADIOLOGY | Facility: CLINIC | Age: 75
End: 2020-02-20
Attending: ORTHOPAEDIC SURGERY
Payer: MEDICARE

## 2020-02-20 ENCOUNTER — OFFICE VISIT (OUTPATIENT)
Dept: ORTHOPEDICS | Facility: CLINIC | Age: 75
End: 2020-02-20
Payer: MEDICARE

## 2020-02-20 VITALS — HEIGHT: 63 IN | WEIGHT: 219 LBS | BODY MASS INDEX: 38.8 KG/M2

## 2020-02-20 DIAGNOSIS — Z98.1 S/P SPINAL FUSION: ICD-10-CM

## 2020-02-20 DIAGNOSIS — M96.0 PSEUDARTHROSIS AFTER FUSION OR ARTHRODESIS: Primary | ICD-10-CM

## 2020-02-20 DIAGNOSIS — M40.30 FLATBACK SYNDROME: ICD-10-CM

## 2020-02-20 RX ORDER — LEVOFLOXACIN 500 MG/1
1 TABLET, FILM COATED ORAL DAILY
COMMUNITY
Start: 2020-02-17 | End: 2022-08-25

## 2020-02-20 ASSESSMENT — PATIENT HEALTH QUESTIONNAIRE - PHQ9
SUM OF ALL RESPONSES TO PHQ QUESTIONS 1-9: 2
SUM OF ALL RESPONSES TO PHQ QUESTIONS 1-9: 2
10. IF YOU CHECKED OFF ANY PROBLEMS, HOW DIFFICULT HAVE THESE PROBLEMS MADE IT FOR YOU TO DO YOUR WORK, TAKE CARE OF THINGS AT HOME, OR GET ALONG WITH OTHER PEOPLE: NOT DIFFICULT AT ALL

## 2020-02-20 ASSESSMENT — MIFFLIN-ST. JEOR: SCORE: 1462.38

## 2020-02-20 NOTE — NURSING NOTE
"Reason For Visit:   Chief Complaint   Patient presents with     RECHECK     DOS 1-7-20 Revision Lumbar Fusion for Pseudo & Broken Instrumentation & Flatback        Primary MD: Salvatore Rios     ?  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  Currently working? Yes.  Work status?  Part-time.     Date of surgery & Surgery:    1/7/2020 Posterior spinal fusion L-2.   Pedicle subtraction osteotomy L4.  Lorenzo-Flowers osteotomy L1-2. Transforaminal lumbar interbody fusion L1-L2. Insertion structural intervertebral device L1-2.  Spinal instrumentation L1. Reinsertion spinal instrumentation L2-S1. Pelvic fixation Image-guided surgery. Bilateral sacroiliac joint fusion    11/2017 L4 or L5 Alonzo revision and new hardware  3/2016  Rods place in L3 or L4 to S1     Smoker: No  Request smoking cessation information: No     Ht 1.6 m (5' 2.99\")   Wt 99.3 kg (219 lb)   BMI 38.80 kg/m      Pain Assessment  Patient Currently in Pain: Yes  0-10 Pain Scale: 2  Primary Pain Location: Back  Other Pain Locations: low back  Pain Descriptors: Tightness  Alleviating Factors: Exercise  Aggravating Factors: Sitting(first getting up in am)    Oswestry (IAM) Questionnaire    OSWESTRY DISABILITY INDEX 2/9/2020   Count 8   Sum 13   Oswestry Score (%) 32.5          Visual Analog Pain Scale  Back Pain Scale 0-10: 2  Right leg pain: 0  Left leg pain: 0  Neck Pain Scale 0-10: 0  Right arm pain: 0  Left arm pain: 0    Promis 10 Assessment    PROMIS 10 2/17/2020   In general, would you say your health is: Very good   In general, would you say your quality of life is: Very good   In general, how would you rate your physical health? Very good   In general, how would you rate your mental health, including your mood and your ability to think? Excellent   In general, how would you rate your satisfaction with your social activities and relationships? Excellent   In general, please rate how well you carry out your usual social activities and roles " Excellent   To what extent are you able to carry out your everyday physical activities such as walking, climbing stairs, carrying groceries, or moving a chair? Completely   How often have you been bothered by emotional problems such as feeling anxious, depressed or irritable? Never   How would you rate your fatigue on average? Mild   How would you rate your pain on average?   0 = No Pain  to  10 = Worst Imaginable Pain 2   In general, would you say your health is: 4   In general, would you say your quality of life is: 4   In general, how would you rate your physical health? 4   In general, how would you rate your mental health, including your mood and your ability to think? 5   In general, how would you rate your satisfaction with your social activities and relationships? 5   In general, please rate how well you carry out your usual social activities and roles. (This includes activities at home, at work and in your community, and responsibilities as a parent, child, spouse, employee, friend, etc.) 5   To what extent are you able to carry out your everyday physical activities such as walking, climbing stairs, carrying groceries, or moving a chair? 5   In the past 7 days, how often have you been bothered by emotional problems such as feeling anxious, depressed, or irritable? 1   In the past 7 days, how would you rate your fatigue on average? 2   In the past 7 days, how would you rate your pain on average, where 0 means no pain, and 10 means worst imaginable pain? 2   Global Mental Health Score 19   Global Physical Health Score 17   PROMIS TOTAL - SUBSCORES 36                Loretta Brennan LPN

## 2020-02-20 NOTE — LETTER
"2020       RE: Carmelita Mina  [id number] [facility] [address]     Dear Colleague,    Thank you for referring your patient, Carmelita Mina, to the [facility] at [facility]. Please see a copy of my visit note below.    [facility]  Orthopedics  Segundo Samuels MD  2020     Name: Carmelita Mina  MRN: [id number]  Age: 74 year old  : 1945  Referring provider: Chino Langford     Chief Complaint: RECHECK (DOS 20 Revision Lumbar Fusion for Pseudo & Broken Instrumentation & Flatback )     Date of Surgery: 2020     Procedure:   1.  Posterior spinal fusion L-2.    2.  Pedicle subtraction osteotomy L4.     3.  Lorenzo-Flowers osteotomy L1-2.     4.  Transforaminal lumbar interbody fusion L1-L2.     5.  Insertion structural intervertebral device L1-2.      6.  Spinal instrumentation L1.     7.  Reinsertion spinal instrumentation L2-S1.     8.  Pelvic fixation Image-guided surgery.   9. Bilateral sacroiliac joint fusion    History of Present Illness:   Carmelita Mina is a 74 year old female 6 weeks status-post the above procedure who presents for postoperative evaluation. Today, the patient reports immense satisfaction with the surgery than she has in the three last years. She has also gained an inch in height. No other concerns.    Physical Examination:  Ht 1.6 m (5' 2.99\")   Wt 99.3 kg (219 lb)   BMI 38.80 kg/m       Constitutional - Patient is healthy, well-nourished and appears stated age  Respiratory - Patient is breathing normally and in no respiratory distress.  Skin - No suspicious rashes or lesions. Surgical incisions are well-healed.   Psychiatric - Normal mood and affect.  Cardiovascular - Peripheral pulses are normal.  Eyes - Visual acuity is normal to the written word.  ENT - Hearing intact to the spoken word.  Musculoskeletal - Non-antalgic gait without use of assistive devices.  Tenderness over former muscle " bilaterally    Non tender greater trochanteric bursas   Milldy positive Trendelenberg bilateral     Radiographs:   Radiographs of the Spine -2 views (02/20/2020):    Excellent alignment instrumentation in place.    I have independently reviewed the above imaging studies; the results were discussed with the patient.     Assessment:   74 year old female status post pedicle subtraction osteotomy and extension of fusion to L2. Patient is progressing appropriately.     Plan:     Pool therapy then transition to land    Follow up in 6 weeks     Back in Balance therapy if determined to be needed by PT     Unable to work for 6 more weeks      Scribe Disclosure:  I, Van Berg, am serving as a scribe to document services personally performed by Segundo Samuels MD at this visit, based upon the provider's statements to me. All documentation has been reviewed by the aforementioned provider prior to being entered into the official medical record.     Segundo Samuels MD

## 2020-02-21 ASSESSMENT — PATIENT HEALTH QUESTIONNAIRE - PHQ9: SUM OF ALL RESPONSES TO PHQ QUESTIONS 1-9: 2

## 2020-03-11 ENCOUNTER — TRANSFERRED RECORDS (OUTPATIENT)
Dept: HEALTH INFORMATION MANAGEMENT | Facility: CLINIC | Age: 75
End: 2020-03-11

## 2020-03-11 ENCOUNTER — TELEPHONE (OUTPATIENT)
Dept: ORTHOPEDICS | Facility: CLINIC | Age: 75
End: 2020-03-11

## 2020-03-11 ENCOUNTER — HEALTH MAINTENANCE LETTER (OUTPATIENT)
Age: 75
End: 2020-03-11

## 2020-03-11 NOTE — TELEPHONE ENCOUNTER
Writer faxed signed copy by Dr. Samuels of the outpatient PT eval by Loretta George.   Faxed to [phone]    Loretta Brennan LPN

## 2020-03-13 ENCOUNTER — TELEPHONE (OUTPATIENT)
Dept: ORTHOPEDICS | Facility: CLINIC | Age: 75
End: 2020-03-13

## 2020-03-13 DIAGNOSIS — Z98.1 S/P SPINAL FUSION: Primary | ICD-10-CM

## 2020-03-13 NOTE — TELEPHONE ENCOUNTER
[facility] Call Center    Phone Message    May a detailed message be left on voicemail: yes     Reason for Call: Other: Pt is requesting a call back to discuss if she should postpone her visit next week. She states she has been having some back pain due to a fall about a month ago. Please reach out to Pt to discuss.      Action Taken: Message routed to:  [facility] Ortho    Travel Screening: Not Applicable

## 2020-03-13 NOTE — TELEPHONE ENCOUNTER
Called patient back to discuss. I let her know that ultimately it is her decision whether or not she wants to cancel the appointment. I advised her that it would be smart in her circumstance to be seen since she had a fall and is having pain. I let her know that our clinic has taken precautions in order to make our patients as safe as possible. She decided to keep her appointment.

## 2020-03-17 ENCOUNTER — TRANSFERRED RECORDS (OUTPATIENT)
Dept: HEALTH INFORMATION MANAGEMENT | Facility: CLINIC | Age: 75
End: 2020-03-17

## 2020-03-19 ENCOUNTER — ANCILLARY PROCEDURE (OUTPATIENT)
Dept: GENERAL RADIOLOGY | Facility: CLINIC | Age: 75
End: 2020-03-19
Attending: ORTHOPAEDIC SURGERY
Payer: MEDICARE

## 2020-03-19 ENCOUNTER — OFFICE VISIT (OUTPATIENT)
Dept: ORTHOPEDICS | Facility: CLINIC | Age: 75
End: 2020-03-19
Payer: MEDICARE

## 2020-03-19 DIAGNOSIS — M54.16 LUMBAR RADICULOPATHY, CHRONIC: Primary | ICD-10-CM

## 2020-03-19 DIAGNOSIS — Z98.1 S/P SPINAL FUSION: ICD-10-CM

## 2020-03-19 RX ORDER — GABAPENTIN 100 MG/1
200 CAPSULE ORAL AT BEDTIME
Qty: 60 CAPSULE | Refills: 0 | Status: SHIPPED | OUTPATIENT
Start: 2020-03-19 | End: 2022-08-25

## 2020-03-19 NOTE — NURSING NOTE
Reason For Visit:   Chief Complaint   Patient presents with     RECHECK     DOS 1-7-20 Revision Lumbar Fusion for Pseudo & Broken Instrumentation & Flatback        Primary MD: Salvatore Rios     ?  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  Currently working? Yes.  Work status?  Part-time.     Date of surgery & Surgery:     1/7/2020 Posterior spinal fusion L-2.   Pedicle subtraction osteotomy L4.  Lorenzo-Flowers osteotomy L1-2. Transforaminal lumbar interbody fusion L1-L2. Insertion structural intervertebral device L1-2.  Spinal instrumentation L1. Reinsertion spinal instrumentation L2-S1. Pelvic fixation Image-guided surgery. Bilateral sacroiliac joint fusion     11/2017 L4 or L5 Alonzo revision and new hardware  3/2016  Rods place in L3 or L4 to S1     Smoker: No  Request smoking cessation information: No    There were no vitals taken for this visit.    Pain Assessment  Patient Currently in Pain: Yes  0-10 Pain Scale: 2  Primary Pain Location: Back(low back)  Pain Descriptors: Nagging, Constant  Alleviating Factors: Ice, Heat, NSAIDS    Oswestry (IAM) Questionnaire    OSWESTRY DISABILITY INDEX 3/13/2020   Count 9   Sum 18   Oswestry Score (%) 40          Visual Analog Pain Scale  Back Pain Scale 0-10: 2  Right leg pain: 0(when stand up and at night when getting into be shoots a tinge of pain)  Left leg pain: 0  Neck Pain Scale 0-10: 0  Right arm pain: 0  Left arm pain: 0    Promis 10 Assessment    PROMIS 10 3/13/2020   In general, would you say your health is: Very good   In general, would you say your quality of life is: Very good   In general, how would you rate your physical health? Very good   In general, how would you rate your mental health, including your mood and your ability to think? Very good   In general, how would you rate your satisfaction with your social activities and relationships? Very good   In general, please rate how well you carry out your usual social activities and roles Very good   To  what extent are you able to carry out your everyday physical activities such as walking, climbing stairs, carrying groceries, or moving a chair? Moderately   How often have you been bothered by emotional problems such as feeling anxious, depressed or irritable? Never   How would you rate your fatigue on average? Mild   How would you rate your pain on average?   0 = No Pain  to  10 = Worst Imaginable Pain 3   In general, would you say your health is: 4   In general, would you say your quality of life is: 4   In general, how would you rate your physical health? 4   In general, how would you rate your mental health, including your mood and your ability to think? 4   In general, how would you rate your satisfaction with your social activities and relationships? 4   In general, please rate how well you carry out your usual social activities and roles. (This includes activities at home, at work and in your community, and responsibilities as a parent, child, spouse, employee, friend, etc.) 4   To what extent are you able to carry out your everyday physical activities such as walking, climbing stairs, carrying groceries, or moving a chair? 3   In the past 7 days, how often have you been bothered by emotional problems such as feeling anxious, depressed, or irritable? 1   In the past 7 days, how would you rate your fatigue on average? 2   In the past 7 days, how would you rate your pain on average, where 0 means no pain, and 10 means worst imaginable pain? 3   Global Mental Health Score 17   Global Physical Health Score 15   PROMIS TOTAL - SUBSCORES 32                Loretta Brennan LPN

## 2020-03-19 NOTE — LETTER
3/19/2020       RE: Carmelita Mina  [id number] [facility] [address]     Dear Colleague,    Thank you for referring your patient, Carmelita Mina, to the [facility] at [facility]. Please see a copy of my visit note below.    REASON FOR VISIT: RECHECK    REFERRING PHYSICIAN: Chino Langford     PRIMARY CARE PHYSICIAN: Salvatore Rios    HISTORY OF PRESENT ILLNESS: Carmelita Mina is a 74 year old female who presents for follow-up on L1-pelvis TLIF and PSO with sacroiliac fusion with bed rock performed 1/7/2020.  Overall she is doing well.  She states she is 80% improved from her presurgical condition.   At her last visit she was sent for physical therapy and strengthening, including pool therapy and potential Back In Balance training.  She was assessed in [place] falls at the [employer] pool but was unable to demonstrate the strength to climb out of the pool, so this was delayed.  It was scheduled to start for her after land-based physical therapy on 3/25/2020.  But due to covid virus outbreak, this has been suspended.  She reports that about 4 weeks ago, she fell on the ice falling forward onto her right knee and had some difficulty arising.  She has had some residual stiffness in her low back, but has not noted any other symptoms.  Her concern today is potential hardware loosening or fracture from the fall.  She reports sensation returning across her low back in the surgical area.  She continues to have a sort of dull pain across her low back at lumbosacral junction.  She has concerns about sleeping.  She is always had a hard time getting comfortable and rolling over and repositioning multiple times through the night.  This has been worse in her postoperative time and she gets occasional shooting radicular pain down the right thigh to the knee.  This is a quick jolt.  It can occur while changing position at night or when rising to upright  "posture.  She has been taking Tylenol p.m. and continues at gabapentin 400 mg 3 times daily.     Oswestry score: 40 (previously 32.5)  Cojcro25: 32 (previously 36)  Pain scale: 2/10 (previously 2/10)    PHYSICAL EXAM:   Constitutional - Patient is healthy, well-nourished and appears stated age.    BMI = 38.8    Respiratory - Patient is breathing normally and in no respiratory distress.   Skin - No suspicious rashes or lesions.  Well-healed midline lumbar posterior scar.   Psychiatric - Normal mood and affect.   Cardiovascular - Peripheral pulses are normal.   Eyes - Visual acuity is normal to the written word.   ENT - Hearing intact to the spoken word.   GI - No abdominal distention.   Musculoskeletal - Non-antalgic gait without use of assistive devices.                Thoracic Spine:    Appearance - Normal     Palpation - Non-tender to palpation    Strength/ROM - deferred            Lumbar Spine:    Appearance - Normal     Palpation - Non-tender to palpation over spinous processes or lumbar paraspinals.  Tender and soft tissues around the lumbar region and out over PSIS bilaterally.  Moderately tender over sacroiliac joints bilaterally.  Nontender at greater trochanters bilaterally.    ROM -she can forward flex to bring fingertips about 1 hands breath to the floor.  She can extend about 20 degrees, and this elicits a \"tightness\" sensation across her low back.  She can lean 30 degrees bilaterally also noticing lumbar tightness.  She can rotate 40 degrees bilaterally.    Motor -        LOWER EXTREMITY Left Right   Hip flexion 5/5 5/5   Knee flexion 5/5 5/5   Knee extension 5/5 5/5   Ankle dorsiflexion 5/5 5/5   Ankle plantarflexion 5/5 5/5   Great toe extension 5/5 5/5   Great toe flexion 5/5 5/5        Special tests -     Straight leg raise - Negative bilaterally     Pain with hip ROM - Negative     Facet loading - Negative     Femoral stretch- negative     Neurologic - Sensation intact to light touch " bilaterally.      REFLEXES Left Right                  Patella 1+ 1+   Ankle jerk 1+ trace          IMAGING: AP and lateral EOS views of full body and full spine were obtained today.  These demonstrate no fractures or dislocations.  No broken hardware or loosening evidenced.  Positioning appears similar to postoperative films.  Bed rock fixation to bilateral sacroiliac joints.  Broken screw remnant at about L5 on the left.  Overall alignment quite satisfactory.  Overall alignment improved from her presurgical status. See full radiologic report in chart.    CLINICAL ASSESSMENT:   1.  Patient is making a good recovery from her revision L1-pelvis surgery at about 11 weeks postop.    DISCUSSION/PLAN:   1.  We had a long discussion with the patient regarding her postoperative condition and expectations for the future.  We expect that her nighttime pain will improve as her healing continues we discussed the 6 to 12-week window for pain to calm down.  We suggested continuing walking as her major therapy and she can continue land-based physical therapy as able.  Pool-based therapy is suspended for the time being.  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  She has already made arrangements to be off until the end of April.  We will rediscuss her situation at that time and probably allow her to return to work on a limited basis.  To help with sleeping we suggested, and gave a prescription for increasing her dose of gabapentin to 600 mg nightly (from 400 mg prior).  If this is unsuccessful she can add 25 mg of Benadryl to her Tylenol PM for 50 mg of Benadryl total.  If this is unsuccessful we suggested she talk with her primary care, Dr. Salvatore Rios for consideration to sleep aids.  Return to clinic for reevaluation in about 6 weeks if possible.  If the social situations do not allow this we would like her to obtain AP and lateral x-rays at home and have them sent to us for  evaluation.  All questions and concerns were answered to the patient's apparent satisfaction before leaving the clinic.  She should call with any further questions or concerns that might arise.  Thank you for allowing Dr. Samuels and I to participate in the care of Carmelita Mina.    Respectfully,  Greg Turner PA-C    I saw and evaluated the patient and developed the plan.  Segundo Samuels MD      CC  Copy to patient    [id number] [facility] [address]      Answers for HPI/ROS submitted by the patient on 3/17/2020   General Symptoms: No  Skin Symptoms: No  HENT Symptoms: No  EYE SYMPTOMS: No  HEART SYMPTOMS: No  LUNG SYMPTOMS: No  INTESTINAL SYMPTOMS: No  URINARY SYMPTOMS: No  GYNECOLOGIC SYMPTOMS: No  BREAST SYMPTOMS: No  SKELETAL SYMPTOMS: No  BLOOD SYMPTOMS: No  NERVOUS SYSTEM SYMPTOMS: No  MENTAL HEALTH SYMPTOMS: No      Again, thank you for allowing me to participate in the care of your patient.      Sincerely,    Segundo Samuels MD

## 2020-03-19 NOTE — PROGRESS NOTES
REASON FOR VISIT: RECHECK    REFERRING PHYSICIAN: Chino Langford     PRIMARY CARE PHYSICIAN: Salvatore Rios    HISTORY OF PRESENT ILLNESS: Carmelita Mina is a 74 year old female who presents for follow-up on L1-pelvis TLIF and PSO with sacroiliac fusion with bed rock performed 1/7/2020.  Overall she is doing well.  She states she is 80% improved from her presurgical condition.   At her last visit she was sent for physical therapy and strengthening, including pool therapy and potential Back In Balance training.  She was assessed in Becker falls at the [employer] pool but was unable to demonstrate the strength to climb out of the pool, so this was delayed.  It was scheduled to start for her after land-based physical therapy on 3/25/2020.  But due to covid virus outbreak, this has been suspended.  She reports that about 4 weeks ago, she fell on the ice falling forward onto her right knee and had some difficulty arising.  She has had some residual stiffness in her low back, but has not noted any other symptoms.  Her concern today is potential hardware loosening or fracture from the fall.  She reports sensation returning across her low back in the surgical area.  She continues to have a sort of dull pain across her low back at lumbosacral junction.  She has concerns about sleeping.  She is always had a hard time getting comfortable and rolling over and repositioning multiple times through the night.  This has been worse in her postoperative time and she gets occasional shooting radicular pain down the right thigh to the knee.  This is a quick jolt.  It can occur while changing position at night or when rising to upright posture.  She has been taking Tylenol p.m. and continues at gabapentin 400 mg 3 times daily.     Oswestry score: 40 (previously 32.5)  Vebcjp20: 32 (previously 36)  Pain scale: 2/10 (previously 2/10)    PHYSICAL EXAM:   Constitutional - Patient is healthy, well-nourished and appears stated  "age.    BMI = 38.8    Respiratory - Patient is breathing normally and in no respiratory distress.   Skin - No suspicious rashes or lesions.  Well-healed midline lumbar posterior scar.   Psychiatric - Normal mood and affect.   Cardiovascular - Peripheral pulses are normal.   Eyes - Visual acuity is normal to the written word.   ENT - Hearing intact to the spoken word.   GI - No abdominal distention.   Musculoskeletal - Non-antalgic gait without use of assistive devices.                Thoracic Spine:    Appearance - Normal     Palpation - Non-tender to palpation    Strength/ROM - deferred            Lumbar Spine:    Appearance - Normal     Palpation - Non-tender to palpation over spinous processes or lumbar paraspinals.  Tender and soft tissues around the lumbar region and out over PSIS bilaterally.  Moderately tender over sacroiliac joints bilaterally.  Nontender at greater trochanters bilaterally.    ROM -she can forward flex to bring fingertips about 1 hands breath to the floor.  She can extend about 20 degrees, and this elicits a \"tightness\" sensation across her low back.  She can lean 30 degrees bilaterally also noticing lumbar tightness.  She can rotate 40 degrees bilaterally.    Motor -        LOWER EXTREMITY Left Right   Hip flexion 5/5 5/5   Knee flexion 5/5 5/5   Knee extension 5/5 5/5   Ankle dorsiflexion 5/5 5/5   Ankle plantarflexion 5/5 5/5   Great toe extension 5/5 5/5   Great toe flexion 5/5 5/5        Special tests -     Straight leg raise - Negative bilaterally     Pain with hip ROM - Negative     Facet loading - Negative     Femoral stretch- negative     Neurologic - Sensation intact to light touch bilaterally.      REFLEXES Left Right                  Patella 1+ 1+   Ankle jerk 1+ trace          IMAGING: AP and lateral EOS views of full body and full spine were obtained today.  These demonstrate no fractures or dislocations.  No broken hardware or loosening evidenced.  Positioning appears similar " to postoperative films.  Bed rock fixation to bilateral sacroiliac joints.  Broken screw remnant at about L5 on the left.  Overall alignment quite satisfactory.  Overall alignment improved from her presurgical status. See full radiologic report in chart.    CLINICAL ASSESSMENT:   1.  Patient is making a good recovery from her revision L1-pelvis surgery at about 11 weeks postop.    DISCUSSION/PLAN:   1.  We had a long discussion with the patient regarding her postoperative condition and expectations for the future.  We expect that her nighttime pain will improve as her healing continues we discussed the 6 to 12-week window for pain to calm down.  We suggested continuing walking as her major therapy and she can continue land-based physical therapy as able.  Pool-based therapy is suspended for the time being.  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  She has already made arrangements to be off until the end of April.  We will rediscuss her situation at that time and probably allow her to return to work on a limited basis.  To help with sleeping we suggested, and gave a prescription for increasing her dose of gabapentin to 600 mg nightly (from 400 mg prior).  If this is unsuccessful she can add 25 mg of Benadryl to her Tylenol PM for 50 mg of Benadryl total.  If this is unsuccessful we suggested she talk with her primary care, Dr. Salvatore Rios for consideration to sleep aids.  Return to clinic for reevaluation in about 6 weeks if possible.  If the social situations do not allow this we would like her to obtain AP and lateral x-rays at home and have them sent to us for evaluation.  All questions and concerns were answered to the patient's apparent satisfaction before leaving the clinic.  She should call with any further questions or concerns that might arise.  Thank you for allowing Dr. Samuels and ARMOND to participate in the care of Carmelita FUNEZ Leopoldo.    Respectfully,  Greg  NAY Turner    I saw and evaluated the patient and developed the plan.  Segundo Samuels MD      CC  Copy to patient    [id number] [facility] [address]      Answers for HPI/ROS submitted by the patient on 3/17/2020   General Symptoms: No  Skin Symptoms: No  HENT Symptoms: No  EYE SYMPTOMS: No  HEART SYMPTOMS: No  LUNG SYMPTOMS: No  INTESTINAL SYMPTOMS: No  URINARY SYMPTOMS: No  GYNECOLOGIC SYMPTOMS: No  BREAST SYMPTOMS: No  SKELETAL SYMPTOMS: No  BLOOD SYMPTOMS: No  NERVOUS SYSTEM SYMPTOMS: No  MENTAL HEALTH SYMPTOMS: No

## 2020-04-20 ENCOUNTER — TELEPHONE (OUTPATIENT)
Dept: ORTHOPEDICS | Facility: CLINIC | Age: 75
End: 2020-04-20

## 2020-04-20 DIAGNOSIS — Z98.1 S/P SPINAL FUSION: Primary | ICD-10-CM

## 2020-04-20 NOTE — TELEPHONE ENCOUNTER
Writer faxed over imaging orders to [facility], [phone].    Loretta Brennan LPN

## 2020-04-21 ENCOUNTER — TELEPHONE (OUTPATIENT)
Dept: ORTHOPEDICS | Facility: CLINIC | Age: 75
End: 2020-04-21

## 2020-04-21 NOTE — TELEPHONE ENCOUNTER
[facility] Call Center    Phone Message    May a detailed message be left on voicemail: yes     Reason for Call: Other:   Pt is under the impression that 'Loretta' or 'Christine' was going to set up an xray for pt at [facility]. Pt would like to know if that has been set up yet. Please call back.     Action Taken: Other:  ortho    Travel Screening: Not Applicable

## 2020-04-21 NOTE — TELEPHONE ENCOUNTER
Writer called and talked with pt on the phone. Writer faxed over the imaging orders yesterday to [facility]. Pt should be able to call and schedule a time with them to have the imaging done. Then have the imaging sent to [facility].  Pt states that she will call and schedule with them now.     Loretta Brennan LPN

## 2020-04-22 ENCOUNTER — TRANSFERRED RECORDS (OUTPATIENT)
Dept: HEALTH INFORMATION MANAGEMENT | Facility: CLINIC | Age: 75
End: 2020-04-22

## 2020-04-28 ENCOUNTER — TELEPHONE (OUTPATIENT)
Dept: ORTHOPEDICS | Facility: CLINIC | Age: 75
End: 2020-04-28

## 2020-04-30 ENCOUNTER — TELEPHONE (OUTPATIENT)
Dept: ORTHOPEDICS | Facility: CLINIC | Age: 75
End: 2020-04-30

## 2020-04-30 ENCOUNTER — VIRTUAL VISIT (OUTPATIENT)
Dept: ORTHOPEDICS | Facility: CLINIC | Age: 75
End: 2020-04-30
Payer: MEDICARE

## 2020-04-30 VITALS — BODY MASS INDEX: 40.12 KG/M2 | HEIGHT: 62 IN | WEIGHT: 218 LBS

## 2020-04-30 DIAGNOSIS — Z98.1 S/P SPINAL FUSION: Primary | ICD-10-CM

## 2020-04-30 DIAGNOSIS — S32.009K PSEUDOARTHROSIS OF LUMBAR SPINE: ICD-10-CM

## 2020-04-30 DIAGNOSIS — M46.1 SACROILIITIS (H): ICD-10-CM

## 2020-04-30 DIAGNOSIS — M40.30 FLATBACK SYNDROME: ICD-10-CM

## 2020-04-30 RX ORDER — DIPHENHYDRAMINE CITRATE AND IBUPROFEN 38; 200 MG/1; MG/1
2 TABLET, COATED ORAL PRN
COMMUNITY
Start: 2020-04-01 | End: 2022-08-25

## 2020-04-30 ASSESSMENT — MIFFLIN-ST. JEOR: SCORE: 1442.09

## 2020-04-30 NOTE — TELEPHONE ENCOUNTER
Writer called and left a voice message notifying pt that a work letter has been written up for her. Pt is to call in and notify the clinic of pt's work fax number that the letter can be sent to.     Loretta Brennan LPN

## 2020-04-30 NOTE — PROGRESS NOTES
"Reason For Visit:   Chief Complaint   Patient presents with     RECHECK     #[phone] 6 wk f/u last apt DOS 1-7-20 Revision Lumbar Fusion for Pseudo & Broken Instrumentation & Flatback        Primary MD: Salvatore Rios     ?  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  Currently working? Yes.  Work status?  Part-time.     Date of surgery & Surgery:     1/7/2020 Posterior spinal fusion L-2.   Pedicle subtraction osteotomy L4.  Lorenzo-Flowers osteotomy L1-2. Transforaminal lumbar interbody fusion L1-L2. Insertion structural intervertebral device L1-2.  Spinal instrumentation L1. Reinsertion spinal instrumentation L2-S1. Pelvic fixation Image-guided surgery. Bilateral sacroiliac joint fusion     11/2017 L4 or L5 Alonzo revision and new hardware  3/2016  Rods place in L3 or L4 to S1     Smoker: No  Request smoking cessation information: No    Ht 1.575 m (5' 2\")   Wt 98.9 kg (218 lb)   BMI 39.87 kg/m    Per pt report    Pain Assessment  0-10 Pain Scale: 2  Primary Pain Location: Back    Oswestry (IAM) Questionnaire    OSWESTRY DISABILITY INDEX 4/17/2020   Count 10   Sum 12   Oswestry Score (%) 24          Visual Analog Pain Scale  Back Pain Scale 0-10: 1.5  Right leg pain: 0  Left leg pain: 0  Neck Pain Scale 0-10: 0  Right arm pain: 0  Left arm pain: 0    Promis 10 Assessment    PROMIS 10 4/17/2020   In general, would you say your health is: Very good   In general, would you say your quality of life is: Very good   In general, how would you rate your physical health? Good   In general, how would you rate your mental health, including your mood and your ability to think? Very good   In general, how would you rate your satisfaction with your social activities and relationships? Very good   In general, please rate how well you carry out your usual social activities and roles Good   To what extent are you able to carry out your everyday physical activities such as walking, climbing stairs, carrying groceries, or " "moving a chair? Moderately   How often have you been bothered by emotional problems such as feeling anxious, depressed or irritable? Rarely   How would you rate your fatigue on average? Mild   How would you rate your pain on average?   0 = No Pain  to  10 = Worst Imaginable Pain 2   In general, would you say your health is: 4   In general, would you say your quality of life is: 4   In general, how would you rate your physical health? 3   In general, how would you rate your mental health, including your mood and your ability to think? 4   In general, how would you rate your satisfaction with your social activities and relationships? 4   In general, please rate how well you carry out your usual social activities and roles. (This includes activities at home, at work and in your community, and responsibilities as a parent, child, spouse, employee, friend, etc.) 3   To what extent are you able to carry out your everyday physical activities such as walking, climbing stairs, carrying groceries, or moving a chair? 3   In the past 7 days, how often have you been bothered by emotional problems such as feeling anxious, depressed, or irritable? 2   In the past 7 days, how would you rate your fatigue on average? 2   In the past 7 days, how would you rate your pain on average, where 0 means no pain, and 10 means worst imaginable pain? 2   Global Mental Health Score 16   Global Physical Health Score 14   PROMIS TOTAL - SUBSCORES 30          Loretta ANNE MARIE Brennan    Carmelita Mina is a 74 year old female who is being evaluated via a billable video visit.      The patient has been notified of following:     \"This video visit will be conducted via a call between you and your physician/provider. We have found that certain health care needs can be provided without the need for an in-person physical exam.  This service lets us provide the care you need with a video conversation.  If a prescription is necessary we can send it " "directly to your pharmacy.  If lab work is needed we can place an order for that and you can then stop by our lab to have the test done at a later time.    Video visits are billed at different rates depending on your insurance coverage.  Please reach out to your insurance provider with any questions.    If during the course of the call the physician/provider feels a video visit is not appropriate, you will not be charged for this service.\"    Patient has given verbal consent for Video visit? yes    How would you like to obtain your AVS? Corey    Patient would like the video invitation sent by: Text to cell phone: [phone]    Will anyone else be joining your video visit? No        Video-Visit Details    Type of service:  Video Visit    Video Start Time: 10:04  Video End Time:     Originating Location (pt. Location): Home    Distant Location (provider location):  [facility]     Platform used for Video Visit: Doximity    Gabapentin at night is helping and she able to sleep now. She feels 90% better than before surgery. This is the best she has felt in years.  She has been walking. Was using ski poles for balance. Now doing ok without it.    xrays from [facility] 4/22/2020  Alignment is excellent. No change in instrumentation. Had prior inferior endplate compression of UIV +1 that is unchanged. Coronal alignment excellent.    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 DOing quite well.    P: Increase activities slowly. Pool therapy when available. Gabapentin at night as long as needed (could work with primary on this).  Work- may return to work starting 5/1/2020, half days (4 hrs) 3D/wk sedentary activities. Lifting limitation 10#.  F/U in 3 months, full spine xrays and a Simmons view of the pelvis.    Total contact time for visit 15 minutes.    Segundo Samuels MD          "

## 2020-05-01 ENCOUNTER — TELEPHONE (OUTPATIENT)
Dept: ORTHOPEDICS | Facility: CLINIC | Age: 75
End: 2020-05-01

## 2020-05-01 NOTE — TELEPHONE ENCOUNTER
[facility] Call Center    Phone Message    May a detailed message be left on voicemail: yes     Reason for Call: Other:   Pt requested for a return to work letter faxed over today.     Pt states that letter should have a return date of today so pt can be added to next wk's schedule.      Fax #: [phone], please include employee ID # [id number]    Action Taken: Other:  ortho    Travel Screening: Not Applicable

## 2020-05-08 NOTE — TELEPHONE ENCOUNTER
[facility] Call Center    Phone Message    May a detailed message be left on voicemail: yes     Reason for Call: Other: Pt called back and stated the fax was not received. Pt would like to request the letter to be fax to [phone], attn: [email or web address]. Thanks.     Action Taken: Message routed to:  [facility] ORTHO    Travel Screening: Not Applicable

## 2020-08-14 DIAGNOSIS — M40.30 FLATBACK SYNDROME: ICD-10-CM

## 2020-08-14 DIAGNOSIS — Z98.1 S/P SPINAL FUSION: Primary | ICD-10-CM

## 2020-08-20 ENCOUNTER — ANCILLARY PROCEDURE (OUTPATIENT)
Dept: GENERAL RADIOLOGY | Facility: CLINIC | Age: 75
End: 2020-08-20
Attending: ORTHOPAEDIC SURGERY
Payer: MEDICARE

## 2020-08-20 ENCOUNTER — OFFICE VISIT (OUTPATIENT)
Dept: ORTHOPEDICS | Facility: CLINIC | Age: 75
End: 2020-08-20
Payer: MEDICARE

## 2020-08-20 VITALS — BODY MASS INDEX: 39.19 KG/M2 | HEIGHT: 63 IN | WEIGHT: 221.2 LBS

## 2020-08-20 DIAGNOSIS — S32.009K PSEUDOARTHROSIS OF LUMBAR SPINE: ICD-10-CM

## 2020-08-20 DIAGNOSIS — H81.399 PERIPHERAL VERTIGO, UNSPECIFIED LATERALITY: ICD-10-CM

## 2020-08-20 DIAGNOSIS — M46.1 SACROILIITIS (H): ICD-10-CM

## 2020-08-20 DIAGNOSIS — M40.30 FLATBACK SYNDROME: Primary | ICD-10-CM

## 2020-08-20 DIAGNOSIS — Z98.1 S/P SPINAL FUSION: ICD-10-CM

## 2020-08-20 ASSESSMENT — MIFFLIN-ST. JEOR: SCORE: 1472.36

## 2020-08-20 NOTE — PROGRESS NOTES
HISTORY OF PRESENT ILLNESS:  She is now 6 months out from an extension of her fusion to L2 with an L4 pedicle subtraction osteotomy.  She feels that she is 80% better than before the surgery, and when I asked what specifically was, she said it is less pain.  She still has her balance issues that were ongoing and was supposed to be addressed right about the time that COVID hit.  She is back to work part-time at [employer] and they want her to work more.      PHYSICAL EXAMINATION:  Physical exam reveals a well-developed, well-nourished female in no acute distress.  Evaluation of her stance shows normal coronal and sagittal alignment.  Evaluation of her gait shows that she is able to heel and toe walk, in terms of muscle strength, but that she does have a little bit of balance issues.  Her incision is well-healed.  Her spine is specifically nontender to percussion throughout the thoracic and lumbar spine.  She does note some tenderness over the bilateral sacroiliac joints.      IMAGING:  Performed today includes AP and lateral EOS imaging and an AP of her pelvis.  The instrumentation is in place.  There was a question about is there a change at the anterior inferior endplate of L1, and I reviewed this with Dr. Wooten from Radiology and that it is right at the edge of is it a reliably discernible difference or not, and so with her non-tenderness to percussion I do not think that this is an acute issue and that she and I looked at this together as well.      ASSESSMENT:   1.  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  She can increase her activities as tolerated.  We will keep her lifting limitations to 10-20 pounds.  She is due to come back and see me at the 1-year anniversary of the surgery.  If there are problems, issues or concerns, she is welcome to come back sooner.  If she is  continuing to have some buttock issues or pain, we would consider getting a CT scan of the pelvis prior to her visit and I would also want to see the L4 pedicle subtraction osteotomy to assess healing at that point and that if the pain problems are significant we could consider bilateral SI joint injections prior to the visit as well.       Answers for HPI/ROS submitted by the patient on 8/6/2020   General Symptoms: No  Skin Symptoms: No  HENT Symptoms: No  EYE SYMPTOMS: No  HEART SYMPTOMS: No  LUNG SYMPTOMS: No  INTESTINAL SYMPTOMS: No  URINARY SYMPTOMS: No  GYNECOLOGIC SYMPTOMS: No  BREAST SYMPTOMS: No  SKELETAL SYMPTOMS: No  BLOOD SYMPTOMS: No  NERVOUS SYSTEM SYMPTOMS: No  MENTAL HEALTH SYMPTOMS: No

## 2020-08-20 NOTE — LETTER
Return to Work  2020     Seen today: yes    Patient:  Carmelita Mina  :   1945  MRN:     [id number]  Physician: SEGUNDO MARINELLI    Carmelita Mina may return to work on Date: 20.      The next clinic appointment is scheduled for  6 months.    Patient limitations:  Max 6 hours  per day.  Light Duty.            Electronically signed by Segundo Marinelli MD

## 2020-08-20 NOTE — NURSING NOTE
"Reason For Visit:   Chief Complaint   Patient presents with     RECHECK     DOS 1-7-20 Revision Lumbar Fusion for Pseudo & Broken Instrumentation & Flatback       Primary MD: Salvatore Rios  Ref. MD: est     ?  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  Currently working? Yes.  Work status?  Part-time.     Date of surgery & Surgery:     1/7/2020 Posterior spinal fusion L-2.   Pedicle subtraction osteotomy L4.  Lorenzo-Flowers osteotomy L1-2. Transforaminal lumbar interbody fusion L1-L2. Insertion structural intervertebral device L1-2.  Spinal instrumentation L1. Reinsertion spinal instrumentation L2-S1. Pelvic fixation Image-guided surgery. Bilateral sacroiliac joint fusion     11/2017 L4 or L5 Alonzo revision and new hardware  3/2016  Rods place in L3 or L4 to S1     Smoker: No  Request smoking cessation information: No    Ht 1.6 m (5' 2.99\")   Wt 100.3 kg (221 lb 3.2 oz)   BMI 39.19 kg/m      Pain Assessment  Patient Currently in Pain: Yes  0-10 Pain Scale: 1  Primary Pain Location: Back    Oswestry (IAM) Questionnaire    OSWESTRY DISABILITY INDEX 8/6/2020   Count 9   Sum 13   Oswestry Score (%) 28.89            Visual Analog Pain Scale  Back Pain Scale 0-10: 1  Right leg pain: 0  Left leg pain: 0  Neck Pain Scale 0-10: 0  Right arm pain: 0  Left arm pain: 0    Promis 10 Assessment    PROMIS 10 8/6/2020   In general, would you say your health is: Very good   In general, would you say your quality of life is: Very good   In general, how would you rate your physical health? Very good   In general, how would you rate your mental health, including your mood and your ability to think? Very good   In general, how would you rate your satisfaction with your social activities and relationships? Very good   In general, please rate how well you carry out your usual social activities and roles Very good   To what extent are you able to carry out your everyday physical activities such as walking, climbing stairs, carrying " groceries, or moving a chair? Mostly   How often have you been bothered by emotional problems such as feeling anxious, depressed or irritable? Never   How would you rate your fatigue on average? Mild   How would you rate your pain on average?   0 = No Pain  to  10 = Worst Imaginable Pain 2   In general, would you say your health is: 4   In general, would you say your quality of life is: 4   In general, how would you rate your physical health? 4   In general, how would you rate your mental health, including your mood and your ability to think? 4   In general, how would you rate your satisfaction with your social activities and relationships? 4   In general, please rate how well you carry out your usual social activities and roles. (This includes activities at home, at work and in your community, and responsibilities as a parent, child, spouse, employee, friend, etc.) 4   To what extent are you able to carry out your everyday physical activities such as walking, climbing stairs, carrying groceries, or moving a chair? 4   In the past 7 days, how often have you been bothered by emotional problems such as feeling anxious, depressed, or irritable? 1   In the past 7 days, how would you rate your fatigue on average? 2   In the past 7 days, how would you rate your pain on average, where 0 means no pain, and 10 means worst imaginable pain? 2   Global Mental Health Score 17   Global Physical Health Score 16   PROMIS TOTAL - SUBSCORES 33                Loretta Brennan LPN

## 2020-08-20 NOTE — LETTER
8/20/2020         RE: Carmelita Mina  [address] [facility] [address]        Dear Colleague,    Thank you for referring your patient, Carmelita Mina, to the [facility]. Please see a copy of my visit note below.    HISTORY OF PRESENT ILLNESS:  She is now 6 months out from an extension of her fusion to L2 with an L4 pedicle subtraction osteotomy.  She feels that she is 80% better than before the surgery, and when I asked what specifically was, she said it is less pain.  She still has her balance issues that were ongoing and was supposed to be addressed right about the time that COVID hit.  She is back to work part-time at [employer] and they want her to work more.      PHYSICAL EXAMINATION:  Physical exam reveals a well-developed, well-nourished female in no acute distress.  Evaluation of her stance shows normal coronal and sagittal alignment.  Evaluation of her gait shows that she is able to heel and toe walk, in terms of muscle strength, but that she does have a little bit of balance issues.  Her incision is well-healed.  Her spine is specifically nontender to percussion throughout the thoracic and lumbar spine.  She does note some tenderness over the bilateral sacroiliac joints.      IMAGING:  Performed today includes AP and lateral EOS imaging and an AP of her pelvis.  The instrumentation is in place.  There was a question about is there a change at the anterior inferior endplate of L1, and I reviewed this with Dr. Wooten from Radiology and that it is right at the edge of is it a reliably discernible difference or not, and so with her non-tenderness to percussion I do not think that this is an acute issue and that she and I looked at this together as well.      ASSESSMENT:   1.  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  She can increase her  activities as tolerated.  We will keep her lifting limitations to 10-20 pounds.  She is due to come back and see me at the 1-year anniversary of the surgery.  If there are problems, issues or concerns, she is welcome to come back sooner.  If she is continuing to have some buttock issues or pain, we would consider getting a CT scan of the pelvis prior to her visit and I would also want to see the L4 pedicle subtraction osteotomy to assess healing at that point and that if the pain problems are significant we could consider bilateral SI joint injections prior to the visit as well.       Answers for HPI/ROS submitted by the patient on 8/6/2020   General Symptoms: No  Skin Symptoms: No  HENT Symptoms: No  EYE SYMPTOMS: No  HEART SYMPTOMS: No  LUNG SYMPTOMS: No  INTESTINAL SYMPTOMS: No  URINARY SYMPTOMS: No  GYNECOLOGIC SYMPTOMS: No  BREAST SYMPTOMS: No  SKELETAL SYMPTOMS: No  BLOOD SYMPTOMS: No  NERVOUS SYSTEM SYMPTOMS: No  MENTAL HEALTH SYMPTOMS: No      Again, thank you for allowing me to participate in the care of your patient.        Sincerely,        Segundo Samuels MD

## 2020-09-22 DIAGNOSIS — Z98.1 S/P SPINAL FUSION: Primary | ICD-10-CM

## 2020-10-12 ENCOUNTER — TRANSFERRED RECORDS (OUTPATIENT)
Dept: HEALTH INFORMATION MANAGEMENT | Facility: CLINIC | Age: 75
End: 2020-10-12

## 2020-11-12 ENCOUNTER — MYC MEDICAL ADVICE (OUTPATIENT)
Dept: SURGERY | Facility: CLINIC | Age: 75
End: 2020-11-12

## 2020-11-13 NOTE — TELEPHONE ENCOUNTER
See my Chart message.  See last dictation 8-20-20.  See 9-22-20 Triage notes ordered CT pelvis for postop continued buttock pain.    I called pt & told her we got fax of report ( scanned in media tab) of CT pelvis done 10-12-20 at [facility] ph#[phone] but did not receive imaging disc in postal mail yet , but we will watch for it & if  not received , call them.  She said she spoke to Bonnie at clinic last week who said they will send it.    C/O continued Buttock pain making it hard to ride or sit but slightly better lately.  Doing Land PT & plans to do Pool therapy in Winter.  I advised a RTN Video appt., with  to F/U on pain & CT result & she agreed.  appt made.    I also  informed pt of Incidental Finding on  [facility] report result stating 4 cm Left Ovarian Cyst & Radiologist suggested Ultrasound F/U.  I asked pt to call her Primary MD to F/U for them to decide if they want to order an US. She agreed & said Her Primary is in same records system as [facility] so they will be able to view CT.    Call back prn. Pt agreed.  Christine Russo RN.

## 2020-12-09 ENCOUNTER — TRANSFERRED RECORDS (OUTPATIENT)
Dept: HEALTH INFORMATION MANAGEMENT | Facility: CLINIC | Age: 75
End: 2020-12-09

## 2020-12-18 ENCOUNTER — TRANSFERRED RECORDS (OUTPATIENT)
Dept: HEALTH INFORMATION MANAGEMENT | Facility: CLINIC | Age: 75
End: 2020-12-18

## 2021-01-04 DIAGNOSIS — Z98.1 S/P SPINAL FUSION: Primary | ICD-10-CM

## 2021-01-07 ENCOUNTER — ANCILLARY PROCEDURE (OUTPATIENT)
Dept: GENERAL RADIOLOGY | Facility: CLINIC | Age: 76
End: 2021-01-07
Attending: ORTHOPAEDIC SURGERY
Payer: MEDICARE

## 2021-01-07 ENCOUNTER — OFFICE VISIT (OUTPATIENT)
Dept: ORTHOPEDICS | Facility: CLINIC | Age: 76
End: 2021-01-07
Payer: MEDICARE

## 2021-01-07 VITALS — BODY MASS INDEX: 39.81 KG/M2 | WEIGHT: 224.7 LBS | HEIGHT: 63 IN

## 2021-01-07 DIAGNOSIS — Z98.1 S/P SPINAL FUSION: Primary | ICD-10-CM

## 2021-01-07 DIAGNOSIS — G57.03 PIRIFORMIS SYNDROME OF BOTH SIDES: ICD-10-CM

## 2021-01-07 PROCEDURE — 72190 X-RAY EXAM OF PELVIS: CPT | Performed by: RADIOLOGY

## 2021-01-07 PROCEDURE — 77073 BONE LENGTH STUDIES: CPT | Performed by: STUDENT IN AN ORGANIZED HEALTH CARE EDUCATION/TRAINING PROGRAM

## 2021-01-07 PROCEDURE — 99214 OFFICE O/P EST MOD 30 MIN: CPT | Performed by: ORTHOPAEDIC SURGERY

## 2021-01-07 PROCEDURE — 72082 X-RAY EXAM ENTIRE SPI 2/3 VW: CPT | Performed by: STUDENT IN AN ORGANIZED HEALTH CARE EDUCATION/TRAINING PROGRAM

## 2021-01-07 ASSESSMENT — MIFFLIN-ST. JEOR: SCORE: 1480.1

## 2021-01-07 NOTE — PROGRESS NOTES
HISTORY OF PRESENT ILLNESS:  She returns now 1 year out from a revision posterior spinal fusion L2 to the pelvis with a pedicle subtraction osteotomy and bedrock fixation of her bilateral SI joints.  She currently states she is 75% better than she was before the surgery and she can put her socks on for the first time in 5 years.  She does have bilateral buttock pain.  She also notes that she does have ongoing balance issues and that she has had 1 fall in the past that has left her a little bit scared.  Visual analog pain scale today is a 2.  Oswestry Disability Index score is 26.      PHYSICAL EXAMINATION:  Exam shows a well-developed, well-nourished female in no acute distress.  Evaluation of her stance shows good overall sagittal and coronal alignment.  She is exquisitely tender over the posterior superior hip capsule, right greater than left.  A figure-of-4 cross leg leaning forward reproduces her pain.        IMAGING:  EOS imaging had been obtained initially.  I reviewed this and compared this to prior imaging and overall alignment appears good.  She does have anterior inferior L1 compression, which has not changed in the last 6 months.  Because of her piriformis syndrome, I wanted to get a Simmons view today and so she was sent back to Radiology and a Simmons view obtained and I reviewed that.  This appears stable.  There is a question of slight halo formation about the iliac tips of the implants, but this certainly is not worse and perhaps may be artifact versus a real finding  given that she does not seem to have pain in that area.      ASSESSMENT:   1.  Flat back syndrome, status post pedicle substraction osteotomy with good result.   2.  Piriformis syndrome bilaterally, right greater than left.      PLAN:  I took her to the [employer] website, showed her piriformis stretching, explained this to her and she will start doing this.  In addition, we will give her a physical therapy consult in order to work with a  folks at home.  If she cannot find someone in [facility] who can do this for her, then she can have 1 or 2 visits in the [facility], and then they can coordinate with her therapist.  If things do not work to resolve or improve her symptoms, I would refer her to Dr. Veronica Medina from [facility] and he could do a series of blocks and potential Botox injection for her piriformis syndrome.  In terms of routine followup, I would like to see her back at the 2-year anniversary of her surgery.  If she is not making progress on the piriformis syndrome and has done the other interventions, I would be happy to see her back to talk to her about that a little bit further.      Total contact time for this visit exceeded 30 minutes.         Answers for HPI/ROS submitted by the patient on 1/4/2021   General Symptoms: No  Skin Symptoms: No  HENT Symptoms: No  EYE SYMPTOMS: No  HEART SYMPTOMS: No  LUNG SYMPTOMS: No  INTESTINAL SYMPTOMS: No  URINARY SYMPTOMS: No  GYNECOLOGIC SYMPTOMS: No  BREAST SYMPTOMS: No  SKELETAL SYMPTOMS: No  BLOOD SYMPTOMS: No  NERVOUS SYSTEM SYMPTOMS: No  MENTAL HEALTH SYMPTOMS: No

## 2021-01-07 NOTE — NURSING NOTE
"Reason For Visit:   Chief Complaint   Patient presents with     RECHECK     CT done 10/12/20, MRI done 12/9/20 in [facility] 1-7-20 Revision Lumbar Fusion for Pseudo & Broken Instrumentation & Flatback        Primary MD: Salvatore Rios  Ref. MD: Est    ?  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  Currently working? Yes.  Work status?  Part-time.     Date of surgery & Surgery:     1/7/2020 Posterior spinal fusion L-2.   Pedicle subtraction osteotomy L4.  Lorenzo-Flowers osteotomy L1-2. Transforaminal lumbar interbody fusion L1-L2. Insertion structural intervertebral device L1-2.  Spinal instrumentation L1. Reinsertion spinal instrumentation L2-S1. Pelvic fixation Image-guided surgery. Bilateral sacroiliac joint fusion     11/2017 L4 or L5 Alonzo revision and new hardware  3/2016  Rods place in L3 or L4 to S1     Smoker: No  Request smoking cessation information: No    Ht 1.595 m (5' 2.8\")   Wt 101.9 kg (224 lb 11.2 oz)   BMI 40.06 kg/m      Pain Assessment  Patient Currently in Pain: Yes  0-10 Pain Scale: 2    Oswestry (IAM) Questionnaire    OSWESTRY DISABILITY INDEX 1/4/2021   Count 10   Sum 13   Oswestry Score (%) 26          Visual Analog Pain Scale  Back Pain Scale 0-10: 2  Right leg pain: 0  Left leg pain: 0  Neck Pain Scale 0-10: 0  Right arm pain: 0  Left arm pain: 0    Promis 10 Assessment    PROMIS 10 1/4/2021   In general, would you say your health is: Good   In general, would you say your quality of life is: Very good   In general, how would you rate your physical health? Very good   In general, how would you rate your mental health, including your mood and your ability to think? Very good   In general, how would you rate your satisfaction with your social activities and relationships? Very good   In general, please rate how well you carry out your usual social activities and roles Very good   To what extent are you able to carry out your everyday physical activities such as walking, climbing " stairs, carrying groceries, or moving a chair? Moderately   How often have you been bothered by emotional problems such as feeling anxious, depressed or irritable? Rarely   How would you rate your fatigue on average? None   How would you rate your pain on average?   0 = No Pain  to  10 = Worst Imaginable Pain 3   In general, would you say your health is: 3   In general, would you say your quality of life is: 4   In general, how would you rate your physical health? 4   In general, how would you rate your mental health, including your mood and your ability to think? 4   In general, how would you rate your satisfaction with your social activities and relationships? 4   In general, please rate how well you carry out your usual social activities and roles. (This includes activities at home, at work and in your community, and responsibilities as a parent, child, spouse, employee, friend, etc.) 4   To what extent are you able to carry out your everyday physical activities such as walking, climbing stairs, carrying groceries, or moving a chair? 3   In the past 7 days, how often have you been bothered by emotional problems such as feeling anxious, depressed, or irritable? 2   In the past 7 days, how would you rate your fatigue on average? 1   In the past 7 days, how would you rate your pain on average, where 0 means no pain, and 10 means worst imaginable pain? 3   Global Mental Health Score 16   Global Physical Health Score 16   PROMIS TOTAL - SUBSCORES 32                Loretta Brennan LPN

## 2021-01-07 NOTE — LETTER
1/7/2021         RE: Carmelita Mina  [address] [facility] [address]        Dear Colleague,    Thank you for referring your patient, Carmelita Mina, to the [facility] [place]. Please see a copy of my visit note below.    HISTORY OF PRESENT ILLNESS:  She returns now 1 year out from a revision posterior spinal fusion L2 to the pelvis with a pedicle subtraction osteotomy and bedrock fixation of her bilateral SI joints.  She currently states she is 75% better than she was before the surgery and she can put her socks on for the first time in 5 years.  She does have bilateral buttock pain.  She also notes that she does have ongoing balance issues and that she has had 1 fall in the past that has left her a little bit scared.  Visual analog pain scale today is a 2.  Oswestry Disability Index score is 26.      PHYSICAL EXAMINATION:  Exam shows a well-developed, well-nourished female in no acute distress.  Evaluation of her stance shows good overall sagittal and coronal alignment.  She is exquisitely tender over the posterior superior hip capsule, right greater than left.  A figure-of-4 cross leg leaning forward reproduces her pain.        IMAGING:  EOS imaging had been obtained initially.  I reviewed this and compared this to prior imaging and overall alignment appears good.  She does have anterior inferior L1 compression, which has not changed in the last 6 months.  Because of her piriformis syndrome, I wanted to get a Simmons view today and so she was sent back to Radiology and a Simmons view obtained and I reviewed that.  This appears stable.  There is a question of slight halo formation about the iliac tips of the implants, but this certainly is not worse and perhaps may be artifact versus a real finding  given that she does not seem to have pain in that area.      ASSESSMENT:   1.  Flat back syndrome, status post pedicle substraction osteotomy with good result.   2.   Piriformis syndrome bilaterally, right greater than left.      PLAN:  I took her to the [employer] website, showed her piriformis stretching, explained this to her and she will start doing this.  In addition, we will give her a physical therapy consult in order to work with a folks at home.  If she cannot find someone in [facility] who can do this for her, then [place] and then they can coordinate with her therapist.  If things do not work to resolve or improve her symptoms, I would refer her to Dr. Veronica Medina from [facility] and he could do a series of blocks and potential Botox injection for her piriformis syndrome.  In terms of routine followup, I would like to see her back at the 2-year anniversary of her surgery.  If she is not making progress on the piriformis syndrome and has done the other interventions, I would be happy to see her back to talk to her about that a little bit further.      Total contact time for this visit exceeded 30 minutes.         Answers for HPI/ROS submitted by the patient on 1/4/2021   General Symptoms: No  Skin Symptoms: No  HENT Symptoms: No  EYE SYMPTOMS: No  HEART SYMPTOMS: No  LUNG SYMPTOMS: No  INTESTINAL SYMPTOMS: No  URINARY SYMPTOMS: No  GYNECOLOGIC SYMPTOMS: No  BREAST SYMPTOMS: No  SKELETAL SYMPTOMS: No  BLOOD SYMPTOMS: No  NERVOUS SYSTEM SYMPTOMS: No  MENTAL HEALTH SYMPTOMS: No

## 2021-03-17 ENCOUNTER — MYC MEDICAL ADVICE (OUTPATIENT)
Dept: ORTHOPEDICS | Facility: CLINIC | Age: 76
End: 2021-03-17

## 2021-03-17 DIAGNOSIS — S32.009K PSEUDOARTHROSIS OF LUMBAR SPINE: ICD-10-CM

## 2021-03-17 DIAGNOSIS — M40.30 FLATBACK SYNDROME: ICD-10-CM

## 2021-03-17 DIAGNOSIS — M43.16 SPONDYLOLISTHESIS OF LUMBAR REGION: ICD-10-CM

## 2021-03-17 DIAGNOSIS — Z98.1 S/P SPINAL FUSION: ICD-10-CM

## 2021-03-17 DIAGNOSIS — G57.03 PIRIFORMIS SYNDROME OF BOTH SIDES: Primary | ICD-10-CM

## 2021-04-25 ENCOUNTER — HEALTH MAINTENANCE LETTER (OUTPATIENT)
Age: 76
End: 2021-04-25

## 2021-04-26 ENCOUNTER — OFFICE VISIT (OUTPATIENT)
Dept: PHYSICAL MEDICINE AND REHAB | Facility: CLINIC | Age: 76
End: 2021-04-26
Attending: ORTHOPAEDIC SURGERY
Payer: MEDICARE

## 2021-04-26 VITALS
WEIGHT: 222 LBS | BODY MASS INDEX: 39.58 KG/M2 | DIASTOLIC BLOOD PRESSURE: 89 MMHG | RESPIRATION RATE: 16 BRPM | OXYGEN SATURATION: 97 % | HEART RATE: 74 BPM | SYSTOLIC BLOOD PRESSURE: 146 MMHG

## 2021-04-26 DIAGNOSIS — G57.00 PIRIFORMIS SYNDROME, UNSPECIFIED LATERALITY: Primary | ICD-10-CM

## 2021-04-26 DIAGNOSIS — Z98.1 S/P SPINAL FUSION: ICD-10-CM

## 2021-04-26 DIAGNOSIS — M62.838 SPASM OF MUSCLE: ICD-10-CM

## 2021-04-26 DIAGNOSIS — M43.28 FUSION OF SACRAL REGION OF SPINE: ICD-10-CM

## 2021-04-26 DIAGNOSIS — M54.17 RADICULAR PAIN OF LUMBOSACRAL REGION: ICD-10-CM

## 2021-04-26 DIAGNOSIS — M79.18 PIRIFORMIS MUSCLE PAIN: ICD-10-CM

## 2021-04-26 PROCEDURE — 20552 NJX 1/MLT TRIGGER POINT 1/2: CPT | Performed by: PHYSICAL MEDICINE & REHABILITATION

## 2021-04-26 PROCEDURE — 99205 OFFICE O/P NEW HI 60 MIN: CPT | Mod: 24 | Performed by: PHYSICAL MEDICINE & REHABILITATION

## 2021-04-26 PROCEDURE — 76942 ECHO GUIDE FOR BIOPSY: CPT | Performed by: PHYSICAL MEDICINE & REHABILITATION

## 2021-04-26 ASSESSMENT — PAIN SCALES - GENERAL: PAINLEVEL: SEVERE PAIN (6)

## 2021-04-26 NOTE — PROCEDURES
PROCEDURE NOTE: Piriformis Muscle Corticosteroid Injection Under Ultrasound Guidance    PROCEDURE DATE: 4/26/2021    PATIENT NAME: Carmelita Mina  YOB: 1945    ATTENDING PHYSICIAN: Veronica Medina MD  FELLOW/RESIDENT PHYSICIAN: None    PREOPERATIVE DIAGNOSIS:   Piriformis syndrome, Right  Piriformis muscle pain  Radicular pain of lumbosacral region  Spasm of muscle  S/P spinal fusion  POSTOPERATIVE DIAGNOSIS: same    PROCEDURE PERFORMED: Right Piriformis Muscle Corticosteroid Injection Under Ultrasound Guidance    ULTRASOUND WAS USED.    INDICATIONS FOR THE PROCEDURE:  Carmelita Mina is a 75 year old female who presents with piriformis syndrome (right>left).     PROCEDURE AND FINDINGS:  She was greeted in the clinic. The risk, benefits and alternatives to the procedure were again reviewed with her and informed consent was obtained and the patient agreed to proceed. A time-out was performed. Following review alternatives, benefits and risks, the procedure was carried out under sterile prep with sterile gel. The use of direct sonographic guidance was used to ensure accurate placement of the needle (rather than non-guided injection) and required to minimize the risk of bleeding or injury to nearby neurovascular structures.     A 5-1MHz ultrasound transducer was used to visualize the relevant structures and determine the optimal needle path for the procedure.  2mL 1% lidocaine was infiltrated at the needle insertion site subcutaneously. Then, a 22 gauge 5 inch needle was advanced from lateral to medail utilizing an in-plane approach in long-axis, under continuous ultrasound guidance to the piriformis muscle on the right. After negative aspiration, slow injection of the treatment solution 4mL total consisting of 1mL Kenalog (40mg/mL) and 3mL of 1% Lidocaine was instilled into affected area. The tip of the needle was visualized throughout the procedure. The remainder of the single-use vials were  discarded.      Before the procedure, she reported a pain score of 5/10. After the procedure, she reported a pain score of 0/10.    She tolerated the procedure well, was discharged home in stable condition.     Follow-up will be via video visit x3-4 weeks.     COMPLICATIONS: None    COMMENTS: None

## 2021-04-26 NOTE — PROGRESS NOTES
Patient seen at the request of Dr. Segundo Samuels for an opinion and evaluation of piriformis syndrome.      HISTORY OF PRESENT ILLNESS:  Carmelita Mina is a 75 year old female who presents with a chief complaint of bilateral gluteal pain.  Patient is accompanied by her daughter today.    She has a complicated history of lumbosacral pain and multiple surgeries dating back to 2016 with initial preeti placed in L3 or L4-S1 with subsequent revision new hardware in 2017 which was performed by an outside surgeon.  Most recently she has been seen by my colleague in orthopedic spine surgery, Dr. 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  Following her last surgery, she states that her pain and symptoms are significantly improved overall including improved function.  She does continue to have bilateral gluteal/buttock pain.  She followed up with the orthopedic spine surgery clinic January 7, 2021 and felt to have bilateral piriformis syndrome and referred to our [facility] for further evaluation management.  At that time, she was also referred back to physical therapy for focused piriformis stretching and strengthening program.  She has been attending physical therapy with some interval improvement in her symptoms.  It seems that they have been performing manual therapies over the gluteal and piriformis muscles which is initially painful but does provide some release and relief.  She continues some intermittent but not consistent home exercise program.    Currently, she localizes pain to the bilateral gluteal region which is the majority of her pain.  Pain is worse on the right compared to the left (80: 20%).  She also has some radiating symptoms to the lateral thigh to the level of the knee.  Pain score  5/10 today.  Symptoms are worse with generalized activity and prolonged positioning, particularly when seated -car rides are difficult for her.  Symptoms are improved with relative rest and position changes.  She takes ibuprofen and gabapentin for pain management.  She is otherwise relatively independent with ADLs and mobility but has significant limitations, particularly with mobility.      PRIOR INJURIES/TREATMENT:   Physical Therapy:   Multiple courses of prior PT. She has has current, ongoing PT closer to home for Piriformis program.       - Current Pain Medications -   Ibuprofen   Gabapentin     Prior Procedures:  Multiple prior lumbar interventional pain procedures prior to surgery         Prior Related Surgery per Orthopedic spine surgery documentation:  1/7/2020 - Posterior spinal fusion L-2.   Pedicle subtraction osteotomy L4.  Lorenzo-Flowers osteotomy L1-2. Transforaminal lumbar interbody fusion L1-L2. Insertion structural intervertebral device L1-2.  Spinal instrumentation L1. Reinsertion spinal instrumentation L2-S1. Pelvic fixation Image-guided surgery. Bilateral sacroiliac joint fusion     11/2017 - L4 or L5 Alonzo revision and new hardware  03/2016 - Rods place in L3 or L4 to S1     Specialists Seen - (with most recent, available notes and clinic visits reviewed)   1. Orthopedic spine surgery - Dr. Segundo Samuels     IMAGING - reviewed   01/07/2021 XR Spine and Pelvis  Impression:  1. Redemonstration of fusion instrumentation extending from L1 through  bilateral SI joints. No definite evidence of hardware failure.  2. No substantial degenerative change.  3. No global sagittal imbalance     Review Of Systems:   I am responding to those symptoms which are directly relevant to the specific indication for my consultation. I recommend that the patient follow up with their primary or referring provider to pursue any other symptoms which may be of concern.    Medical History:  She  has a past medical history of  Asthma, Back pain, Flatback syndrome, GERD (gastroesophageal reflux disease), H/O systemic lupus erythematosus (SLE) (H), HLD (hyperlipidemia), HTN (hypertension), and Pseudoarthrosis of lumbar spine.     She  has a past surgical history that includes laparoscopic cholecystectomy (1985); laparoscopic appendectomy (1985); LAPAROTOMY UTERUS FIBROID TUMOR RESECTION/MYOMECTOMY (1985); Hysterectomy (1985); LAPAROSCOPY TUBAL LIGATION; Bunionectomy (Bilateral); Dental surgery; sinus surgery (1988); L2-5 TRANSFORAMINAL LUMBAR INTERBODY FUSION L5-S1 POSTERIOR LUMBAR INTERBODY FUSION (03/08/2016); L5-S1 pseudoarthrosis revision with BMP and iliac screws (11/07/2017); Optical tracking system fusion spine posterior lumbar three+ levels (N/A, 1/7/2020); and Optical tracking system fusion sacral iliac (N/A, 1/7/2020).      Family History  Her family history includes Cardiovascular in her mother and sister; Cerebrovascular Disease in her mother.     Social History:  She  reports that she has never smoked. She has never used smokeless tobacco. She reports current alcohol use. She reports that she does not use drugs.       Current Medications:   She has a current medication list which includes the following prescription(s): acetaminophen, atorvastatin, furosemide, gabapentin, gabapentin, advil pm, multivital, pantoprazole, valacyclovir, and levofloxacin.        Allergies:    -- Cefaclor -- Nausea and Vomiting and Rash   -- Naproxen -- Headache, Muscle Pain (Myalgia),                            Nausea and Vomiting and Hives    --  Tolerates ibuprofen.   -- Penicillins -- Other (See Comments) and Unknown    --  Childhood reaction. Patient unsure of reaction.   -- Atropine -- Other (See Comments)    --  Talwin atropine combination medication             Cardiac arrest   -- Pentazocine -- Other (See Comments)    --  Talwin atropine combination medication caused             cardiac arrest.   -- Seasonal Allergies -- Other (See  Comments)    --  Corn, springtime allergens causing sneezing.   -- Tuberculin Purified Protein Derivative -- Other (See Comments)    --  swelling   -- Erythromycin -- Rash    PHYSICAL EXAMINATION:  BP (!) 146/89   Pulse 74   Resp 16   Wt 100.7 kg (222 lb)   SpO2 97%   BMI 39.58 kg/m     General: Pleasant, straightforward, WDWN individual.  Mental Status: Pleasant, direct, appropriate mood and affect  Resp: breathing is unlabored without audible wheeze  Vascular: Palpable pedal pulses, no cyanosis, no venous stasis changes  Heme: no visible ecchymosis or erythema on extremities  Skin: No notable rash    Neurologic:  Strength: All major muscle groups of the bilateral lower extremities have normal and symmetric muscle strength EXCEPT hip flexor and abd 4 to 4+/5 with assoc pain limits    Musculoskeletal:  Hip Exam: Tender GMax and piriformis. Pain with piriformis stretch and resisted ROSHAN along hip ER. Tender along IT band on the right    ASSESSMENT:  Carmelita Mina is a very pleasant 75 year old female who presents with:    #. Bilateral Piriformis syndrome R>L (80:20%)  #. Piriformis muscle pain  #. Radicular pain of lumbosacral region  #. Spasm of muscle  #. IT band syndrome on the right   #. S/P L2-pelvis fusion with pedicle substraction osteotomy and bedrock fixation of the her bilateral SI joints. Last revision 01/2020 and doing significantly better.     PLAN:  - Sonographically guided right piriformis muscle corticosteroid injection performed today (see the detailed procedure note).  We reviewed the potential pathway and treatment options for piriformis syndrome from PT, corticosteroid injections, and possible botulinum toxin-A (Botox) in detail today.  - Reviewed additional piriformis stretches and self myofascial release either with a tennis ball, foam roller, or theracane.  She likely has some underlying IT band syndrome contributing on the right and additionally reviewed some HEP for this as well.  I would like her physical therapist to include/integrate some of these options as well as she is able to tolerate.    -She will carefully monitor her response to today's injection and we will plan to follow-up ~3 to 4 weeks to see how she has responded.  Should she have significant provement with today's procedure, we may pursue Botox injection for longer-term modulation of pain    Ready to learn, no apparent learning barriers.  Education provided on treatment plan according to patient's preferred learning style.  Patient verbalizes understanding.   __________________________________  Veronica Medina MD  Physical Medicine & Rehabilitation        69 minutes spent on the date of the encounter doing chart review, review of outside records, review of test results, patient visit, documentation and discussion with family and procedure.

## 2021-04-26 NOTE — LETTER
4/26/2021       RE: Carmelita Mina  [address] [place] [facility] [address]     Dear Colleague,    Thank you for referring your patient, Carmelita Mina, to the [facility] [place] at [facility]. Please see a copy of my visit note below.    Patient seen at the request of Dr. Segundo Samuels for an opinion and evaluation of piriformis syndrome.      HISTORY OF PRESENT ILLNESS:  Carmelita Mina is a 75 year old female who presents with a chief complaint of bilateral gluteal pain.  Patient is accompanied by her daughter today.    She has a complicated history of lumbosacral pain and multiple surgeries dating back to 2016 with initial preeti placed in L3 or L4-S1 with subsequent revision new hardware in 2017 which was performed by an outside surgeon.  Most recently she has been seen by my colleague in orthopedic spine surgery, Dr. 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  Following her last surgery, she states that her pain and symptoms are significantly improved overall including improved function.  She does continue to have bilateral gluteal/buttock pain.  She followed up with the orthopedic spine surgery clinic January 7, 2021 and felt to have bilateral piriformis syndrome and referred to our [facility] for further evaluation management.  At that time, she was also referred back to physical therapy for focused piriformis stretching and strengthening program.  She has been attending physical therapy with some interval improvement in her symptoms.  It seems that they have been performing manual therapies over the gluteal and piriformis muscles which is initially painful but does provide some  release and relief.  She continues some intermittent but not consistent home exercise program.    Currently, she localizes pain to the bilateral gluteal region which is the majority of her pain.  Pain is worse on the right compared to the left (80: 20%).  She also has some radiating symptoms to the lateral thigh to the level of the knee.  Pain score 5/10 today.  Symptoms are worse with generalized activity and prolonged positioning, particularly when seated -car rides are difficult for her.  Symptoms are improved with relative rest and position changes.  She takes ibuprofen and gabapentin for pain management.  She is otherwise relatively independent with ADLs and mobility but has significant limitations, particularly with mobility.      PRIOR INJURIES/TREATMENT:   Physical Therapy:   Multiple courses of prior PT. She has has current, ongoing PT closer to home for Piriformis program.       - Current Pain Medications -   Ibuprofen   Gabapentin     Prior Procedures:  Multiple prior lumbar interventional pain procedures prior to surgery         Prior Related Surgery per Orthopedic spine surgery documentation:  1/7/2020 - Posterior spinal fusion L-2.   Pedicle subtraction osteotomy L4.  Lorenzo-Flowers osteotomy L1-2. Transforaminal lumbar interbody fusion L1-L2. Insertion structural intervertebral device L1-2.  Spinal instrumentation L1. Reinsertion spinal instrumentation L2-S1. Pelvic fixation Image-guided surgery. Bilateral sacroiliac joint fusion     11/2017 - L4 or L5 Alonzo revision and new hardware  03/2016 - Rods place in L3 or L4 to S1     Specialists Seen - (with most recent, available notes and clinic visits reviewed)   1. Orthopedic spine surgery - Dr. Segundo Samuels     IMAGING - reviewed   01/07/2021 XR Spine and Pelvis  Impression:  1. Redemonstration of fusion instrumentation extending from L1 through  bilateral SI joints. No definite evidence of hardware failure.  2. No substantial degenerative change.  3. No  global sagittal imbalance     Review Of Systems:   I am responding to those symptoms which are directly relevant to the specific indication for my consultation. I recommend that the patient follow up with their primary or referring provider to pursue any other symptoms which may be of concern.    Medical History:  She  has a past medical history of Asthma, Back pain, Flatback syndrome, GERD (gastroesophageal reflux disease), H/O systemic lupus erythematosus (SLE) (H), HLD (hyperlipidemia), HTN (hypertension), and Pseudoarthrosis of lumbar spine.     She  has a past surgical history that includes laparoscopic cholecystectomy (1985); laparoscopic appendectomy (1985); LAPAROTOMY UTERUS FIBROID TUMOR RESECTION/MYOMECTOMY (1985); Hysterectomy (1985); LAPAROSCOPY TUBAL LIGATION; Bunionectomy (Bilateral); Dental surgery; sinus surgery (1988); L2-5 TRANSFORAMINAL LUMBAR INTERBODY FUSION L5-S1 POSTERIOR LUMBAR INTERBODY FUSION (03/08/2016); L5-S1 pseudoarthrosis revision with BMP and iliac screws (11/07/2017); Optical tracking system fusion spine posterior lumbar three+ levels (N/A, 1/7/2020); and Optical tracking system fusion sacral iliac (N/A, 1/7/2020).      Family History  Her family history includes Cardiovascular in her mother and sister; Cerebrovascular Disease in her mother.     Social History:  She  reports that she has never smoked. She has never used smokeless tobacco. She reports current alcohol use. She reports that she does not use drugs.       Current Medications:   She has a current medication list which includes the following prescription(s): acetaminophen, atorvastatin, furosemide, gabapentin, gabapentin, advil pm, multivital, pantoprazole, valacyclovir, and levofloxacin.        Allergies:    -- Cefaclor -- Nausea and Vomiting and Rash   -- Naproxen -- Headache, Muscle Pain (Myalgia),                            Nausea and Vomiting and Hives    --  Tolerates ibuprofen.   -- Penicillins -- Other (See  Comments) and Unknown    --  Childhood reaction. Patient unsure of reaction.   -- Atropine -- Other (See Comments)    --  Talwin atropine combination medication             Cardiac arrest   -- Pentazocine -- Other (See Comments)    --  Talwin atropine combination medication caused             cardiac arrest.   -- Seasonal Allergies -- Other (See Comments)    --  Corn, springtime allergens causing sneezing.   -- Tuberculin Purified Protein Derivative -- Other (See Comments)    --  swelling   -- Erythromycin -- Rash    PHYSICAL EXAMINATION:  BP (!) 146/89   Pulse 74   Resp 16   Wt 100.7 kg (222 lb)   SpO2 97%   BMI 39.58 kg/m     General: Pleasant, straightforward, WDWN individual.  Mental Status: Pleasant, direct, appropriate mood and affect  Resp: breathing is unlabored without audible wheeze  Vascular: Palpable pedal pulses, no cyanosis, no venous stasis changes  Heme: no visible ecchymosis or erythema on extremities  Skin: No notable rash    Neurologic:  Strength: All major muscle groups of the bilateral lower extremities have normal and symmetric muscle strength EXCEPT hip flexor and abd 4 to 4+/5 with assoc pain limits    Musculoskeletal:  Hip Exam: Tender GMax and piriformis. Pain with piriformis stretch and resisted ROSHAN along hip ER. Tender along IT band on the right    ASSESSMENT:  Carmelita Mina is a very pleasant 75 year old female who presents with:    #. Bilateral Piriformis syndrome R>L (80:20%)  #. Piriformis muscle pain  #. Radicular pain of lumbosacral region  #. Spasm of muscle  #. IT band syndrome on the right   #. S/P L2-pelvis fusion with pedicle substraction osteotomy and bedrock fixation of the her bilateral SI joints. Last revision 01/2020 and doing significantly better.     PLAN:  - Sonographically guided right piriformis muscle corticosteroid injection performed today (see the detailed procedure note).  We reviewed the potential pathway and treatment options for piriformis  syndrome from PT, corticosteroid injections, and possible botulinum toxin-A (Botox) in detail today.  - Reviewed additional piriformis stretches and self myofascial release either with a tennis ball, foam roller, or theracane.  She likely has some underlying IT band syndrome contributing on the right and additionally reviewed some HEP for this as well. I would like her physical therapist to include/integrate some of these options as well as she is able to tolerate.    -She will carefully monitor her response to today's injection and we will plan to follow-up ~3 to 4 weeks to see how she has responded.  Should she have significant provement with today's procedure, we may pursue Botox injection for longer-term modulation of pain    Ready to learn, no apparent learning barriers.  Education provided on treatment plan according to patient's preferred learning style.  Patient verbalizes understanding.   __________________________________  Veronica Medina MD  Physical Medicine & Rehabilitation        69 minutes spent on the date of the encounter doing chart review, review of outside records, review of test results, patient visit, documentation and discussion with family and procedure.

## 2021-05-20 ENCOUNTER — VIRTUAL VISIT (OUTPATIENT)
Dept: PHYSICAL MEDICINE AND REHAB | Facility: CLINIC | Age: 76
End: 2021-05-20
Payer: MEDICARE

## 2021-05-20 DIAGNOSIS — M43.28 FUSION OF SACRAL REGION OF SPINE: ICD-10-CM

## 2021-05-20 DIAGNOSIS — Z98.1 S/P SPINAL FUSION: ICD-10-CM

## 2021-05-20 DIAGNOSIS — M62.838 SPASM OF MUSCLE: ICD-10-CM

## 2021-05-20 DIAGNOSIS — G57.03 PIRIFORMIS SYNDROME OF BOTH SIDES: Primary | ICD-10-CM

## 2021-05-20 DIAGNOSIS — M79.18 PIRIFORMIS MUSCLE PAIN: ICD-10-CM

## 2021-05-20 PROCEDURE — 99215 OFFICE O/P EST HI 40 MIN: CPT | Mod: 95 | Performed by: PHYSICAL MEDICINE & REHABILITATION

## 2021-05-20 NOTE — PROGRESS NOTES
PM&R Follow-Up Visit -     Date of Initial Visit: 04/26/2021  LOV: 04/26/2021  TD: 5/20/2021     Recall: Carmelita Mina is a 75 year old female with history of L2-pelvis fusion with pedicle substraction osteotomy and bedrock fixation of the her bilateral SI joints (last revision 01/2020) who follows up for right>left (80:20%) lumbar radicular pain, piriformis syndrome    INTERVAL HISTORY:  Patient was last seen in clinic April 26, 2021. At that visit, she underwent US-guided Right piriformis muscle corticosteroid injection 75-80% ongoing improvement in her pain and symptoms. She is able to sit much longer (eg in [employer], car rides); can also position and sleep more comfortably.     Completed physical therapy since our last visit and continues home exercises. She continues to have pain in a band like distribution across the buttock/gluteal area which was not changed for affected by the injection, but feels it is manageable at this time.     Patient seen at the request of Dr. Segundo Samuels for an opinion and evaluation of piriformis syndrome.      PRIOR INJURIES/TREATMENT:   Physical Therapy:   Multiple courses of prior PT.   Completed PT in Spring 2021 closer to home for Piriformis program.       - Current Pain Medications -   Ibuprofen   Gabapentin     Prior Procedures:  Multiple prior lumbar interventional pain procedures prior to surgery     Date    Procedure   Improvement (%)  04/26/21 R Piriformis (US) 75-80% ongoing   Prior Related Surgery per Orthopedic spine surgery documentation:  1/7/2020 - Posterior spinal fusion L-2.   Pedicle subtraction osteotomy L4.  Lorenzo-Flowers osteotomy L1-2. Transforaminal lumbar interbody fusion L1-L2. Insertion structural intervertebral device L1-2.  Spinal instrumentation L1. Reinsertion spinal instrumentation L2-S1. Pelvic fixation Image-guided surgery. Bilateral sacroiliac joint fusion     11/2017 - L4 or L5 Alonzo revision and new hardware  03/2016 - Rods place in L3  or L4 to S1     Specialists Seen - (with most recent, available notes and clinic visits reviewed)   1. Orthopedic spine surgery - Dr. Segundo Samuels     IMAGING - reviewed   01/07/2021 XR Spine and Pelvis  Impression:  1. Redemonstration of fusion instrumentation extending from L1 through  bilateral SI joints. No definite evidence of hardware failure.  2. No substantial degenerative change.  3. No global sagittal imbalance     Medical History:  She  has a past medical history of Asthma, Back pain, Flatback syndrome, GERD (gastroesophageal reflux disease), H/O systemic lupus erythematosus (SLE) (H), HLD (hyperlipidemia), HTN (hypertension), and Pseudoarthrosis of lumbar spine.     She  has a past surgical history that includes laparoscopic cholecystectomy (1985); laparoscopic appendectomy (1985); LAPAROTOMY UTERUS FIBROID TUMOR RESECTION/MYOMECTOMY (1985); Hysterectomy (1985); LAPAROSCOPY TUBAL LIGATION; Bunionectomy (Bilateral); Dental surgery; sinus surgery (1988); L2-5 TRANSFORAMINAL LUMBAR INTERBODY FUSION L5-S1 POSTERIOR LUMBAR INTERBODY FUSION (03/08/2016); L5-S1 pseudoarthrosis revision with BMP and iliac screws (11/07/2017); Optical tracking system fusion spine posterior lumbar three+ levels (N/A, 1/7/2020); and Optical tracking system fusion sacral iliac (N/A, 1/7/2020).      Family History  Her family history includes Cardiovascular in her mother and sister; Cerebrovascular Disease in her mother.     Social History:  She  reports that she has never smoked. She has never used smokeless tobacco. She reports current alcohol use. She reports that she does not use drugs.       Current Medications:   She has a current medication list which includes the following prescription(s): acetaminophen, atorvastatin, furosemide, gabapentin, gabapentin, advil pm, levofloxacin, multivital, pantoprazole, and valacyclovir.        Allergies:    -- Cefaclor -- Nausea and Vomiting and Rash   -- Naproxen -- Headache, Muscle Pain  (Myalgia),                            Nausea and Vomiting and Hives    --  Tolerates ibuprofen.   -- Penicillins -- Other (See Comments) and Unknown    --  Childhood reaction. Patient unsure of reaction.   -- Atropine -- Other (See Comments)    --  Talwin atropine combination medication             Cardiac arrest   -- Pentazocine -- Other (See Comments)    --  Talwin atropine combination medication caused             cardiac arrest.   -- Seasonal Allergies -- Other (See Comments)    --  Corn, springtime allergens causing sneezing.   -- Tuberculin Purified Protein Derivative -- Other (See Comments)    --  swelling   -- Erythromycin -- Rash    PHYSICAL EXAMINATION:  VS: N/A   General: Pleasant, straightforward, WDWN individual.  Mental Status: Pleasant, direct, appropriate mood and affect  Resp: breathing is unlabored   Vascular: No visible cyanosis, venous stasis changes  Heme: no visible ecchymosis or erythema on extremities  Skin: No notable rash    Neurologic:  Strength: Moving all major muscle groups anti-gravity.       ASSESSMENT:  Carmelita Mina is a very pleasant 75 year old female who presents with:    #. Bilateral Piriformis syndrome R>L (80:20%). 75-80% improvement in symptoms following R-sided piriformis muscle corticosteroid inj (04/26/21)   #. Piriformis muscle pain  #. Radicular pain of lumbosacral region  #. Spasm of muscle  #. IT band syndrome on the right   #. S/P L2-pelvis fusion with pedicle substraction osteotomy and bedrock fixation of the her bilateral SI joints. Last revision 01/2020 and doing significantly better.     PLAN:  - Continue HEP. Stressed the importance of home exercise program when it comes to achieving meaningful, long-lasting pain relief    - Consider repeat US-guided piriformis CSI v botulinum toxin-A (Botox) depending on duration of benefit   - She could consider prolotherapy for SI joint ligaments for a possible treatment option of her band-like distribution of pain and  symptoms. Discussed that I do not perform this but could assist in finding someone that may. Understands that it would be out-of-pocket. Pain is otherwise manageable and will defer at this time.   - Follow up as needed. She knows to contact me depending on duration of benefit and I will be happy to review next steps.     Ready to learn, no apparent learning barriers.  Education provided on treatment plan according to patient's preferred learning style.  Patient verbalizes understanding.   __________________________________  Veronica Medina MD  Physical Medicine & Rehabilitation      40 minutes spent on the date of the encounter doing chart review, patient visit and documentation

## 2021-05-20 NOTE — LETTER
5/20/2021       RE: Carmelita Mina  [address] [facility] [address]     Dear Colleague,    Thank you for referring your patient, Carmelita Mina, to the [facility] [place] at [facility]. Please see a copy of my visit note below.    Carmelita is a 75 year old who is being evaluated via a billable video visit.      How would you like to obtain your AVS? mychart  If the video visit is dropped, the invitation should be resent by: please send link to [email or web address]  Will anyone else be joining your video visit? no      Video-Visit Details    Type of service:  Video Visit    Start: 05/20/2021 03:59 pm  Stop: 05/20/2021 04:26 pm    Originating Location (pt. Location): Home    Distant Location (provider location):  [facility]     Platform used for Video Visit: PEARL    PM&R Follow-Up Visit -     Date of Initial Visit: 04/26/2021  LOV: 04/26/2021  TD: 5/20/2021     Recall: Carmelita Mina is a 75 year old female with history of L2-pelvis fusion with pedicle substraction osteotomy and bedrock fixation of the her bilateral SI joints (last revision 01/2020) who follows up for right>left (80:20%) lumbar radicular pain, piriformis syndrome    INTERVAL HISTORY:  Patient was last seen in clinic April 26, 2021. At that visit, she underwent US-guided Right piriformis muscle corticosteroid injection 75-80% ongoing improvement in her pain and symptoms. She is able to sit much longer (eg in [employer], car rides); can also position and sleep more comfortably.     Completed physical therapy since our last visit and continues home exercises. She continues to have pain in a band like distribution across the buttock/gluteal area which was not changed for affected by the injection, but feels it is manageable at this time.     Patient seen at the request of   Segundo Samuels for an opinion and evaluation of piriformis syndrome.      PRIOR INJURIES/TREATMENT:   Physical Therapy:   Multiple courses of prior PT.   Completed PT in Spring 2021 closer to home for Piriformis program.       - Current Pain Medications -   Ibuprofen   Gabapentin     Prior Procedures:  Multiple prior lumbar interventional pain procedures prior to surgery     Date    Procedure   Improvement (%)  04/26/21 R Piriformis (US) 75-80% ongoing   Prior Related Surgery per Orthopedic spine surgery documentation:  1/7/2020 - Posterior spinal fusion L-2.   Pedicle subtraction osteotomy L4.  Lorenzo-Flowers osteotomy L1-2. Transforaminal lumbar interbody fusion L1-L2. Insertion structural intervertebral device L1-2.  Spinal instrumentation L1. Reinsertion spinal instrumentation L2-S1. Pelvic fixation Image-guided surgery. Bilateral sacroiliac joint fusion     11/2017 - L4 or L5 Alonzo revision and new hardware  03/2016 - Rods place in L3 or L4 to S1     Specialists Seen - (with most recent, available notes and clinic visits reviewed)   1. Orthopedic spine surgery - Dr. Segundo Samuels     IMAGING - reviewed   01/07/2021 XR Spine and Pelvis  Impression:  1. Redemonstration of fusion instrumentation extending from L1 through  bilateral SI joints. No definite evidence of hardware failure.  2. No substantial degenerative change.  3. No global sagittal imbalance     Medical History:  She  has a past medical history of Asthma, Back pain, Flatback syndrome, GERD (gastroesophageal reflux disease), H/O systemic lupus erythematosus (SLE) (H), HLD (hyperlipidemia), HTN (hypertension), and Pseudoarthrosis of lumbar spine.     She  has a past surgical history that includes laparoscopic cholecystectomy (1985); laparoscopic appendectomy (1985); LAPAROTOMY UTERUS FIBROID TUMOR RESECTION/MYOMECTOMY (1985); Hysterectomy (1985); LAPAROSCOPY TUBAL LIGATION; Bunionectomy (Bilateral); Dental surgery; sinus surgery (1988); L2-5  TRANSFORAMINAL LUMBAR INTERBODY FUSION L5-S1 POSTERIOR LUMBAR INTERBODY FUSION (03/08/2016); L5-S1 pseudoarthrosis revision with BMP and iliac screws (11/07/2017); Optical tracking system fusion spine posterior lumbar three+ levels (N/A, 1/7/2020); and Optical tracking system fusion sacral iliac (N/A, 1/7/2020).      Family History  Her family history includes Cardiovascular in her mother and sister; Cerebrovascular Disease in her mother.     Social History:  She  reports that she has never smoked. She has never used smokeless tobacco. She reports current alcohol use. She reports that she does not use drugs.       Current Medications:   She has a current medication list which includes the following prescription(s): acetaminophen, atorvastatin, furosemide, gabapentin, gabapentin, advil pm, levofloxacin, multivital, pantoprazole, and valacyclovir.        Allergies:    -- Cefaclor -- Nausea and Vomiting and Rash   -- Naproxen -- Headache, Muscle Pain (Myalgia),                            Nausea and Vomiting and Hives    --  Tolerates ibuprofen.   -- Penicillins -- Other (See Comments) and Unknown    --  Childhood reaction. Patient unsure of reaction.   -- Atropine -- Other (See Comments)    --  Talwin atropine combination medication             Cardiac arrest   -- Pentazocine -- Other (See Comments)    --  Talwin atropine combination medication caused             cardiac arrest.   -- Seasonal Allergies -- Other (See Comments)    --  Corn, springtime allergens causing sneezing.   -- Tuberculin Purified Protein Derivative -- Other (See Comments)    --  swelling   -- Erythromycin -- Rash    PHYSICAL EXAMINATION:  VS: N/A   General: Pleasant, straightforward, WDWN individual.  Mental Status: Pleasant, direct, appropriate mood and affect  Resp: breathing is unlabored   Vascular: No visible cyanosis, venous stasis changes  Heme: no visible ecchymosis or erythema on extremities  Skin: No notable rash    Neurologic:  Strength:  Moving all major muscle groups anti-gravity.       ASSESSMENT:  Carmelita Mina is a very pleasant 75 year old female who presents with:    #. Bilateral Piriformis syndrome R>L (80:20%). 75-80% improvement in symptoms following R-sided piriformis muscle corticosteroid inj (04/26/21)   #. Piriformis muscle pain  #. Radicular pain of lumbosacral region  #. Spasm of muscle  #. IT band syndrome on the right   #. S/P L2-pelvis fusion with pedicle substraction osteotomy and bedrock fixation of the her bilateral SI joints. Last revision 01/2020 and doing significantly better.     PLAN:  - Continue HEP. Stressed the importance of home exercise program when it comes to achieving meaningful, long-lasting pain relief    - Consider repeat US-guided piriformis CSI v botulinum toxin-A (Botox) depending on duration of benefit   - She could consider prolotherapy for SI joint ligaments for a possible treatment option of her band-like distribution of pain and symptoms. Discussed that I do not perform this but could assist in finding someone that may. Understands that it would be out-of-pocket. Pain is otherwise manageable and will defer at this time.   - Follow up as needed. She knows to contact me depending on duration of benefit and I will be happy to review next steps.     Ready to learn, no apparent learning barriers.  Education provided on treatment plan according to patient's preferred learning style.  Patient verbalizes understanding.   __________________________________  Veronica Medina MD  Physical Medicine & Rehabilitation      40 minutes spent on the date of the encounter doing chart review, patient visit and documentation

## 2021-05-20 NOTE — PROGRESS NOTES
Carmelita is a 75 year old who is being evaluated via a billable video visit.      How would you like to obtain your AVS? mychart  If the video visit is dropped, the invitation should be resent by: please send link to [email or web address]  Will anyone else be joining your video visit? no      Video-Visit Details    Type of service:  Video Visit    Start: 05/20/2021 03:59 pm  Stop: 05/20/2021 04:26 pm    Originating Location (pt. Location): Home    Distant Location (provider location):  [facility] [place]     Platform used for Video Visit: [email or web address]

## 2021-06-09 ENCOUNTER — TELEPHONE (OUTPATIENT)
Dept: PHYSICAL MEDICINE AND REHAB | Facility: CLINIC | Age: 76
End: 2021-06-09

## 2021-06-09 DIAGNOSIS — M62.838 SPASM OF MUSCLE: Primary | ICD-10-CM

## 2021-06-09 DIAGNOSIS — G57.03 BILATERAL PIRIFORMIS SYNDROME: ICD-10-CM

## 2021-06-09 DIAGNOSIS — M62.838 OTHER MUSCLE SPASM: ICD-10-CM

## 2021-06-09 DIAGNOSIS — M79.18 PIRIFORMIS MUSCLE PAIN: ICD-10-CM

## 2021-06-09 NOTE — TELEPHONE ENCOUNTER
[facility] Call Center    Phone Message    May a detailed message be left on voicemail: no     Reason for Call: Other: Carmelita calling to request a call back due to her cortisone injection she had on 4/26/21 is wearing of and she is wondering if there was a possibility of scheduling an injection appointment for 6/17/21 on both sides. Please call Carmelita at your earliest convenience to discuss.     Action Taken: Message routed to:  [facility]    Travel Screening: Not Applicable

## 2021-06-10 NOTE — TELEPHONE ENCOUNTER
Would need to be 3 months for repeat steroid injection. We can schedule x3 months for repeat. We can also submit to see if botox might be approved. Please see what patient would like to do. Thanks.     Spoke with patient and she would like to pursue Botox for her piriforms syndrome bilaterally. Orders pended; please sign if appropriate.         Estrella Olsen RN, BSN, PHN

## 2021-08-05 ENCOUNTER — TELEPHONE (OUTPATIENT)
Dept: PHYSICAL MEDICINE AND REHAB | Facility: CLINIC | Age: 76
End: 2021-08-05

## 2021-08-09 NOTE — TELEPHONE ENCOUNTER
[facility] Call Center    Phone Message    May a detailed message be left on voicemail: yes     Reason for Call: Patient would like to schedule her shot with Dr. Mo.  Patient would like to speak with someone in regards to getting the cortisone injection and botox done on the same day.    Please reach out to patient.    Action Taken: Message routed to:  [facility] ([facility] Neuro    Travel Screening: Not Applicable                                                                      
Spoke with the patient and discussed Botox replaces the corticosteroid injection. Appt scheduled. Will close encounter.   
Psych/Behavioral

## 2021-08-15 ENCOUNTER — HEALTH MAINTENANCE LETTER (OUTPATIENT)
Age: 76
End: 2021-08-15

## 2021-08-19 ENCOUNTER — OFFICE VISIT (OUTPATIENT)
Dept: PHYSICAL MEDICINE AND REHAB | Facility: CLINIC | Age: 76
End: 2021-08-19
Payer: MEDICARE

## 2021-08-19 VITALS
SYSTOLIC BLOOD PRESSURE: 128 MMHG | TEMPERATURE: 97.9 F | OXYGEN SATURATION: 98 % | HEART RATE: 68 BPM | RESPIRATION RATE: 16 BRPM | BODY MASS INDEX: 41.22 KG/M2 | HEIGHT: 62 IN | DIASTOLIC BLOOD PRESSURE: 85 MMHG | WEIGHT: 224 LBS

## 2021-08-19 DIAGNOSIS — G57.03 PIRIFORMIS SYNDROME OF BOTH SIDES: ICD-10-CM

## 2021-08-19 DIAGNOSIS — G57.01 PIRIFORMIS SYNDROME OF RIGHT SIDE: ICD-10-CM

## 2021-08-19 DIAGNOSIS — M62.838 OTHER MUSCLE SPASM: Primary | ICD-10-CM

## 2021-08-19 PROBLEM — E66.01 MORBID OBESITY (H): Status: ACTIVE | Noted: 2021-08-19

## 2021-08-19 PROCEDURE — 64642 CHEMODENERV 1 EXTREMITY 1-4: CPT | Performed by: PHYSICAL MEDICINE & REHABILITATION

## 2021-08-19 ASSESSMENT — MIFFLIN-ST. JEOR: SCORE: 1464.31

## 2021-08-19 ASSESSMENT — PAIN SCALES - GENERAL: PAINLEVEL: MODERATE PAIN (4)

## 2021-08-19 NOTE — PROGRESS NOTES
PROCEDURE NOTE: Piriformis Muscle Botulinum Toxin-A (Botox) Injection Under Ultrasound Guidance    PROCEDURE DATE: 8/19/2021    PATIENT NAME: Carmelita Mina  YOB: 1945    ATTENDING PHYSICIAN: Veronica Medina MD  FELLOW/RESIDENT PHYSICIAN: None     PREOPERATIVE DIAGNOSIS:   1. Other muscle spasm     2. Piriformis syndrome of both sides    POSTOPERATIVE DIAGNOSIS: same    PROCEDURE PERFORMED: Right Piriformis Muscle Botox Injection Under Ultrasound Guidance    ULTRASOUND WAS USED.    Botox - Lot No.: [id number] , Exp: 11/2023     INDICATIONS FOR THE PROCEDURE:  Carmelita Mina is a 75 year old female who presents with piriformis syndrome and muscle spasm.     PROCEDURE AND FINDINGS:  She was greeted in the clinic. The risk, benefits and alternatives to the procedure were again reviewed with her and informed consent was obtained and the patient agreed to proceed. A time-out was performed. Following review alternatives, benefits and risks, the procedure was carried out under sterile prep with sterile gel. The use of direct sonographic guidance was used to ensure accurate placement of the needle (rather than non-guided injection) and required to minimize the risk of bleeding or injury to nearby neurovascular structures.     A 5-1MHz ultrasound transducer was used to visualize the relevant structures and determine the optimal needle path for the procedure.  2mL 1% lidocaine was infiltrated at the needle insertion site subcutaneously. Then, a 22 gauge 5-inch Quincke spinal needle was advanced from lateral to medial utilizing an in-plane approach in long axis, under continuous ultrasound guidance to the piriformis muscle on the right. After negative aspiration, slow injection of the treatment solution 2mL total consisting of 100 units Botox reconstituted in 2mL of preservative free normal salinewas instilled into affected area (x2 locations). The tip of the needle was visualized throughout the  procedure. The remainder of the single-use vials were discarded.      She tolerated the procedure well, was discharged home in stable condition.     Follow-up will be in clinic x3 months for repeat injection.     COMPLICATIONS: None    COMMENTS: None

## 2021-08-19 NOTE — NURSING NOTE
Chief Complaint   Patient presents with     Botox     [facility] Return Botox - US Guided Piriformis Botox injection     Cameron Holloway

## 2021-10-10 ENCOUNTER — HEALTH MAINTENANCE LETTER (OUTPATIENT)
Age: 76
End: 2021-10-10

## 2021-11-18 ENCOUNTER — OFFICE VISIT (OUTPATIENT)
Dept: PHYSICAL MEDICINE AND REHAB | Facility: CLINIC | Age: 76
End: 2021-11-18
Payer: MEDICARE

## 2021-11-18 VITALS
SYSTOLIC BLOOD PRESSURE: 130 MMHG | DIASTOLIC BLOOD PRESSURE: 78 MMHG | RESPIRATION RATE: 16 BRPM | OXYGEN SATURATION: 98 % | HEART RATE: 63 BPM | TEMPERATURE: 97.4 F

## 2021-11-18 DIAGNOSIS — M79.18 PIRIFORMIS MUSCLE PAIN: ICD-10-CM

## 2021-11-18 DIAGNOSIS — M62.838 SPASM OF MUSCLE: Primary | ICD-10-CM

## 2021-11-18 DIAGNOSIS — M43.28 FUSION OF SACRAL REGION OF SPINE: ICD-10-CM

## 2021-11-18 DIAGNOSIS — M62.838 MUSCLE SPASM: ICD-10-CM

## 2021-11-18 DIAGNOSIS — Z98.1 S/P SPINAL FUSION: ICD-10-CM

## 2021-11-18 DIAGNOSIS — G57.01 PIRIFORMIS SYNDROME OF RIGHT SIDE: ICD-10-CM

## 2021-11-18 PROCEDURE — 99213 OFFICE O/P EST LOW 20 MIN: CPT | Mod: 25 | Performed by: PHYSICAL MEDICINE & REHABILITATION

## 2021-11-18 PROCEDURE — 96372 THER/PROPH/DIAG INJ SC/IM: CPT | Performed by: PHYSICAL MEDICINE & REHABILITATION

## 2021-11-18 PROCEDURE — 64642 CHEMODENERV 1 EXTREMITY 1-4: CPT | Mod: 59 | Performed by: PHYSICAL MEDICINE & REHABILITATION

## 2021-11-18 PROCEDURE — 76942 ECHO GUIDE FOR BIOPSY: CPT | Performed by: PHYSICAL MEDICINE & REHABILITATION

## 2021-11-18 ASSESSMENT — PAIN SCALES - GENERAL: PAINLEVEL: MODERATE PAIN (4)

## 2021-11-18 NOTE — PROGRESS NOTES
PM&R Follow-Up Visit -     Date of Initial Visit: 04/26/2021  LOV: 08/19/2021  TD: 11/18/2021     Recall: Carmelita Mina is a 76 year old female with history of L2-pelvis fusion with pedicle substraction osteotomy and bedrock fixation of the her bilateral SI joints (last revision 01/2020) who follows up for right>left (80:20%) lumbar radicular pain, piriformis syndrome and repeat Botox injection    INTERVAL HISTORY:  Patient was last seen in clinic Aug 19, 2021. At that visit she underwent US-guided right piriformis muscle Botox injection. She denies adverse effects from the procedure. In fact, she was quite pleased with her response noting 100% improvement until 2-3 weeks ago and now approximately 75%. She was please that she could tolerate sitting much longer and able to sit in/attend [employer] and go for longer car rides.     PRIOR INJURIES/TREATMENT:   Physical Therapy:   Multiple courses of prior PT.   Completed PT in Spring 2021 closer to home for Piriformis program.        - Current Pain Medications -   Ibuprofen   Gabapentin      Prior Procedures:  Multiple prior lumbar interventional pain procedures prior to surgery                                     Date                 Procedure                    Improvement (%)  04/26/21          R Piriformis CSI (US)   75-80% ~6weeks  08/19/21 R Piriformis Botox (US) 100% x2.5mos then 75%     Prior Related Surgery per Orthopedic spine surgery documentation:  1/7/2020 - Posterior spinal fusion L-2.   Pedicle subtraction osteotomy L4.  Lorenzo-Flowers osteotomy L1-2. Transforaminal lumbar interbody fusion L1-L2. Insertion structural intervertebral device L1-2.  Spinal instrumentation L1. Reinsertion spinal instrumentation L2-S1. Pelvic fixation Image-guided surgery. Bilateral sacroiliac joint fusion     11/2017 - L4 or L5 Alonzo revision and new hardware  03/2016 - Rods place in L3 or L4 to S1     Specialists Seen - (with most recent, available notes and clinic visits  reviewed)   1. Orthopedic spine surgery - Dr. Segundo Samuels     She  has a past medical history of Asthma, Back pain, Flatback syndrome, GERD (gastroesophageal reflux disease), H/O systemic lupus erythematosus (SLE) (H), HLD (hyperlipidemia), HTN (hypertension), and Pseudoarthrosis of lumbar spine.     She  has a past surgical history that includes laparoscopic cholecystectomy (1985); laparoscopic appendectomy (1985); LAPAROTOMY UTERUS FIBROID TUMOR RESECTION/MYOMECTOMY (1985); Hysterectomy (1985); LAPAROSCOPY TUBAL LIGATION; Bunionectomy (Bilateral); Dental surgery; sinus surgery (1988); L2-5 TRANSFORAMINAL LUMBAR INTERBODY FUSION L5-S1 POSTERIOR LUMBAR INTERBODY FUSION (03/08/2016); L5-S1 pseudoarthrosis revision with BMP and iliac screws (11/07/2017); Optical tracking system fusion spine posterior lumbar three+ levels (N/A, 1/7/2020); and Optical tracking system fusion sacral iliac (N/A, 1/7/2020).      Family History  Her family history includes Cardiovascular in her mother and sister; Cerebrovascular Disease in her mother.      Social History:  She  reports that she has never smoked. She has never used smokeless tobacco. She reports current alcohol use. She reports that she does not use drugs.     She has a current medication list which includes the following prescription(s): acetaminophen, atorvastatin, furosemide, gabapentin, gabapentin, advil pm, levofloxacin, multivital, pantoprazole, and valacyclovir, and the following Facility-Administered Medications: botulinum toxin type a.      /78   Pulse 63   Temp 97.4  F (36.3  C)   Resp 16   SpO2 98%    General: Pleasant, straightforward, WDWN individual.  Mental Status: Pleasant, direct, appropriate mood and affect  Resp: breathing is unlabored without audible wheeze  Vascular: Palpable pedal pulses, no cyanosis, no venous stasis changes  Heme: no visible ecchymosis or erythema on extremities  Skin: No notable rash     Musculoskeletal:  Tender over right  GMax and piriformis    ASSESSMENT:   Carmelita Mina is a very pleasant 76 year old female who follows up for:    #. Bilateral Piriformis syndrome R>L (80:20%). 75-80% improvement in symptoms following R-sided piriformis muscle corticosteroid inj (04/26/21) and % improved after Botox inj  #. Piriformis muscle pain  #. Radicular pain of lumbosacral region  #. Spasm of muscle  #. IT band syndrome on the right   #. S/P L2-pelvis fusion with pedicle substraction osteotomy and bedrock fixation of the her bilateral SI joints. Last revision 01/2020 and doing significantly better.     PLAN:   - Repeat sonographically-guided right piriformis Botox injection   - RTC x3 months for repeat inj. Will attempt to coordinate with 2-yr post-op ortho spine follow-up.

## 2021-11-18 NOTE — PROCEDURES
PROCEDURE NOTE: Piriformis Muscle Botulinum Toxin-A (Botox) Injection Under Ultrasound Guidance    PROCEDURE DATE: 11/18/2021     PATIENT NAME: Carmelita Mina  YOB: 1945    ATTENDING PHYSICIAN: Veronica Medina MD  FELLOW/RESIDENT PHYSICIAN: None     PREOPERATIVE DIAGNOSIS:   1. Spasm of muscle    2. Piriformis syndrome of right side    3. Piriformis muscle pain    4. Muscle spasm    5. S/P spinal fusion    6. Fusion of sacral region of spine    POSTOPERATIVE DIAGNOSIS: same    PROCEDURE PERFORMED: Right Piriformis Muscle Botox Injection Under Ultrasound Guidance    ULTRASOUND WAS USED.    INDICATIONS FOR THE PROCEDURE:  Carmelita Mina is a 76 year old female who presents with piriformis syndrome and muscle spasm.     PROCEDURE AND FINDINGS:  She was greeted in the clinic. The risk, benefits and alternatives to the procedure were again reviewed with her and informed consent was obtained and the patient agreed to proceed. A time-out was performed. Following review alternatives, benefits and risks, the procedure was carried out under sterile prep with sterile gel. The use of direct sonographic guidance was used to ensure accurate placement of the needle (rather than non-guided injection) and required to minimize the risk of bleeding or injury to nearby neurovascular structures.     A 5-1MHz ultrasound transducer was used to visualize the relevant structures and determine the optimal needle path for the procedure.  2mL 1% lidocaine was infiltrated at the needle insertion site subcutaneously. Then, a 22 gauge 5-inch Quincke spinal needle was advanced from lateral to medial utilizing an in-plane approach in long axis, under continuous ultrasound guidance to the piriformis muscle on the right. After negative aspiration, slow injection of the treatment solution 2mL total consisting of 100 units Botox reconstituted in 2mL of preservative free normal saline was instilled into affected area (x2  locations). The tip of the needle was visualized throughout the procedure. The remainder of the single-use vials were discarded.      She tolerated the procedure well, was discharged home in stable condition.     Follow-up will be in clinic x3 months for repeat injection.     COMPLICATIONS: None    COMMENTS: None

## 2021-11-18 NOTE — NURSING NOTE
Chief Complaint   Patient presents with     Botox     [facility] Return Botox - US Guided Piriformis Botox injections     Cameron Holloway

## 2021-11-18 NOTE — LETTER
11/18/2021       RE: Carmelita Mina  [address] [facility] [address]     Dear Colleague,    Thank you for referring your patient, Carmelita Mina, to the [facility] PHYSICAL MEDICINE AND REHABILITATION CLINIC [place] at [facility]. Please see a copy of my visit note below.    PM&R Follow-Up Visit -     Date of Initial Visit: 04/26/2021  LOV: 08/19/2021  TD: 11/18/2021     Recall: Carmelita Mina is a 76 year old female with history of L2-pelvis fusion with pedicle substraction osteotomy and bedrock fixation of the her bilateral SI joints (last revision 01/2020) who follows up for right>left (80:20%) lumbar radicular pain, piriformis syndrome and repeat Botox injection    INTERVAL HISTORY:  Patient was last seen in clinic Aug 19, 2021. At that visit she underwent US-guided right piriformis muscle Botox injection. She denies adverse effects from the procedure. In fact, she was quite pleased with her response noting 100% improvement until 2-3 weeks ago and now approximately 75%. She was please that she could tolerate sitting much longer and able to sit in/attend [employer] and go for longer car rides.     PRIOR INJURIES/TREATMENT:   Physical Therapy:   Multiple courses of prior PT.   Completed PT in Spring 2021 closer to home for Piriformis program.        - Current Pain Medications -   Ibuprofen   Gabapentin      Prior Procedures:  Multiple prior lumbar interventional pain procedures prior to surgery                                     Date                 Procedure                    Improvement (%)  04/26/21          R Piriformis CSI (US)   75-80% ~6weeks  08/19/21 R Piriformis Botox (US) 100% x2.5mos then 75%     Prior Related Surgery per Orthopedic spine surgery documentation:  1/7/2020 - Posterior spinal fusion L-2.   Pedicle subtraction osteotomy L4.  Lorenzo-Flowers osteotomy L1-2. Transforaminal lumbar interbody fusion L1-L2. Insertion  structural intervertebral device L1-2.  Spinal instrumentation L1. Reinsertion spinal instrumentation L2-S1. Pelvic fixation Image-guided surgery. Bilateral sacroiliac joint fusion     11/2017 - L4 or L5 Alonzo revision and new hardware  03/2016 - Rods place in L3 or L4 to S1     Specialists Seen - (with most recent, available notes and clinic visits reviewed)   1. Orthopedic spine surgery - Dr. Segundo Samuels     She  has a past medical history of Asthma, Back pain, Flatback syndrome, GERD (gastroesophageal reflux disease), H/O systemic lupus erythematosus (SLE) (H), HLD (hyperlipidemia), HTN (hypertension), and Pseudoarthrosis of lumbar spine.     She  has a past surgical history that includes laparoscopic cholecystectomy (1985); laparoscopic appendectomy (1985); LAPAROTOMY UTERUS FIBROID TUMOR RESECTION/MYOMECTOMY (1985); Hysterectomy (1985); LAPAROSCOPY TUBAL LIGATION; Bunionectomy (Bilateral); Dental surgery; sinus surgery (1988); L2-5 TRANSFORAMINAL LUMBAR INTERBODY FUSION L5-S1 POSTERIOR LUMBAR INTERBODY FUSION (03/08/2016); L5-S1 pseudoarthrosis revision with BMP and iliac screws (11/07/2017); Optical tracking system fusion spine posterior lumbar three+ levels (N/A, 1/7/2020); and Optical tracking system fusion sacral iliac (N/A, 1/7/2020).      Family History  Her family history includes Cardiovascular in her mother and sister; Cerebrovascular Disease in her mother.      Social History:  She  reports that she has never smoked. She has never used smokeless tobacco. She reports current alcohol use. She reports that she does not use drugs.     She has a current medication list which includes the following prescription(s): acetaminophen, atorvastatin, furosemide, gabapentin, gabapentin, advil pm, levofloxacin, multivital, pantoprazole, and valacyclovir, and the following Facility-Administered Medications: botulinum toxin type a.      /78   Pulse 63   Temp 97.4  F (36.3  C)   Resp 16   SpO2 98%    General:  Pleasant, straightforward, WDWN individual.  Mental Status: Pleasant, direct, appropriate mood and affect  Resp: breathing is unlabored without audible wheeze  Vascular: Palpable pedal pulses, no cyanosis, no venous stasis changes  Heme: no visible ecchymosis or erythema on extremities  Skin: No notable rash     Musculoskeletal:  Tender over right GMax and piriformis    ASSESSMENT:   Carmelita Mina is a very pleasant 76 year old female who follows up for:    #. Bilateral Piriformis syndrome R>L (80:20%). 75-80% improvement in symptoms following R-sided piriformis muscle corticosteroid inj (04/26/21) and % improved after Botox inj  #. Piriformis muscle pain  #. Radicular pain of lumbosacral region  #. Spasm of muscle  #. IT band syndrome on the right   #. S/P L2-pelvis fusion with pedicle substraction osteotomy and bedrock fixation of the her bilateral SI joints. Last revision 01/2020 and doing significantly better.     PLAN:   - Repeat sonographically-guided right piriformis Botox injection   - RTC x3 months for repeat inj. Will attempt to coordinate with 2-yr post-op ortho spine follow-up.        Again, thank you for allowing me to participate in the care of your patient.      Sincerely,    Veronica Medina MD

## 2022-02-07 DIAGNOSIS — Z98.1 S/P SPINAL FUSION: Primary | ICD-10-CM

## 2022-02-17 ENCOUNTER — ANCILLARY PROCEDURE (OUTPATIENT)
Dept: GENERAL RADIOLOGY | Facility: CLINIC | Age: 77
End: 2022-02-17
Attending: ORTHOPAEDIC SURGERY
Payer: MEDICARE

## 2022-02-17 ENCOUNTER — OFFICE VISIT (OUTPATIENT)
Dept: ORTHOPEDICS | Facility: CLINIC | Age: 77
End: 2022-02-17
Payer: MEDICARE

## 2022-02-17 ENCOUNTER — OFFICE VISIT (OUTPATIENT)
Dept: PHYSICAL MEDICINE AND REHAB | Facility: CLINIC | Age: 77
End: 2022-02-17
Payer: MEDICARE

## 2022-02-17 VITALS — HEIGHT: 62 IN | WEIGHT: 220 LBS | BODY MASS INDEX: 40.48 KG/M2

## 2022-02-17 DIAGNOSIS — Z98.1 S/P SPINAL FUSION: Primary | ICD-10-CM

## 2022-02-17 DIAGNOSIS — M79.18 PIRIFORMIS MUSCLE PAIN: ICD-10-CM

## 2022-02-17 DIAGNOSIS — Z98.1 S/P SPINAL FUSION: ICD-10-CM

## 2022-02-17 DIAGNOSIS — G57.01 PIRIFORMIS SYNDROME OF RIGHT SIDE: ICD-10-CM

## 2022-02-17 DIAGNOSIS — M62.838 SPASM OF MUSCLE: Primary | ICD-10-CM

## 2022-02-17 PROCEDURE — 72170 X-RAY EXAM OF PELVIS: CPT | Performed by: RADIOLOGY

## 2022-02-17 PROCEDURE — 96372 THER/PROPH/DIAG INJ SC/IM: CPT | Performed by: PHYSICAL MEDICINE & REHABILITATION

## 2022-02-17 PROCEDURE — 99213 OFFICE O/P EST LOW 20 MIN: CPT | Performed by: ORTHOPAEDIC SURGERY

## 2022-02-17 PROCEDURE — 64642 CHEMODENERV 1 EXTREMITY 1-4: CPT | Mod: 59 | Performed by: PHYSICAL MEDICINE & REHABILITATION

## 2022-02-17 PROCEDURE — 77073 BONE LENGTH STUDIES: CPT | Performed by: STUDENT IN AN ORGANIZED HEALTH CARE EDUCATION/TRAINING PROGRAM

## 2022-02-17 PROCEDURE — 72082 X-RAY EXAM ENTIRE SPI 2/3 VW: CPT | Performed by: STUDENT IN AN ORGANIZED HEALTH CARE EDUCATION/TRAINING PROGRAM

## 2022-02-17 PROCEDURE — 76942 ECHO GUIDE FOR BIOPSY: CPT | Performed by: PHYSICAL MEDICINE & REHABILITATION

## 2022-02-17 RX ORDER — ACETAMINOPHEN 500 MG
500-1000 TABLET ORAL EVERY 8 HOURS PRN
COMMUNITY
End: 2023-03-31

## 2022-02-17 RX ORDER — MELATONIN 5 MG
1 TABLET,CHEWABLE ORAL AT BEDTIME
COMMUNITY
End: 2023-05-08

## 2022-02-17 NOTE — PROGRESS NOTES
HISTORY OF PRESENT ILLNESS:  Carmelita returns now 2 years out from a lumbar pedicles subtraction osteotomy.  She feels 100% better than she did before.  She is not perfect, but she feels so much better.  She has ongoing right piriformis syndrome that has responded very nicely to injections.  When I have her do a piriformis stretch, this does aggravate her pain and she has been doing some stretches, but not, perhaps as often as she should.    PHYSICAL EXAMINATION:  Shows a well-developed, well-nourished, overweight female, in no acute distress.  On ambulation, she has a slight Trendelenburg on the right side, which is the side of her piriformis syndrome.    IMAGING:  Radiographic exam includes EOS and Simmons view of the pelvis.  She has had a Bedrock technique along with the L1 to pelvis instrumentation.  She does have a slight proximal junctional kyphosis, which does not appear to be symptomatic at this point, this appears unchanged compared to imaging from a year ago.    ASSESSMENT:  Lumbar flat back syndrome, status post lumbar pelvic subtraction osteotomy, doing well.    PLAN:  She will continue following up with Dr. Medina, from the piriformis injections as needed and work on stretching.  I would like to see her back in 1 year, at which point in time, we will get repeat EOS and Simmons view images.

## 2022-02-17 NOTE — NURSING NOTE
Chief Complaint   Patient presents with     Procedure     US Guided Piriformis Botox injection     Cameron Holloway

## 2022-02-17 NOTE — LETTER
2/17/2022         RE: Carmelita Mina  [address] [facility] [address]        Dear Colleague,    Thank you for referring your patient, Carmelita Mina, to the [facility] ORTHOPEDIC CLINIC [place]. Please see a copy of my visit note below.    HISTORY OF PRESENT ILLNESS:  Carmelita returns now 2 years out from a lumbar pedicles subtraction osteotomy.  She feels 100% better than she did before.  She is not perfect, but she feels so much better.  She has ongoing right piriformis syndrome that has responded very nicely to injections.  When I have her do a piriformis stretch, this does aggravate her pain and she has been doing some stretches, but not, perhaps as often as she should.    PHYSICAL EXAMINATION:  Shows a well-developed, well-nourished, overweight female, in no acute distress.  On ambulation, she has a slight Trendelenburg on the right side, which is the side of her piriformis syndrome.    IMAGING:  Radiographic exam includes EOS and Simmons view of the pelvis.  She has had a Bedrock technique along with the L1 to pelvis instrumentation.  She does have a slight proximal junctional kyphosis, which does not appear to be symptomatic at this point, this appears unchanged compared to imaging from a year ago.    ASSESSMENT:  Lumbar flat back syndrome, status post lumbar pelvic subtraction osteotomy, doing well.    PLAN:  She will continue following up with Dr. Medina, from the piriformis injections as needed and work on stretching.  I would like to see her back in 1 year, at which point in time, we will get repeat EOS and Simmons view images.      Sincerely,      Segundo Samuels MD

## 2022-02-17 NOTE — LETTER
2/17/2022       RE: Carmelita Mina  [address] [facility] [address]     Dear Colleague,    Thank you for referring your patient, Carmelita Mina, to the [facility] PHYSICAL MEDICINE AND REHABILITATION CLINIC [place] at [facility]. Please see a copy of my visit note below.    PROCEDURE NOTE: Piriformis Muscle Botulinum Toxin-A (Botox) Injection Under Ultrasound Guidance    PROCEDURE DATE: 2/17/2022     PATIENT NAME: Carmelita Mina  YOB: 1945    ATTENDING PHYSICIAN: Veronica Medina MD  FELLOW/RESIDENT PHYSICIAN: None     PREOPERATIVE DIAGNOSIS:   1. Spasm of muscle    2. Piriformis syndrome of right side    3. Piriformis muscle pain    4. Muscle spasm    5. S/P spinal fusion    6. Fusion of sacral region of spine    POSTOPERATIVE DIAGNOSIS: same    PROCEDURE PERFORMED: Right Piriformis Muscle Botox Injection Under Ultrasound Guidance    ULTRASOUND WAS USED.    INDICATIONS FOR THE PROCEDURE:  Carmelita Mina is a 76 year old female who presents with piriformis syndrome and muscle spasm.     PROCEDURE AND FINDINGS:  She was greeted in the clinic. The risk, benefits and alternatives to the procedure were again reviewed with her and informed consent was obtained and the patient agreed to proceed. A time-out was performed. Following review alternatives, benefits and risks, the procedure was carried out under sterile prep with sterile gel. The use of direct sonographic guidance was used to ensure accurate placement of the needle (rather than non-guided injection) and required to minimize the risk of bleeding or injury to nearby neurovascular structures.     A 5-1MHz ultrasound transducer was used to visualize the relevant structures and determine the optimal needle path for the procedure.  2mL 1% lidocaine was infiltrated at the needle insertion site subcutaneously. Then, a 22 gauge 5-inch Quincke spinal needle was advanced from  lateral to medial utilizing an in-plane approach in long axis, under continuous ultrasound guidance to the piriformis muscle on the right. After negative aspiration, slow injection of the treatment solution 2mL total consisting of 100 units Botox reconstituted in 2mL of preservative free normal saline was instilled into affected area (x2 locations). The tip of the needle was visualized throughout the procedure. The remainder of the single-use vials were discarded.      She tolerated the procedure well, was discharged home in stable condition.     Follow-up will be in clinic x3 months for repeat injection.     COMPLICATIONS: None    COMMENTS: Patient continues to report 80 to 90% improvement in her right gluteal pain following Botox injections.  She also notes improvement in right lower extremity pain/paresthesias.  Symptoms started to increase approximately 2 weeks ago.       Veronica Medina MD

## 2022-02-17 NOTE — PROGRESS NOTES
PROCEDURE NOTE: Piriformis Muscle Botulinum Toxin-A (Botox) Injection Under Ultrasound Guidance    PROCEDURE DATE: 2/17/2022     PATIENT NAME: Carmelita Mina  YOB: 1945    ATTENDING PHYSICIAN: Veronica Medina MD  FELLOW/RESIDENT PHYSICIAN: None     PREOPERATIVE DIAGNOSIS:   1. Spasm of muscle    2. Piriformis syndrome of right side    3. Piriformis muscle pain    4. Muscle spasm    5. S/P spinal fusion    6. Fusion of sacral region of spine    POSTOPERATIVE DIAGNOSIS: same    PROCEDURE PERFORMED: Right Piriformis Muscle Botox Injection Under Ultrasound Guidance    ULTRASOUND WAS USED.    INDICATIONS FOR THE PROCEDURE:  Carmelita Mina is a 76 year old female who presents with piriformis syndrome and muscle spasm.     PROCEDURE AND FINDINGS:  She was greeted in the clinic. The risk, benefits and alternatives to the procedure were again reviewed with her and informed consent was obtained and the patient agreed to proceed. A time-out was performed. Following review alternatives, benefits and risks, the procedure was carried out under sterile prep with sterile gel. The use of direct sonographic guidance was used to ensure accurate placement of the needle (rather than non-guided injection) and required to minimize the risk of bleeding or injury to nearby neurovascular structures.     A 5-1MHz ultrasound transducer was used to visualize the relevant structures and determine the optimal needle path for the procedure.  2mL 1% lidocaine was infiltrated at the needle insertion site subcutaneously. Then, a 22 gauge 5-inch Quincke spinal needle was advanced from lateral to medial utilizing an in-plane approach in long axis, under continuous ultrasound guidance to the piriformis muscle on the right. After negative aspiration, slow injection of the treatment solution 2mL total consisting of 100 units Botox reconstituted in 2mL of preservative free normal saline was instilled into affected area (x2  locations). The tip of the needle was visualized throughout the procedure. The remainder of the single-use vials were discarded.      She tolerated the procedure well, was discharged home in stable condition.     Follow-up will be in clinic x3 months for repeat injection.     COMPLICATIONS: None    COMMENTS: Patient continues to report 80 to 90% improvement in her right gluteal pain following Botox injections.  She also notes improvement in right lower extremity pain/paresthesias.  Symptoms started to increase approximately 2 weeks ago.

## 2022-02-17 NOTE — NURSING NOTE
"Reason For Visit:   Chief Complaint   Patient presents with     RECHECK     DOS 1-7-20 Revision Lumbar Fusion for Pseudo & Broken Instrumentation & Flatback         Primary MD: Salvatore Rios  Ref. MD: Est    ?  No  Occupation retired       Date of surgery & Surgery:     1/7/2020 Posterior spinal fusion L-2.   Pedicle subtraction osteotomy L4.  Lorenzo-Flowers osteotomy L1-2. Transforaminal lumbar interbody fusion L1-L2. Insertion structural intervertebral device L1-2.  Spinal instrumentation L1. Reinsertion spinal instrumentation L2-S1. Pelvic fixation Image-guided surgery. Bilateral sacroiliac joint fusion     11/2017 L4 or L5 Alonzo revision and new hardware  3/2016  Rods place in L3 or L4 to S1     Smoker: No  Request smoking cessation information: No      Ht 1.58 m (5' 2.21\")   Wt 99.8 kg (220 lb)   BMI 39.97 kg/m      Pain Assessment  Patient Currently in Pain: Yes  0-10 Pain Scale: 3  Primary Pain Location: Back    Oswestry (IAM) Questionnaire    OSWESTRY DISABILITY INDEX 2/17/2022   Count 9   Sum 17   Oswestry Score (%) 37.78        Visual Analog Pain Scale  Back Pain Scale 0-10: 3.5  Right leg pain: 2 (tingling)  Left leg pain: 0  Neck Pain Scale 0-10: 0  Right arm pain: 0  Left arm pain: 0    Promis 10 Assessment    PROMIS 10 2/17/2022   In general, would you say your health is: Very good   In general, would you say your quality of life is: Good   In general, how would you rate your physical health? Good   In general, how would you rate your mental health, including your mood and your ability to think? Very good   In general, how would you rate your satisfaction with your social activities and relationships? Very good   In general, please rate how well you carry out your usual social activities and roles Very good   To what extent are you able to carry out your everyday physical activities such as walking, climbing stairs, carrying groceries, or moving a chair? Moderately   How often have " you been bothered by emotional problems such as feeling anxious, depressed or irritable? Never   How would you rate your fatigue on average? Mild   How would you rate your pain on average?   0 = No Pain  to  10 = Worst Imaginable Pain 3   In general, would you say your health is: 4   In general, would you say your quality of life is: 3   In general, how would you rate your physical health? 3   In general, how would you rate your mental health, including your mood and your ability to think? 4   In general, how would you rate your satisfaction with your social activities and relationships? 4   In general, please rate how well you carry out your usual social activities and roles. (This includes activities at home, at work and in your community, and responsibilities as a parent, child, spouse, employee, friend, etc.) 4   To what extent are you able to carry out your everyday physical activities such as walking, climbing stairs, carrying groceries, or moving a chair? 3   In the past 7 days, how often have you been bothered by emotional problems such as feeling anxious, depressed, or irritable? 1   In the past 7 days, how would you rate your fatigue on average? 2   In the past 7 days, how would you rate your pain on average, where 0 means no pain, and 10 means worst imaginable pain? 3   Global Mental Health Score 16   Global Physical Health Score 14   PROMIS TOTAL - SUBSCORES 30   Some recent data might be hidden                Loretta Brennan LPN

## 2022-05-21 ENCOUNTER — HEALTH MAINTENANCE LETTER (OUTPATIENT)
Age: 77
End: 2022-05-21

## 2022-05-26 ENCOUNTER — OFFICE VISIT (OUTPATIENT)
Dept: PHYSICAL MEDICINE AND REHAB | Facility: CLINIC | Age: 77
End: 2022-05-26
Payer: MEDICARE

## 2022-05-26 VITALS — OXYGEN SATURATION: 97 % | DIASTOLIC BLOOD PRESSURE: 109 MMHG | SYSTOLIC BLOOD PRESSURE: 144 MMHG | HEART RATE: 57 BPM

## 2022-05-26 DIAGNOSIS — M62.838 MUSCLE SPASM: Primary | ICD-10-CM

## 2022-05-26 DIAGNOSIS — G57.01 PIRIFORMIS SYNDROME OF RIGHT SIDE: ICD-10-CM

## 2022-05-26 DIAGNOSIS — M79.18 PIRIFORMIS MUSCLE PAIN: ICD-10-CM

## 2022-05-26 DIAGNOSIS — Z98.1 S/P SPINAL FUSION: ICD-10-CM

## 2022-05-26 DIAGNOSIS — M43.28 FUSION OF SACRAL REGION OF SPINE: ICD-10-CM

## 2022-05-26 PROCEDURE — 64642 CHEMODENERV 1 EXTREMITY 1-4: CPT | Mod: 59 | Performed by: PHYSICAL MEDICINE & REHABILITATION

## 2022-05-26 PROCEDURE — 76942 ECHO GUIDE FOR BIOPSY: CPT | Performed by: PHYSICAL MEDICINE & REHABILITATION

## 2022-05-26 PROCEDURE — 96372 THER/PROPH/DIAG INJ SC/IM: CPT | Performed by: PHYSICAL MEDICINE & REHABILITATION

## 2022-05-26 RX ORDER — LIDOCAINE HYDROCHLORIDE 20 MG/ML
2 INJECTION, SOLUTION INFILTRATION; PERINEURAL ONCE
Status: DISCONTINUED | OUTPATIENT
Start: 2022-05-26 | End: 2022-05-26

## 2022-05-26 ASSESSMENT — PAIN SCALES - GENERAL: PAINLEVEL: MILD PAIN (3)

## 2022-05-26 NOTE — LETTER
5/26/2022       RE: Carmelita Mina  [address] [facility] [address]     Dear Colleague,    Thank you for referring your patient, Carmelita Mina, to the [facility] PHYSICAL MEDICINE AND REHABILITATION CLINIC [place] at [facility]. Please see a copy of my visit note below.    PROCEDURE NOTE: Piriformis Muscle Botulinum Toxin-A (Botox) Injection Under Ultrasound Guidance    PROCEDURE DATE: 5/26/2022     PATIENT NAME: Carmelita Mina  YOB: 1945    ATTENDING PHYSICIAN: Veronica Medina MD  FELLOW/RESIDENT PHYSICIAN: None     PREOPERATIVE DIAGNOSIS:   Muscle spasm  (primary encounter diagnosis)  Piriformis syndrome of right side  Piriformis muscle pain  S/P spinal fusion  Fusion of sacral region of spine    OSTOPERATIVE DIAGNOSIS: same    PROCEDURE PERFORMED: Right Piriformis Muscle Botox Injection Under Ultrasound Guidance    ULTRASOUND WAS USED.    INDICATIONS FOR THE PROCEDURE:  Carmelita Mina is a 76 year old female who presents with piriformis syndrome and muscle spasm.     PROCEDURE AND FINDINGS:  She was greeted in the clinic. The risk, benefits and alternatives to the procedure were again reviewed with her and informed consent was obtained and the patient agreed to proceed. A time-out was performed. Following review alternatives, benefits and risks, the procedure was carried out under sterile prep with sterile gel. The use of direct sonographic guidance was used to ensure accurate placement of the needle (rather than non-guided injection) and required to minimize the risk of bleeding or injury to nearby neurovascular structures.     A 5-1MHz ultrasound transducer was used to visualize the relevant structures and determine the optimal needle path for the procedure.  2mL 1% lidocaine was infiltrated at the needle insertion site subcutaneously. Then, a 22 gauge 5-inch Quincke spinal needle was advanced from lateral to  medial utilizing an in-plane approach in long axis, under continuous ultrasound guidance to the piriformis muscle on the right. After negative aspiration, slow injection of the treatment solution 2mL total consisting of 100 units Botox reconstituted in 2mL of preservative free normal saline was instilled into affected area (x2 locations). The tip of the needle was visualized throughout the procedure. The remainder of the single-use vials were discarded.      She tolerated the procedure well, was discharged home in stable condition.     Follow-up will be in clinic x3 months for repeat injection.     COMPLICATIONS: None    COMMENTS:  None         Sincerely,    Veronica Medina MD

## 2022-05-26 NOTE — PROGRESS NOTES
PROCEDURE NOTE: Piriformis Muscle Botulinum Toxin-A (Botox) Injection Under Ultrasound Guidance    PROCEDURE DATE: 5/26/2022     PATIENT NAME: Carmelita Mina  YOB: 1945    ATTENDING PHYSICIAN: Veronica Medina MD  FELLOW/RESIDENT PHYSICIAN: None     PREOPERATIVE DIAGNOSIS:   Muscle spasm  (primary encounter diagnosis)  Piriformis syndrome of right side  Piriformis muscle pain  S/P spinal fusion  Fusion of sacral region of spine    OSTOPERATIVE DIAGNOSIS: same    PROCEDURE PERFORMED: Right Piriformis Muscle Botox Injection Under Ultrasound Guidance    ULTRASOUND WAS USED.    INDICATIONS FOR THE PROCEDURE:  Carmelita Mina is a 76 year old female who presents with piriformis syndrome and muscle spasm.     PROCEDURE AND FINDINGS:  She was greeted in the clinic. The risk, benefits and alternatives to the procedure were again reviewed with her and informed consent was obtained and the patient agreed to proceed. A time-out was performed. Following review alternatives, benefits and risks, the procedure was carried out under sterile prep with sterile gel. The use of direct sonographic guidance was used to ensure accurate placement of the needle (rather than non-guided injection) and required to minimize the risk of bleeding or injury to nearby neurovascular structures.     A 5-1MHz ultrasound transducer was used to visualize the relevant structures and determine the optimal needle path for the procedure.  2mL 1% lidocaine was infiltrated at the needle insertion site subcutaneously. Then, a 22 gauge 5-inch Quincke spinal needle was advanced from lateral to medial utilizing an in-plane approach in long axis, under continuous ultrasound guidance to the piriformis muscle on the right. After negative aspiration, slow injection of the treatment solution 2mL total consisting of 100 units Botox reconstituted in 2mL of preservative free normal saline was instilled into affected area (x2 locations). The tip of  the needle was visualized throughout the procedure. The remainder of the single-use vials were discarded.      She tolerated the procedure well, was discharged home in stable condition.     Follow-up will be in clinic x3 months for repeat injection.     COMPLICATIONS: None    COMMENTS:  None

## 2022-06-21 ENCOUNTER — MYC MEDICAL ADVICE (OUTPATIENT)
Dept: ORTHOPEDICS | Facility: CLINIC | Age: 77
End: 2022-06-21

## 2022-06-21 DIAGNOSIS — M54.50 CHRONIC BILATERAL LOW BACK PAIN WITHOUT SCIATICA: ICD-10-CM

## 2022-06-21 DIAGNOSIS — M54.9 BACK PAIN: ICD-10-CM

## 2022-06-21 DIAGNOSIS — Z98.1 HISTORY OF LUMBAR FUSION: Primary | ICD-10-CM

## 2022-06-21 DIAGNOSIS — S32.009K PSEUDOARTHROSIS OF LUMBAR SPINE: ICD-10-CM

## 2022-06-21 DIAGNOSIS — G89.29 CHRONIC BILATERAL LOW BACK PAIN WITHOUT SCIATICA: ICD-10-CM

## 2022-06-21 DIAGNOSIS — M40.30 FLATBACK SYNDROME: Primary | ICD-10-CM

## 2022-07-06 ENCOUNTER — MYC MEDICAL ADVICE (OUTPATIENT)
Dept: ORTHOPEDICS | Facility: CLINIC | Age: 77
End: 2022-07-06

## 2022-07-06 DIAGNOSIS — S32.009K PSEUDOARTHROSIS OF LUMBAR SPINE: ICD-10-CM

## 2022-07-06 DIAGNOSIS — M40.30 FLATBACK SYNDROME: ICD-10-CM

## 2022-07-06 DIAGNOSIS — M43.16 SPONDYLOLISTHESIS OF LUMBAR REGION: ICD-10-CM

## 2022-07-06 DIAGNOSIS — Z98.1 HISTORY OF LUMBAR FUSION: Primary | ICD-10-CM

## 2022-07-15 ENCOUNTER — TELEPHONE (OUTPATIENT)
Dept: PHYSICAL MEDICINE AND REHAB | Facility: CLINIC | Age: 77
End: 2022-07-15

## 2022-07-15 NOTE — TELEPHONE ENCOUNTER
[facility] Call Center    Phone Message    May a detailed message be left on voicemail: yes     Reason for Call: Other: Pt is calling because she has an appt with Dr. Medina on 08/25. She is wondering if she could have a few extra minutes with Dr. Medina to discuss her balance concerns. Says Dr. Samuels recommended she discuss with neurology. Please call pt back.    Action Taken: Message routed to:  [facility] ([facility] PM&R    Travel Screening: Not Applicable

## 2022-07-15 NOTE — TELEPHONE ENCOUNTER
See multiple My Chart messages & replies.  I tried to call pt many times yesterday & finally got a hold of her this am 7-15-22.  I told her  looked at XR done at home & sent to us for LBP.  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  Aging process.    If did need surgery in future , it would be a very big surgery.  If pain or symptoms become intolerable then make RTN appt  She agreed.    She saw DR Medina PMR 5-26-22 & has repeat injection scheduled with  for 8-25-22. States her concern now is slight Balance issues   So I advised she make RTN  appt for eval & she agreed.  Has ph#. Call back prn.  Pt agreed. [email or web address]./Christine Russo RN.

## 2022-07-15 NOTE — TELEPHONE ENCOUNTER
She could talk to Dr. Rios about keeping her at 800 mg TID. She could also do PT or pool therapy for back/core exercises.

## 2022-07-19 NOTE — TELEPHONE ENCOUNTER
New orders placed per request from staff messages.     ----- Message from Christine Russo RN sent at 7/19/2022 10:49 AM CDT -----  Regarding: FW: PT orders for Pool and Land Therapy  Hello Deandra & Farideh Winter take my 2 orders from Fri. Because need advanced practice provider.    Please enter 2 new therapy orders & copy my additional info. Comments from my orders.  One is for Land PT core strengthing & One is for pool therapy -same. Thanks.  Christine LEIVA.   ----- Message -----  From: Maggy Sifuentes  Sent: 7/18/2022  10:23 AM CDT  To: Christine Russo RN  Subject: PT orders for Pool and Land Therapy              Hello,  We received an order for the above patient for therapy. Per order requirements, therapy orders are required to be signed by a physician or non-physician practitioners. This order is currently signed by an RN and we are unable to accept it.   Please have the order co-signed by a physician or NPP, or the RN can place the order in a co-signature mode.  Thank you for your help and understanding!  Sincerely, the Rehab Central Scheduling Team

## 2022-08-25 ENCOUNTER — OFFICE VISIT (OUTPATIENT)
Dept: PHYSICAL MEDICINE AND REHAB | Facility: CLINIC | Age: 77
End: 2022-08-25
Payer: MEDICARE

## 2022-08-25 VITALS
DIASTOLIC BLOOD PRESSURE: 82 MMHG | HEART RATE: 70 BPM | OXYGEN SATURATION: 97 % | SYSTOLIC BLOOD PRESSURE: 122 MMHG | TEMPERATURE: 97.8 F

## 2022-08-25 DIAGNOSIS — M62.838 MUSCLE SPASM: Primary | ICD-10-CM

## 2022-08-25 DIAGNOSIS — Z98.1 S/P SPINAL FUSION: ICD-10-CM

## 2022-08-25 DIAGNOSIS — M79.18 PIRIFORMIS MUSCLE PAIN: ICD-10-CM

## 2022-08-25 DIAGNOSIS — G57.01 PIRIFORMIS SYNDROME OF RIGHT SIDE: ICD-10-CM

## 2022-08-25 PROCEDURE — 64642 CHEMODENERV 1 EXTREMITY 1-4: CPT | Mod: 59 | Performed by: PHYSICAL MEDICINE & REHABILITATION

## 2022-08-25 PROCEDURE — 76942 ECHO GUIDE FOR BIOPSY: CPT | Performed by: PHYSICAL MEDICINE & REHABILITATION

## 2022-08-25 RX ORDER — MELOXICAM 7.5 MG/1
TABLET ORAL
COMMUNITY
Start: 2022-08-01

## 2022-08-25 ASSESSMENT — PAIN SCALES - GENERAL: PAINLEVEL: MILD PAIN (3)

## 2022-08-25 NOTE — LETTER
8/25/2022       RE: Carmelita Mina  [address] [facility] [address]     Dear Colleague,    Thank you for referring your patient, Carmelita Mina, to the [facility] PHYSICAL MEDICINE AND REHABILITATION CLINIC [place] at . Please see a copy of my visit note below.    PROCEDURE NOTE: Piriformis Muscle Botulinum Toxin-A (Botox) Injection Under Ultrasound Guidance    PROCEDURE DATE: 8/25/2022     PATIENT NAME: Carmelita Mina  YOB: 1945    ATTENDING PHYSICIAN: Veronica Medina MD  FELLOW/RESIDENT PHYSICIAN: None     PREOPERATIVE DIAGNOSIS:   Muscle spasm  (primary encounter diagnosis)  Piriformis syndrome of right side  Piriformis muscle pain  S/P spinal fusion  Fusion of sacral region of spine    OSTOPERATIVE DIAGNOSIS: same    PROCEDURE PERFORMED: Right Piriformis Muscle Botox Injection Under Ultrasound Guidance    ULTRASOUND WAS USED.    INDICATIONS FOR THE PROCEDURE:  Carmelita Mina is a 76 year old female who presents with piriformis syndrome and muscle spasm.     PROCEDURE AND FINDINGS:  She was greeted in the clinic. The risk, benefits and alternatives to the procedure were again reviewed with her and informed consent was obtained and the patient agreed to proceed. A time-out was performed. Following review alternatives, benefits and risks, the procedure was carried out under sterile prep with sterile gel. The use of direct sonographic guidance was used to ensure accurate placement of the needle (rather than non-guided injection) and required to minimize the risk of bleeding or injury to nearby neurovascular structures.     A 5-1MHz ultrasound transducer was used to visualize the relevant structures and determine the optimal needle path for the procedure.  2mL 1% lidocaine was infiltrated at the needle insertion site subcutaneously. Then, a 22 gauge 5-inch Quincke spinal needle was advanced from lateral to  medial utilizing an in-plane approach in long axis, under continuous ultrasound guidance to the piriformis muscle on the right. After negative aspiration, slow injection of the treatment solution 2mL total consisting of 100 units Botox reconstituted in 2mL of preservative free normal saline was instilled into affected area (x2 locations). The tip of the needle was visualized throughout the procedure. The remainder of the single-use vials were discarded.      She tolerated the procedure well, was discharged home in stable condition.     Follow-up will be in clinic x3 months for repeat injection.     COMPLICATIONS: None    COMMENTS: Patient is accompanied today by her daughter.  Continues to report 80% improvement x2.5 months after Botox injections.  She states that her symptoms have returned towards baseline over the last few weeks but finds that the injections have been quite helpful for her.    Additionally, she states that she has been working with her primary care physician and optimizing medication options.  She has increase gabapentin to 800 mg twice daily and has discussed other options including medical marijuana for chronic pain.     She has been referred to physical therapy through orthopedic spine surgery clinic and has started land-based PT, as well as, some pool therapy.    She endorses increased symptoms affecting her thoracolumbar spine and inquires about an epidural.  We discussed the possibility of bilateral T12-L1 TFESI but recommend working with her PCP for longer term, chronic pain management options to see if those options help.  If not, or if she has an acute flare, we may build to accommodate this request which I think would be reasonable      Sincerely,    Veronica Medina MD

## 2022-08-25 NOTE — PROGRESS NOTES
PROCEDURE NOTE: Piriformis Muscle Botulinum Toxin-A (Botox) Injection Under Ultrasound Guidance    PROCEDURE DATE: 8/25/2022     PATIENT NAME: Carmelita Mina  YOB: 1945    ATTENDING PHYSICIAN: Veronica Medina MD  FELLOW/RESIDENT PHYSICIAN: None     PREOPERATIVE DIAGNOSIS:   Muscle spasm  (primary encounter diagnosis)  Piriformis syndrome of right side  Piriformis muscle pain  S/P spinal fusion  Fusion of sacral region of spine    OSTOPERATIVE DIAGNOSIS: same    PROCEDURE PERFORMED: Right Piriformis Muscle Botox Injection Under Ultrasound Guidance    ULTRASOUND WAS USED.    INDICATIONS FOR THE PROCEDURE:  Carmelita Mina is a 76 year old female who presents with piriformis syndrome and muscle spasm.     PROCEDURE AND FINDINGS:  She was greeted in the clinic. The risk, benefits and alternatives to the procedure were again reviewed with her and informed consent was obtained and the patient agreed to proceed. A time-out was performed. Following review alternatives, benefits and risks, the procedure was carried out under sterile prep with sterile gel. The use of direct sonographic guidance was used to ensure accurate placement of the needle (rather than non-guided injection) and required to minimize the risk of bleeding or injury to nearby neurovascular structures.     A 5-1MHz ultrasound transducer was used to visualize the relevant structures and determine the optimal needle path for the procedure.  2mL 1% lidocaine was infiltrated at the needle insertion site subcutaneously. Then, a 22 gauge 5-inch Quincke spinal needle was advanced from lateral to medial utilizing an in-plane approach in long axis, under continuous ultrasound guidance to the piriformis muscle on the right. After negative aspiration, slow injection of the treatment solution 2mL total consisting of 100 units Botox reconstituted in 2mL of preservative free normal saline was instilled into affected area (x2 locations). The tip of  the needle was visualized throughout the procedure. The remainder of the single-use vials were discarded.      She tolerated the procedure well, was discharged home in stable condition.     Follow-up will be in clinic x3 months for repeat injection.     COMPLICATIONS: None    COMMENTS: Patient is accompanied today by her daughter.  Continues to report 80% improvement x2.5 months after Botox injections.  She states that her symptoms have returned towards baseline over the last few weeks but finds that the injections have been quite helpful for her.    Additionally, she states that she has been working with her primary care physician and optimizing medication options.  She has increase gabapentin to 800 mg twice daily and has discussed other options including medical marijuana for chronic pain.     She has been referred to physical therapy through orthopedic spine surgery clinic and has started land-based PT, as well as, some pool therapy.    She endorses increased symptoms affecting her thoracolumbar spine and inquires about an epidural.  We discussed the possibility of bilateral T12-L1 TFESI but recommend working with her PCP for longer term, chronic pain management options to see if those options help.  If not, or if she has an acute flare, we may build to accommodate this request which I think would be reasonable

## 2022-09-18 ENCOUNTER — HEALTH MAINTENANCE LETTER (OUTPATIENT)
Age: 77
End: 2022-09-18

## 2022-11-21 ENCOUNTER — OFFICE VISIT (OUTPATIENT)
Dept: PHYSICAL MEDICINE AND REHAB | Facility: CLINIC | Age: 77
End: 2022-11-21
Payer: MEDICARE

## 2022-11-21 VITALS
TEMPERATURE: 97.4 F | SYSTOLIC BLOOD PRESSURE: 116 MMHG | HEART RATE: 64 BPM | DIASTOLIC BLOOD PRESSURE: 80 MMHG | OXYGEN SATURATION: 99 %

## 2022-11-21 DIAGNOSIS — Z98.1 S/P SPINAL FUSION: ICD-10-CM

## 2022-11-21 DIAGNOSIS — G57.01 PIRIFORMIS SYNDROME OF RIGHT SIDE: ICD-10-CM

## 2022-11-21 DIAGNOSIS — M62.838 SPASM OF MUSCLE: ICD-10-CM

## 2022-11-21 DIAGNOSIS — M62.838 MUSCLE SPASM: Primary | ICD-10-CM

## 2022-11-21 DIAGNOSIS — M79.18 PIRIFORMIS MUSCLE PAIN: ICD-10-CM

## 2022-11-21 DIAGNOSIS — M43.28 FUSION OF SACRAL REGION OF SPINE: ICD-10-CM

## 2022-11-21 PROCEDURE — 64642 CHEMODENERV 1 EXTREMITY 1-4: CPT | Performed by: PHYSICAL MEDICINE & REHABILITATION

## 2022-11-21 ASSESSMENT — PAIN SCALES - GENERAL: PAINLEVEL: MODERATE PAIN (4)

## 2022-11-21 NOTE — PROGRESS NOTES
PROCEDURE NOTE: Piriformis Muscle Botulinum Toxin-A (Botox) Injection Under Ultrasound Guidance    PROCEDURE DATE: 11/21/2022     PATIENT NAME: Carmelita Mina  YOB: 1945    ATTENDING PHYSICIAN: Veronica Medina MD  FELLOW/RESIDENT PHYSICIAN: None     PREOPERATIVE DIAGNOSIS:   Muscle spasm  (primary encounter diagnosis)  Piriformis syndrome of right side  Piriformis muscle pain  S/P spinal fusion  Fusion of sacral region of spine    OSTOPERATIVE DIAGNOSIS: same    PROCEDURE PERFORMED: Right Piriformis Muscle Botox Injection Under Ultrasound Guidance    ULTRASOUND WAS USED.    INDICATIONS FOR THE PROCEDURE:  Carmelita Mina is a 77 year old female who presents with piriformis syndrome and muscle spasm.     PROCEDURE AND FINDINGS:  She was greeted in the clinic. The risk, benefits and alternatives to the procedure were again reviewed with her and informed consent was obtained and the patient agreed to proceed. A time-out was performed. Following review alternatives, benefits and risks, the procedure was carried out under sterile prep with sterile gel. The use of direct sonographic guidance was used to ensure accurate placement of the needle (rather than non-guided injection) and required to minimize the risk of bleeding or injury to nearby neurovascular structures.     A 5-1MHz ultrasound transducer was used to visualize the relevant structures and determine the optimal needle path for the procedure.  2mL 1% lidocaine was infiltrated at the needle insertion site subcutaneously. Then, a 22 gauge 5-inch Quincke spinal needle was advanced from lateral to medial utilizing an in-plane approach in long axis, under continuous ultrasound guidance to the piriformis muscle on the right. After negative aspiration, slow injection of the treatment solution 2mL total consisting of 100 units Botox reconstituted in 2mL of preservative free normal saline was instilled into affected area (x2 locations). The tip of  the needle was visualized throughout the procedure. The remainder of the single-use vials were discarded.      She tolerated the procedure well, was discharged home in stable condition.     Follow-up will be in clinic x3 months for repeat injection.     COMPLICATIONS: None    COMMENTS: Doing well with continued piriformis muscle Botox injections.  She reports 80 to 85% improvement ~8 and pleased with reduction in symptoms.

## 2022-11-21 NOTE — NURSING NOTE
Chief Complaint   Patient presents with     Procedure     Right piriformis Botox injections     Tha Soares, EMT

## 2022-11-21 NOTE — LETTER
11/21/2022       RE: Carmelita Mina  [address] [facility] [address]     Dear Colleague,    Thank you for referring your patient, Carmelita Mina, to the [facility] PHYSICAL MEDICINE AND REHABILITATION CLINIC [place] at [facility]. Please see a copy of my visit note below.    PROCEDURE NOTE: Piriformis Muscle Botulinum Toxin-A (Botox) Injection Under Ultrasound Guidance    PROCEDURE DATE: 11/21/2022     PATIENT NAME: Carmelita Mina  YOB: 1945    ATTENDING PHYSICIAN: Veronica Medina MD  FELLOW/RESIDENT PHYSICIAN: None     PREOPERATIVE DIAGNOSIS:   Muscle spasm  (primary encounter diagnosis)  Piriformis syndrome of right side  Piriformis muscle pain  S/P spinal fusion  Fusion of sacral region of spine    OSTOPERATIVE DIAGNOSIS: same    PROCEDURE PERFORMED: Right Piriformis Muscle Botox Injection Under Ultrasound Guidance    ULTRASOUND WAS USED.    INDICATIONS FOR THE PROCEDURE:  Carmelita Mina is a 77 year old female who presents with piriformis syndrome and muscle spasm.     PROCEDURE AND FINDINGS:  She was greeted in the clinic. The risk, benefits and alternatives to the procedure were again reviewed with her and informed consent was obtained and the patient agreed to proceed. A time-out was performed. Following review alternatives, benefits and risks, the procedure was carried out under sterile prep with sterile gel. The use of direct sonographic guidance was used to ensure accurate placement of the needle (rather than non-guided injection) and required to minimize the risk of bleeding or injury to nearby neurovascular structures.     A 5-1MHz ultrasound transducer was used to visualize the relevant structures and determine the optimal needle path for the procedure.  2mL 1% lidocaine was infiltrated at the needle insertion site subcutaneously. Then, a 22 gauge 5-inch Quincke spinal needle was advanced from lateral to  medial utilizing an in-plane approach in long axis, under continuous ultrasound guidance to the piriformis muscle on the right. After negative aspiration, slow injection of the treatment solution 2mL total consisting of 100 units Botox reconstituted in 2mL of preservative free normal saline was instilled into affected area (x2 locations). The tip of the needle was visualized throughout the procedure. The remainder of the single-use vials were discarded.      She tolerated the procedure well, was discharged home in stable condition.     Follow-up will be in clinic x3 months for repeat injection.     COMPLICATIONS: None    COMMENTS: Doing well with continued piriformis muscle Botox injections.  She reports 80 to 85% improvement ~8 and pleased with reduction in symptoms.             Again, thank you for allowing me to participate in the care of your patient.      Sincerely,    Veronica Medina MD

## 2023-01-31 ENCOUNTER — TELEPHONE (OUTPATIENT)
Dept: PHYSICAL MEDICINE AND REHAB | Facility: CLINIC | Age: 78
End: 2023-01-31
Payer: MEDICARE

## 2023-01-31 NOTE — TELEPHONE ENCOUNTER
Reached out to pt, she wanted to coordinate bother appts for same day Dr Samuels's and Dr Medina's, told her unable to since there days don't match. Pt agreed to keep appt and asked to be on a wait list if earlier appt would turn up.

## 2023-01-31 NOTE — TELEPHONE ENCOUNTER
[facility] Call Center    Phone Message    May a detailed message be left on voicemail: yes     Reason for Call: Other: Patient called stating she has a  on day of appointment 2023. Patient is wondering if she can be squeezed in at 8:00am. Patient states they live far away and it is difficult to commute.    Action Taken: Message routed to:  [facility] PM&R    Travel Screening: Not Applicable

## 2023-02-13 ENCOUNTER — OFFICE VISIT (OUTPATIENT)
Dept: PHYSICAL MEDICINE AND REHAB | Facility: CLINIC | Age: 78
End: 2023-02-13
Payer: MEDICARE

## 2023-02-13 VITALS
HEART RATE: 67 BPM | DIASTOLIC BLOOD PRESSURE: 95 MMHG | TEMPERATURE: 98.4 F | SYSTOLIC BLOOD PRESSURE: 139 MMHG | RESPIRATION RATE: 16 BRPM | OXYGEN SATURATION: 95 %

## 2023-02-13 DIAGNOSIS — M43.28 FUSION OF SACRAL REGION OF SPINE: ICD-10-CM

## 2023-02-13 DIAGNOSIS — M62.838 MUSCLE SPASM: Primary | ICD-10-CM

## 2023-02-13 DIAGNOSIS — G57.01 PIRIFORMIS SYNDROME OF RIGHT SIDE: ICD-10-CM

## 2023-02-13 DIAGNOSIS — M79.18 PIRIFORMIS MUSCLE PAIN: ICD-10-CM

## 2023-02-13 DIAGNOSIS — Z98.1 S/P SPINAL FUSION: ICD-10-CM

## 2023-02-13 DIAGNOSIS — Z98.1 S/P SPINAL FUSION: Primary | ICD-10-CM

## 2023-02-13 PROCEDURE — 64642 CHEMODENERV 1 EXTREMITY 1-4: CPT | Performed by: PHYSICAL MEDICINE & REHABILITATION

## 2023-02-13 PROCEDURE — 76942 ECHO GUIDE FOR BIOPSY: CPT | Performed by: PHYSICAL MEDICINE & REHABILITATION

## 2023-02-13 NOTE — LETTER
2/13/2023       RE: Carmelita Mina  [address] [facility] [address]     Dear Colleague,    Thank you for referring your patient, Carmelita Mina, to the [facility] PHYSICAL MEDICINE AND REHABILITATION CLINIC [place] at [facility]. Please see a copy of my visit note below.    PROCEDURE NOTE: Piriformis Muscle Botulinum Toxin-A (Botox) Injection Under Ultrasound Guidance    PROCEDURE DATE: 2/13/2023     PATIENT NAME: Carmelita Mina  YOB: 1945    ATTENDING PHYSICIAN: Veronica Medina MD  FELLOW/RESIDENT PHYSICIAN: None     PREOPERATIVE DIAGNOSIS:   Muscle spasm  (primary encounter diagnosis)  Piriformis syndrome of right side  Piriformis muscle pain  S/P spinal fusion  Fusion of sacral region of spine    OSTOPERATIVE DIAGNOSIS: same    PROCEDURE PERFORMED: Right Piriformis Muscle Botox Injection Under Ultrasound Guidance    ULTRASOUND WAS USED.    INDICATIONS FOR THE PROCEDURE:  Carmelita Mina is a 77 year old female who presents with piriformis syndrome and muscle spasm.     PROCEDURE AND FINDINGS:  She was greeted in the clinic. The risk, benefits and alternatives to the procedure were again reviewed with her and informed consent was obtained and the patient agreed to proceed. A time-out was performed. Following review alternatives, benefits and risks, the procedure was carried out under sterile prep with sterile gel. The use of direct sonographic guidance was used to ensure accurate placement of the needle (rather than non-guided injection) and required to minimize the risk of bleeding or injury to nearby neurovascular structures.     A 5-1MHz ultrasound transducer was used to visualize the relevant structures and determine the optimal needle path for the procedure.  2mL 1% lidocaine was infiltrated at the needle insertion site subcutaneously. Then, a 22 gauge 5-inch Quincke spinal needle was advanced from lateral to  medial utilizing an in-plane approach in long axis, under continuous ultrasound guidance to the piriformis muscle on the right. After negative aspiration, slow injection of the treatment solution 2mL total consisting of 100 units Botox reconstituted in 2mL of preservative free normal saline was instilled into affected area. The tip of the needle was visualized throughout the procedure. The remainder of the single-use vials were discarded.      She tolerated the procedure well, was discharged home in stable condition.     Follow-up will be in clinic x3 months for repeat injection.     COMPLICATIONS: None    COMMENTS: Doing well with continued piriformis muscle Botox injections. Reports 80 to 85% improvement ~8weeks and pleased with reduction in symptoms.         Sincerely,    Veronica Medina MD

## 2023-02-13 NOTE — PROGRESS NOTES
PROCEDURE NOTE: Piriformis Muscle Botulinum Toxin-A (Botox) Injection Under Ultrasound Guidance    PROCEDURE DATE: 2/13/2023     PATIENT NAME: Carmelita Mina  YOB: 1945    ATTENDING PHYSICIAN: Veronica Medina MD  FELLOW/RESIDENT PHYSICIAN: None     PREOPERATIVE DIAGNOSIS:   Muscle spasm  (primary encounter diagnosis)  Piriformis syndrome of right side  Piriformis muscle pain  S/P spinal fusion  Fusion of sacral region of spine    OSTOPERATIVE DIAGNOSIS: same    PROCEDURE PERFORMED: Right Piriformis Muscle Botox Injection Under Ultrasound Guidance    ULTRASOUND WAS USED.    INDICATIONS FOR THE PROCEDURE:  Carmelita Mina is a 77 year old female who presents with piriformis syndrome and muscle spasm.     PROCEDURE AND FINDINGS:  She was greeted in the clinic. The risk, benefits and alternatives to the procedure were again reviewed with her and informed consent was obtained and the patient agreed to proceed. A time-out was performed. Following review alternatives, benefits and risks, the procedure was carried out under sterile prep with sterile gel. The use of direct sonographic guidance was used to ensure accurate placement of the needle (rather than non-guided injection) and required to minimize the risk of bleeding or injury to nearby neurovascular structures.     A 5-1MHz ultrasound transducer was used to visualize the relevant structures and determine the optimal needle path for the procedure.  2mL 1% lidocaine was infiltrated at the needle insertion site subcutaneously. Then, a 22 gauge 5-inch Quincke spinal needle was advanced from lateral to medial utilizing an in-plane approach in long axis, under continuous ultrasound guidance to the piriformis muscle on the right. After negative aspiration, slow injection of the treatment solution 2mL total consisting of 100 units Botox reconstituted in 2mL of preservative free normal saline was instilled into affected area. The tip of the needle was  visualized throughout the procedure. The remainder of the single-use vials were discarded.      She tolerated the procedure well, was discharged home in stable condition.     Follow-up will be in clinic x3 months for repeat injection.     COMPLICATIONS: None    COMMENTS: Doing well with continued piriformis muscle Botox injections. Reports 80 to 85% improvement ~8weeks and pleased with reduction in symptoms.

## 2023-02-15 ASSESSMENT — ENCOUNTER SYMPTOMS
TASTE DISTURBANCE: 1
NECK MASS: 0
SINUS PAIN: 1
SORE THROAT: 0
SINUS CONGESTION: 1
HOARSE VOICE: 1
SMELL DISTURBANCE: 1
TROUBLE SWALLOWING: 0

## 2023-02-16 ENCOUNTER — ANCILLARY PROCEDURE (OUTPATIENT)
Dept: GENERAL RADIOLOGY | Facility: CLINIC | Age: 78
End: 2023-02-16
Attending: ORTHOPAEDIC SURGERY
Payer: MEDICARE

## 2023-02-16 ENCOUNTER — OFFICE VISIT (OUTPATIENT)
Dept: ORTHOPEDICS | Facility: CLINIC | Age: 78
End: 2023-02-16
Payer: MEDICARE

## 2023-02-16 DIAGNOSIS — Z98.1 S/P SPINAL FUSION: ICD-10-CM

## 2023-02-16 DIAGNOSIS — G58.8 CLUNEAL NEUROPATHY: Primary | ICD-10-CM

## 2023-02-16 PROCEDURE — 99213 OFFICE O/P EST LOW 20 MIN: CPT | Performed by: PHYSICIAN ASSISTANT

## 2023-02-16 PROCEDURE — 72170 X-RAY EXAM OF PELVIS: CPT | Mod: GC | Performed by: RADIOLOGY

## 2023-02-16 PROCEDURE — 72082 X-RAY EXAM ENTIRE SPI 2/3 VW: CPT | Performed by: STUDENT IN AN ORGANIZED HEALTH CARE EDUCATION/TRAINING PROGRAM

## 2023-02-16 PROCEDURE — 77073 BONE LENGTH STUDIES: CPT | Performed by: STUDENT IN AN ORGANIZED HEALTH CARE EDUCATION/TRAINING PROGRAM

## 2023-02-16 NOTE — PROGRESS NOTES
REASON FOR VISIT: Surgical follow-up    REFERRING PHYSICIAN: No ref. provider found     PRIMARY CARE PHYSICIAN: Salvatore Rios    HISTORY OF PRESENT ILLNESS: Carmelita Mina is a 77 year old female who presents for follow-up after undergoing L1 fusion extension, L4 PSO , L1-L2 TLIF and SPO , reinsertion of instrumentation from L2-S1, bilateral pelvic fixation  Bilateral bedrock instrumentation by Dr. Samuels. Overall, patient feels 80% improvement.       She notes that surgery drastically improved her symptoms.  Still experiencing right buttock pain.  Notes improvement with Botox injection by Dr. Medina.  Notes that she recently had an injection on Monday.  Has been experiencing 6 months of new buttock type pain that she grades a 4 out of 10 at its worst.  Believes that this comes on when she is at the end of her course of her Botox.  She has no concerns.  She has no radicular symptoms down her legs.      Oswestry score:   Oswestry (IAM) Questionnaire    OSWESTRY DISABILITY INDEX 2/9/2023   Count 9   Sum 17   Oswestry Score (%) 37.78       Visual Analog Scale (VAS) Questionnaire    VISUAL ANALOG PAIN SCALE 2/16/2023   Back Pain Scale 0-10 3   Right leg pain 0   Left leg pain 0   Neck Pain Scale 0-10 0   Right arm pain 0   Left arm pain 0        PHYSICAL EXAM:  There were no vitals taken for this visit.     Constitutional: Patient is healthy, well-nourished and appears stated age.    Skin: Incision healing well, no dehiscence, drainage, or erythema.     Gait: Non-antalgic gait without use of assistive devices. Able to tandem gait without difficulty.     Neurologic - Sensation intact to light touch bilaterally. Deep tendon reflexes 2+ bilateral patella.     Musculoskeletal: Strength: 5 out of 5 bilateral hip flexion knee extension dorsiflexion plantarflexion    Spine: Pain to palpation on the right superior cluneal nerve, also at PSIS.  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     XR lumbar 2/16/2023    No concerns to instrumentation.  No fractures to screws.  Mild L1-L2 proximal junction kyphosis unchanged from previous imaging 1 year ago.        XR Pelvis    Mild sclerosis around the right triangular implants.  No abnormal positioning of SI instrumentation        CLINICAL ASSESSMENT:   1. 77 year old female 3 years status post L1 fusion extension, L4 PSO, L1-L2 TLIF and SPO    DISCUSSION/PLAN:   She is pleased with surgical outcome so far.  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  She also has a right piriformis syndrome that has been improving with her Botox injections with Dr. Medina.  Educated that we will plan on reaching out to Dr. Medina for discussion for interventions to her right superior cluneal nerve neuropathy.  PM&R referral placed to Dr. Medina and he was reached out via inbox.  Plan to follow-up in 1 year    Pleased with surgical outcome so far.    - RTC in 1 year with EOS full spine and XR Pelvis  - PM&R referral for right superior cluneal nerve neuropathy.     All questions and concerns were answered to the patient's apparent satisfaction before leaving the clinic. The plan was reviewed with Dr. Samuels who reviewed the imaging and also saw the patient.     Thank you for allowing Dr. Samuels and I to participate in the care of Carmelita Mina.    Respectfully,  Bishop TANIKA Macdonald PA-C     I saw and evaluated the patient and developed the plan.  I independently interpreted her EOS and pelvis x-rays. I determined her cluneal nerve issue. I explained the possible treatment strategies.  Segundo Samuels MD      CC  Copy to patient    [address]  [facility] [address]

## 2023-02-16 NOTE — LETTER
2/16/2023         RE: Carmelita Mina  [address] [facility] [address]        Dear Colleague,    Thank you for referring your patient, Carmelita Mina, to the [facility] [place]. Please see a copy of my visit note below.    REASON FOR VISIT: Surgical follow-up    REFERRING PHYSICIAN: No ref. provider found     PRIMARY CARE PHYSICIAN: Salvatore Rios    HISTORY OF PRESENT ILLNESS: Carmelita Mina is a 77 year old female who presents for follow-up after undergoing L1 fusion extension, L4 PSO , L1-L2 TLIF and SPO , reinsertion of instrumentation from L2-S1, bilateral pelvic fixation  Bilateral bedrock instrumentation by Dr. Samuels. Overall, patient feels 80% improvement.       She notes that surgery drastically improved her symptoms.  Still experiencing right buttock pain.  Notes improvement with Botox injection by Dr. Medina.  Notes that she recently had an injection on Monday.  Has been experiencing 6 months of new buttock type pain that she grades a 4 out of 10 at its worst.  Believes that this comes on when she is at the end of her course of her Botox.  She has no concerns.  She has no radicular symptoms down her legs.      Oswestry score:   Oswestry (IAM) Questionnaire    OSWESTRY DISABILITY INDEX 2/9/2023   Count 9   Sum 17   Oswestry Score (%) 37.78       Visual Analog Scale (VAS) Questionnaire    VISUAL ANALOG PAIN SCALE 2/16/2023   Back Pain Scale 0-10 3   Right leg pain 0   Left leg pain 0   Neck Pain Scale 0-10 0   Right arm pain 0   Left arm pain 0        PHYSICAL EXAM:  There were no vitals taken for this visit.     Constitutional: Patient is healthy, well-nourished and appears stated age.    Skin: Incision healing well, no dehiscence, drainage, or erythema.     Gait: Non-antalgic gait without use of assistive devices. Able to tandem gait without difficulty.     Neurologic - Sensation intact to light touch bilaterally. Deep tendon reflexes 2+  bilateral patella.     Musculoskeletal: Strength: 5 out of 5 bilateral hip flexion knee extension dorsiflexion plantarflexion    Spine: Pain to palpation on the right superior cluneal nerve, also at PSIS.  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     XR lumbar 2/16/2023    No concerns to instrumentation.  No fractures to screws.  Mild L1-L2 proximal junction kyphosis unchanged from previous imaging 1 year ago.        XR Pelvis    Mild sclerosis around the right triangular implants.  No abnormal positioning of SI instrumentation        CLINICAL ASSESSMENT:   1. 77 year old female 3 years status post L1 fusion extension, L4 PSO, L1-L2 TLIF and SPO    DISCUSSION/PLAN:   She is pleased with surgical outcome so far.  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  She also has a right piriformis syndrome that has been improving with her Botox injections with Dr. Medina.  Educated that we will plan on reaching out to Dr. Medina for discussion for interventions to her right superior cluneal nerve neuropathy.  PM&R referral placed to Dr. Medina and he was reached out via inbox.  Plan to follow-up in 1 year    Pleased with surgical outcome so far.    - RTC in 1 year with EOS full spine and XR Pelvis  - PM&R referral for right superior cluneal nerve neuropathy.     All questions and concerns were answered to the patient's apparent satisfaction before leaving the clinic. The plan was reviewed with Dr. Samuels who reviewed the imaging and also saw the patient.     Thank you for allowing Dr. Samuels and I to participate in the care of Carmelita Mina.    Respectfully,  Bishop TANIKA Macdonald PA-C     I saw and evaluated the patient and developed the plan.  I independently interpreted her EOS and pelvis x-rays. I determined her cluneal nerve issue. I  explained the possible treatment strategies.  Segundo Samuels MD      CC  Copy to patient    [address]  [facility] [address]

## 2023-02-16 NOTE — NURSING NOTE
Reason For Visit:   Chief Complaint   Patient presents with     RECHECK     DOS 1-7-20 Revision Lumbar Fusion for Pseudo & Broken Instrumentation & Flatback         Primary MD: Salvatore Rios  Ref. MD: Est    ?  No  Occupation Retired     Date of surgery & Surgery:     1/7/2020 Posterior spinal fusion L-2.   Pedicle subtraction osteotomy L4.  Lorenzo-Flowers osteotomy L1-2. Transforaminal lumbar interbody fusion L1-L2. Insertion structural intervertebral device L1-2.  Spinal instrumentation L1. Reinsertion spinal instrumentation L2-S1. Pelvic fixation Image-guided surgery. Bilateral sacroiliac joint fusion     11/2017 L4 or L5 Alonzo revision and new hardware  3/2016  Rods place in L3 or L4 to S1     Smoker: No  Request smoking cessation information: No    There were no vitals taken for this visit.    Pain Assessment  Patient Currently in Pain: Yes  0-10 Pain Scale: 3  Primary Pain Location: Back    Oswestry (IAM) Questionnaire    OSWESTRY DISABILITY INDEX 2/9/2023   Count 9   Sum 17   Oswestry Score (%) 37.78            Neck Disability Index (NDI) Questionnaire    No flowsheet data found.           Visual Analog Pain Scale  Back Pain Scale 0-10: 3  Right leg pain: 0  Left leg pain: 0  Neck Pain Scale 0-10: 0  Right arm pain: 0  Left arm pain: 0    Promis 10 Assessment    PROMIS 10 2/15/2023   In general, would you say your health is: Fair   In general, would you say your quality of life is: Good   In general, how would you rate your physical health? Good   In general, how would you rate your mental health, including your mood and your ability to think? Good   In general, how would you rate your satisfaction with your social activities and relationships? Good   In general, please rate how well you carry out your usual social activities and roles Very good   To what extent are you able to carry out your everyday physical activities such as walking, climbing stairs, carrying groceries, or moving a chair?  Mostly   How often have you been bothered by emotional problems such as feeling anxious, depressed or irritable? Never   How would you rate your fatigue on average? Mild   How would you rate your pain on average?   0 = No Pain  to  10 = Worst Imaginable Pain 4   In general, would you say your health is: 2   In general, would you say your quality of life is: 3   In general, how would you rate your physical health? 3   In general, how would you rate your mental health, including your mood and your ability to think? 3   In general, how would you rate your satisfaction with your social activities and relationships? 3   In general, please rate how well you carry out your usual social activities and roles. (This includes activities at home, at work and in your community, and responsibilities as a parent, child, spouse, employee, friend, etc.) 4   To what extent are you able to carry out your everyday physical activities such as walking, climbing stairs, carrying groceries, or moving a chair? 4   In the past 7 days, how often have you been bothered by emotional problems such as feeling anxious, depressed, or irritable? 1   In the past 7 days, how would you rate your fatigue on average? 2   In the past 7 days, how would you rate your pain on average, where 0 means no pain, and 10 means worst imaginable pain? 4   Global Mental Health Score 14   Global Physical Health Score 14   PROMIS TOTAL - SUBSCORES 28   Some recent data might be hidden                Loretta Brennan LPN

## 2023-03-02 ENCOUNTER — MYC MEDICAL ADVICE (OUTPATIENT)
Dept: PHYSICAL MEDICINE AND REHAB | Facility: CLINIC | Age: 78
End: 2023-03-02
Payer: MEDICARE

## 2023-03-20 NOTE — TELEPHONE ENCOUNTER
Patient was scheduled for the appt with Dr. Medina for new patient visit or her Right cluneal nerve neuropathy, she will see him at [place] on 3/31/23.    HELDER Hawthorne RN Care Coordinator  [facility]   [facility] main phone # [phone] fax # [phone]

## 2023-03-31 ENCOUNTER — OFFICE VISIT (OUTPATIENT)
Dept: NEUROSURGERY | Facility: CLINIC | Age: 78
End: 2023-03-31
Payer: MEDICARE

## 2023-03-31 VITALS
DIASTOLIC BLOOD PRESSURE: 74 MMHG | SYSTOLIC BLOOD PRESSURE: 108 MMHG | WEIGHT: 204.7 LBS | BODY MASS INDEX: 37.19 KG/M2 | HEART RATE: 67 BPM

## 2023-03-31 DIAGNOSIS — Z98.1 S/P SPINAL FUSION: ICD-10-CM

## 2023-03-31 DIAGNOSIS — G58.8 CLUNEAL NEUROPATHY: Primary | ICD-10-CM

## 2023-03-31 DIAGNOSIS — G57.01 PIRIFORMIS SYNDROME OF RIGHT SIDE: ICD-10-CM

## 2023-03-31 DIAGNOSIS — M79.18 PIRIFORMIS MUSCLE PAIN: ICD-10-CM

## 2023-03-31 PROCEDURE — 99215 OFFICE O/P EST HI 40 MIN: CPT | Performed by: PHYSICAL MEDICINE & REHABILITATION

## 2023-03-31 ASSESSMENT — PAIN SCALES - GENERAL: PAINLEVEL: MILD PAIN (3)

## 2023-03-31 NOTE — LETTER
3/31/2023         RE: Carmelita Mina  [address] [facility] [address]        Dear Colleague,    Thank you for referring your patient, Carmelita Mina, to the [facility] [place]. Please see a copy of my visit note below.    PM&R Follow-Up Visit -     Date of Initial Visit: 04/26/2021  LOV: 2/13/23  TD: 3/31/2023     Recall: Carmelita Mina is a 77 year old female with history of L1 fusion extension, L4 PSO , L1-L2 TLIF and SPO , reinsertion of instrumentation from L2-S1, bilateral pelvic fixation of the her SI joints who follows up for right>left (80:20%) lumbar radicular pain, piriformis syndrome and repeat Botox injection    INTERVAL HISTORY:  Patient was last seen in clinic February 13, 2023.  At that visit, she underwent repeat Botox injection for the right piriformis muscle.  She has had quite good relief +80% improvement with ongoing injections.  These injections that will last around 2-1/2 months and her symptoms tend to increase.  However, the injections allow improvement in pain, function, and quality of life and she is overall quite pleased with results.  She subsequently saw Dr. Segundo Samuels in orthopedic spine surgery clinic February 16, 2023.  At that visit, they had suggested reevaluation/rediscussion with our PM&R clinic with regards to potential cluneal neuropathy and consideration of cluneal nerve block.  Patient returns today to discuss further.    Currently, she localizes pain and symptoms to the right gluteal region, along the piriformis region which is treated well following piriformis injection.  She states that she also has a seemingly separate area of pain along the posterior iliac crest and superolateral gluteal region/buttock region.  The symptoms tend to be more persistent and bothersome (or at least more noticeable) around 2-2.5 months after her Botox injections. Mild Pain (3)/10 today.     She continues water aerobics x2 weekly      PRIOR INJURIES/TREATMENT:   Physical Therapy:   Multiple courses of prior PT.   Completed PT in Spring 2021 closer to home for Piriformis program.    Continues Water aerobics through [employer] twice weekly      - Current Pain Medications -   Ibuprofen   Gabapentin      Prior Procedures:  Multiple prior lumbar interventional pain procedures prior to surgery                                     Date                 Procedure                    Improvement (%)  04/26/21          R Piriformis CSI (US)   75-80% ~6weeks  08/19/21 R Piriformis Botox (US) 100% x2.5mos then 75%  11/18/21 R Piriformis Botox (US) +80% x2.5mos  02/17/22 R Piriformis Botox (US) +80% x2.5mos  05/26/22 R Piriformis Botox (US) +80% x2.5mos  08/25/22 R Piriformis Botox (US) +80% x2.5mos  11/21/22 R Piriformis Botox (US) +80% x2.5mos  02/13/23 R Piriformis Botox (US) +80% x2.5mos     Prior Related Surgery per Orthopedic spine surgery documentation:  1/7/2020 - Posterior spinal fusion L-2.   Pedicle subtraction osteotomy L4.  Lorenzo-Flowers osteotomy L1-2. Transforaminal lumbar interbody fusion L1-L2. Insertion structural intervertebral device L1-2.  Spinal instrumentation L1. Reinsertion spinal instrumentation L2-S1. Pelvic fixation Image-guided surgery. Bilateral sacroiliac joint fusion   11/2017 - L4 or L5 Alonzo revision and new hardware  03/2016 - Rods place in L3 or L4 to S1     Specialists Seen - (with most recent, available notes and clinic visits reviewed)   1. Orthopedic spine surgery - Dr. Segundo Samuels     Past Medical History:  She  has a past medical history of Asthma, Back pain, Flatback syndrome, GERD (gastroesophageal reflux disease), H/O systemic lupus erythematosus (SLE) (H), HLD (hyperlipidemia), HTN (hypertension), and Pseudoarthrosis of lumbar spine.     She  has a past surgical history that includes laparoscopic cholecystectomy (1985); laparoscopic appendectomy (1985); LAPAROTOMY UTERUS FIBROID TUMOR RESECTION/MYOMECTOMY (1985);  Hysterectomy (1985); LAPAROSCOPY TUBAL LIGATION; Bunionectomy (Bilateral); Dental surgery; sinus surgery (1988); L2-5 TRANSFORAMINAL LUMBAR INTERBODY FUSION L5-S1 POSTERIOR LUMBAR INTERBODY FUSION (03/08/2016); L5-S1 pseudoarthrosis revision with BMP and iliac screws (11/07/2017); Optical tracking system fusion spine posterior lumbar three+ levels (N/A, 1/7/2020); and Optical tracking system fusion sacral iliac (N/A, 1/7/2020).       Family History  Her family history includes Cardiovascular in her mother and sister; Cerebrovascular Disease in her mother.      Social History:  She  reports that she has never smoked. She has never used smokeless tobacco. She reports current alcohol use. She reports that she does not use drugs.     Medications:   She has a current medication list which includes the following prescription(s): atorvastatin, furosemide, gabapentin, multivital, multiple vitamins-minerals, pantoprazole, valacyclovir, melatonin, and meloxicam.      Physical Exam:   /74 (BP Location: Right arm, Patient Position: Sitting, Cuff Size: Adult Regular)   Pulse 67   Wt 92.9 kg (204 lb 11.2 oz)   BMI 37.19 kg/m     General: Pleasant, straightforward, WDWN individual.  Mental Status: Pleasant, direct, appropriate mood and affect  Resp: breathing is unlabored without audible wheeze  Vascular: No visible cyanosis, no venous stasis changes  Heme: No visible ecchymosis or erythema on extremities  Skin: No notable rash    Musculoskeletal:  Pos Tinel's at right iliac crest and mildly tender to palpation at right G max/ Gmed.   No significant greater  trochanteric bursa tenderness.     ASSESSMENT:   Carmelita Mina is a very pleasant 77 year old female who follows up for:    #. Right cluneal neuropathy   #. Bilateral Piriformis syndrome R>L (80:20%). 75-80% improvement in symptoms following R-sided piriformis muscle corticosteroid inj (04/26/21) and 80% improved after repeated Botox injections with pain  reduction and imp function  #. Piriformis muscle pain  #. Radicular pain of lumbosacral region  #. Spasm of muscle  #. IT band syndrome on the right   #. S/P L2-pelvis fusion with pedicle substraction osteotomy and bedrock fixation of the her bilateral SI joints. Last revision 01/2020 and doing significantly better.     PLAN:   - Repeat sonographically-guided right superior cluneal nerve block  - Can also consider middle cluneal nerve block  - Depending on her response, we briefly discussed the possibility of peripheral nerve stimulator, we specifically discussed SPR PNS 60 implantable device.  - RTC for procedure.     Ready to learn, no apparent learning barriers.  Education provided on treatment plan according to patient's preferred learning style.  Patient verbalizes understanding.     ________________________________   Veronica Medina MD  Department of Physical Medicine & Rehabilitation       40 minutes spent by me on the date of the encounter doing chart review, history and exam, documentation and further activities per the note       Again, thank you for allowing me to participate in the care of your patient.        Sincerely,        Veronica Medina MD

## 2023-03-31 NOTE — PROGRESS NOTES
PM&R Follow-Up Visit -     Date of Initial Visit: 04/26/2021  LOV: 2/13/23  TD: 3/31/2023     Recall: Carmelita Mina is a 77 year old female with history of L1 fusion extension, L4 PSO , L1-L2 TLIF and SPO , reinsertion of instrumentation from L2-S1, bilateral pelvic fixation of the her SI joints who follows up for right>left (80:20%) lumbar radicular pain, piriformis syndrome and repeat Botox injection    INTERVAL HISTORY:  Patient was last seen in clinic February 13, 2023.  At that visit, she underwent repeat Botox injection for the right piriformis muscle.  She has had quite good relief +80% improvement with ongoing injections.  These injections that will last around 2-1/2 months and her symptoms tend to increase.  However, the injections allow improvement in pain, function, and quality of life and she is overall quite pleased with results.  She subsequently saw Dr. Segundo Samuels in orthopedic spine surgery clinic February 16, 2023.  At that visit, they had suggested reevaluation/rediscussion with our PM&R clinic with regards to potential cluneal neuropathy and consideration of cluneal nerve block.  Patient returns today to discuss further.    Currently, she localizes pain and symptoms to the right gluteal region, along the piriformis region which is treated well following piriformis injection.  She states that she also has a seemingly separate area of pain along the posterior iliac crest and superolateral gluteal region/buttock region.  The symptoms tend to be more persistent and bothersome (or at least more noticeable) around 2-2.5 months after her Botox injections. Mild Pain (3)/10 today.     She continues water aerobics x2 weekly     PRIOR INJURIES/TREATMENT:   Physical Therapy:   Multiple courses of prior PT.   Completed PT in Spring 2021 closer to home for Piriformis program.    Continues Water aerobics through [facility] twice weekly      - Current Pain Medications -   Ibuprofen   Gabapentin      Prior  Procedures:  Multiple prior lumbar interventional pain procedures prior to surgery                                     Date                 Procedure                    Improvement (%)  04/26/21          R Piriformis CSI (US)   75-80% ~6weeks  08/19/21 R Piriformis Botox (US) 100% x2.5mos then 75%  11/18/21 R Piriformis Botox (US) +80% x2.5mos  02/17/22 R Piriformis Botox (US) +80% x2.5mos  05/26/22 R Piriformis Botox (US) +80% x2.5mos  08/25/22 R Piriformis Botox (US) +80% x2.5mos  11/21/22 R Piriformis Botox (US) +80% x2.5mos  02/13/23 R Piriformis Botox (US) +80% x2.5mos     Prior Related Surgery per Orthopedic spine surgery documentation:  1/7/2020 - Posterior spinal fusion L-2.   Pedicle subtraction osteotomy L4.  Lorenzo-Flowers osteotomy L1-2. Transforaminal lumbar interbody fusion L1-L2. Insertion structural intervertebral device L1-2.  Spinal instrumentation L1. Reinsertion spinal instrumentation L2-S1. Pelvic fixation Image-guided surgery. Bilateral sacroiliac joint fusion   11/2017 - L4 or L5 Alonzo revision and new hardware  03/2016 - Rods place in L3 or L4 to S1     Specialists Seen - (with most recent, available notes and clinic visits reviewed)   1. Orthopedic spine surgery - Dr. Segundo Samuels     Past Medical History:  She  has a past medical history of Asthma, Back pain, Flatback syndrome, GERD (gastroesophageal reflux disease), H/O systemic lupus erythematosus (SLE) (H), HLD (hyperlipidemia), HTN (hypertension), and Pseudoarthrosis of lumbar spine.     She  has a past surgical history that includes laparoscopic cholecystectomy (1985); laparoscopic appendectomy (1985); LAPAROTOMY UTERUS FIBROID TUMOR RESECTION/MYOMECTOMY (1985); Hysterectomy (1985); LAPAROSCOPY TUBAL LIGATION; Bunionectomy (Bilateral); Dental surgery; sinus surgery (1988); L2-5 TRANSFORAMINAL LUMBAR INTERBODY FUSION L5-S1 POSTERIOR LUMBAR INTERBODY FUSION (03/08/2016); L5-S1 pseudoarthrosis revision with BMP and iliac screws (11/07/2017);  Optical tracking system fusion spine posterior lumbar three+ levels (N/A, 1/7/2020); and Optical tracking system fusion sacral iliac (N/A, 1/7/2020).       Family History  Her family history includes Cardiovascular in her mother and sister; Cerebrovascular Disease in her mother.      Social History:  She  reports that she has never smoked. She has never used smokeless tobacco. She reports current alcohol use. She reports that she does not use drugs.     Medications:   She has a current medication list which includes the following prescription(s): atorvastatin, furosemide, gabapentin, multivital, multiple vitamins-minerals, pantoprazole, valacyclovir, melatonin, and meloxicam.      Physical Exam:   /74 (BP Location: Right arm, Patient Position: Sitting, Cuff Size: Adult Regular)   Pulse 67   Wt 92.9 kg (204 lb 11.2 oz)   BMI 37.19 kg/m     General: Pleasant, straightforward, WDWN individual.  Mental Status: Pleasant, direct, appropriate mood and affect  Resp: breathing is unlabored without audible wheeze  Vascular: No visible cyanosis, no venous stasis changes  Heme: No visible ecchymosis or erythema on extremities  Skin: No notable rash    Musculoskeletal:  Pos Tinel's at right iliac crest and mildly tender to palpation at right G max/ Gmed.   No significant greater  trochanteric bursa tenderness.     ASSESSMENT:   Carmelita Mina is a very pleasant 77 year old female who follows up for:    #. Right cluneal neuropathy   #. Bilateral Piriformis syndrome R>L (80:20%). 75-80% improvement in symptoms following R-sided piriformis muscle corticosteroid inj (04/26/21) and 80% improved after repeated Botox injections with pain reduction and imp function  #. Piriformis muscle pain  #. Radicular pain of lumbosacral region  #. Spasm of muscle  #. IT band syndrome on the right   #. S/P L2-pelvis fusion with pedicle substraction osteotomy and bedrock fixation of the her bilateral SI joints. Last revision 01/2020 and  doing significantly better.     PLAN:   - Repeat sonographically-guided right superior cluneal nerve block  - Can also consider middle cluneal nerve block  - Depending on her response, we briefly discussed the possibility of peripheral nerve stimulator, we specifically discussed SPR PNS 60 implantable device.  - RTC for procedure.     Ready to learn, no apparent learning barriers.  Education provided on treatment plan according to patient's preferred learning style.  Patient verbalizes understanding.     ________________________________   Veronica Medina MD  Department of Physical Medicine & Rehabilitation       40 minutes spent by me on the date of the encounter doing chart review, history and exam, documentation and further activities per the note

## 2023-05-08 ENCOUNTER — OFFICE VISIT (OUTPATIENT)
Dept: PHYSICAL MEDICINE AND REHAB | Facility: CLINIC | Age: 78
End: 2023-05-08
Payer: MEDICARE

## 2023-05-08 VITALS
TEMPERATURE: 98 F | HEART RATE: 68 BPM | SYSTOLIC BLOOD PRESSURE: 128 MMHG | DIASTOLIC BLOOD PRESSURE: 85 MMHG | RESPIRATION RATE: 16 BRPM | OXYGEN SATURATION: 98 %

## 2023-05-08 DIAGNOSIS — G58.8 CLUNEAL NEUROPATHY: Primary | ICD-10-CM

## 2023-05-08 DIAGNOSIS — Z98.1 S/P SPINAL FUSION: ICD-10-CM

## 2023-05-08 PROCEDURE — 64450 NJX AA&/STRD OTHER PN/BRANCH: CPT | Mod: RT | Performed by: PHYSICAL MEDICINE & REHABILITATION

## 2023-05-08 PROCEDURE — 76942 ECHO GUIDE FOR BIOPSY: CPT | Mod: 26 | Performed by: PHYSICAL MEDICINE & REHABILITATION

## 2023-05-08 RX ORDER — TRIAMCINOLONE ACETONIDE 40 MG/ML
40 INJECTION, SUSPENSION INTRA-ARTICULAR; INTRAMUSCULAR ONCE
Status: COMPLETED | OUTPATIENT
Start: 2023-05-08 | End: 2023-05-08

## 2023-05-08 RX ORDER — BUPIVACAINE HYDROCHLORIDE 2.5 MG/ML
4 INJECTION, SOLUTION INFILTRATION; PERINEURAL ONCE
Status: COMPLETED | OUTPATIENT
Start: 2023-05-08 | End: 2023-05-08

## 2023-05-08 RX ADMIN — BUPIVACAINE HYDROCHLORIDE 10 MG: 2.5 INJECTION, SOLUTION INFILTRATION; PERINEURAL at 15:34

## 2023-05-08 RX ADMIN — TRIAMCINOLONE ACETONIDE 40 MG: 40 INJECTION, SUSPENSION INTRA-ARTICULAR; INTRAMUSCULAR at 15:35

## 2023-05-08 ASSESSMENT — PAIN SCALES - GENERAL: PAINLEVEL: MODERATE PAIN (4)

## 2023-05-08 NOTE — PROGRESS NOTES
PROCEDURE NOTE: Superior Cluneal Nerve Block Under Ultrasound Guidance    PROCEDURE DATE: 5/8/2023    PATIENT NAME: Carmelita Mina  YOB: 1945    ATTENDING PHYSICIAN: Veronica Medina MD  FELLOW/RESIDENT PHYSICIAN: None    PREOPERATIVE DIAGNOSIS:   Cluneal neuropathy  (primary encounter diagnosis)  S/P spinal fusion    POSTOPERATIVE DIAGNOSIS: same    PROCEDURE PERFORMED: RIGHT Superior Cluneal Nerve Block Under Ultrasound Guidance    ULTRASOUND WAS USED.    INDICATIONS FOR THE PROCEDURE:  Carmelita Mina is a 77 year old female who presents with chronic right gluteal pain in the setting of multiple prior lumbosacral spinal surgeries and clinical presentation suggestive of superior cluneal neuropathy     PROCEDURE AND FINDINGS:  She was greeted in the clinic. The risk, benefits and alternatives to the procedure were again reviewed with her and informed consent was obtained and the patient agreed to proceed. A time-out was performed. Following review alternatives, benefits and risks, the procedure was carried out under sterile prep with sterile gel. The use of direct sonographic guidance was used to ensure accurate placement of the needle (rather than non-guided injection) and required to minimize the risk of bleeding or injury to nearby neurovascular structures. Images were recorded and stored.     A 5-1MHz ultrasound transducer was used to visualize the relevant structures and determine the optimal needle path for the procedure.  2mL 1% lidocaine was infiltrated at the needle insertion site subcutaneously. Then, a 22 gauge 5--inch needle was advanced from lateral to medial utilizing an in-plane approach, under continuous ultrasound guidance to the right superior cluneal nerve space. After negative aspiration, slow injection of the treatment solution 5mL total consisting of 1mL Kenalog (40mg/mL) and 4mL of 0.25% Bupivacaine was instilled into affected area. The tip of the needle was visualized  throughout the procedure. The remainder of the single-use vials were discarded.      She tolerated the procedure well, was discharged home in stable condition.     Before the procedure, she reported a pain score of 4/10.   After the procedure, she reported a pain score of 2/10.      Follow-up will be in clinic     COMPLICATIONS: None    COMMENTS: None

## 2023-05-08 NOTE — LETTER
5/8/2023       RE: Carmelita Mina  [address] [facility] [address]       Dear Colleague,    Thank you for referring your patient, Carmelita Mina, to the [facility] PHYSICAL MEDICINE AND REHABILITATION CLINIC [place] at [facility]. Please see a copy of my visit note below.    PROCEDURE NOTE: Superior Cluneal Nerve Block Under Ultrasound Guidance    PROCEDURE DATE: 5/8/2023    PATIENT NAME: Carmelita Mina  YOB: 1945    ATTENDING PHYSICIAN: Veronica Medina MD  FELLOW/RESIDENT PHYSICIAN: None    PREOPERATIVE DIAGNOSIS:   Cluneal neuropathy  (primary encounter diagnosis)  S/P spinal fusion    POSTOPERATIVE DIAGNOSIS: same    PROCEDURE PERFORMED: RIGHT Superior Cluneal Nerve Block Under Ultrasound Guidance    ULTRASOUND WAS USED.    INDICATIONS FOR THE PROCEDURE:  Carmelita Mina is a 77 year old female who presents with chronic right gluteal pain in the setting of multiple prior lumbosacral spinal surgeries and clinical presentation suggestive of superior cluneal neuropathy     PROCEDURE AND FINDINGS:  She was greeted in the clinic. The risk, benefits and alternatives to the procedure were again reviewed with her and informed consent was obtained and the patient agreed to proceed. A time-out was performed. Following review alternatives, benefits and risks, the procedure was carried out under sterile prep with sterile gel. The use of direct sonographic guidance was used to ensure accurate placement of the needle (rather than non-guided injection) and required to minimize the risk of bleeding or injury to nearby neurovascular structures. Images were recorded and stored.     A 5-1MHz ultrasound transducer was used to visualize the relevant structures and determine the optimal needle path for the procedure.  2mL 1% lidocaine was infiltrated at the needle insertion site subcutaneously. Then, a 22 gauge 5--inch needle was advanced  from lateral to medial utilizing an in-plane approach, under continuous ultrasound guidance to the right superior cluneal nerve space. After negative aspiration, slow injection of the treatment solution 5mL total consisting of 1mL Kenalog (40mg/mL) and 4mL of 0.25% Bupivacaine was instilled into affected area. The tip of the needle was visualized throughout the procedure. The remainder of the single-use vials were discarded.      She tolerated the procedure well, was discharged home in stable condition.     Before the procedure, she reported a pain score of 4/10.   After the procedure, she reported a pain score of 2/10.      Follow-up will be in clinic     COMPLICATIONS: None    COMMENTS: None        Again, thank you for allowing me to participate in the care of your patient.      Sincerely,    Veronica Medina MD

## 2023-05-15 ENCOUNTER — OFFICE VISIT (OUTPATIENT)
Dept: PHYSICAL MEDICINE AND REHAB | Facility: CLINIC | Age: 78
End: 2023-05-15
Payer: MEDICARE

## 2023-05-15 VITALS — OXYGEN SATURATION: 97 % | SYSTOLIC BLOOD PRESSURE: 101 MMHG | HEART RATE: 67 BPM | DIASTOLIC BLOOD PRESSURE: 60 MMHG

## 2023-05-15 DIAGNOSIS — M62.838 MUSCLE SPASM: Primary | ICD-10-CM

## 2023-05-15 DIAGNOSIS — M62.838 SPASM OF MUSCLE: ICD-10-CM

## 2023-05-15 DIAGNOSIS — Z98.1 S/P SPINAL FUSION: ICD-10-CM

## 2023-05-15 DIAGNOSIS — G57.01 PIRIFORMIS SYNDROME OF RIGHT SIDE: ICD-10-CM

## 2023-05-15 DIAGNOSIS — M79.18 PIRIFORMIS MUSCLE PAIN: ICD-10-CM

## 2023-05-15 DIAGNOSIS — M62.838 OTHER MUSCLE SPASM: ICD-10-CM

## 2023-05-15 DIAGNOSIS — M43.28 FUSION OF SACRAL REGION OF SPINE: ICD-10-CM

## 2023-05-15 PROCEDURE — 76942 ECHO GUIDE FOR BIOPSY: CPT | Mod: 26 | Performed by: PHYSICAL MEDICINE & REHABILITATION

## 2023-05-15 PROCEDURE — 64642 CHEMODENERV 1 EXTREMITY 1-4: CPT | Performed by: PHYSICAL MEDICINE & REHABILITATION

## 2023-05-15 NOTE — LETTER
5/15/2023       RE: Carmelita Mina  [address] [facility] [address]       Dear Colleague,    Thank you for referring your patient, Carmelita Mina, to the [facility] PHYSICAL MEDICINE AND REHABILITATION CLINIC [place] at [facility]. Please see a copy of my visit note below.    PROCEDURE NOTE: Piriformis Muscle Botulinum Toxin-A (Botox) Injection Under Ultrasound Guidance    PROCEDURE DATE: 5/15/2023     PATIENT NAME: Carmelita Mian  YOB: 1945    ATTENDING PHYSICIAN: Veronica Medina MD  FELLOW/RESIDENT PHYSICIAN: None     PREOPERATIVE DIAGNOSIS:   Muscle spasm  (primary encounter diagnosis)  Piriformis syndrome of right side  Piriformis muscle pain  S/P spinal fusion  Fusion of sacral region of spine    OSTOPERATIVE DIAGNOSIS: same    PROCEDURE PERFORMED: Right Piriformis Muscle Botox Injection Under Ultrasound Guidance    ULTRASOUND WAS USED.    INDICATIONS FOR THE PROCEDURE:  Carmelita Mina is a 77 year old female who presents with piriformis syndrome and muscle spasm.     PROCEDURE AND FINDINGS:  She was greeted in the clinic. The risk, benefits and alternatives to the procedure were again reviewed with her and informed consent was obtained and the patient agreed to proceed. A time-out was performed. Following review alternatives, benefits and risks, the procedure was carried out under sterile prep with sterile gel. The use of direct sonographic guidance was used to ensure accurate placement of the needle (rather than non-guided injection) and required to minimize the risk of bleeding or injury to nearby neurovascular structures. Images were recorded and stored.     A 5-1MHz ultrasound transducer was used to visualize the relevant structures and determine the optimal needle path for the procedure.  2mL 1% lidocaine was infiltrated at the needle insertion site subcutaneously. Then, a 22 gauge 5-inch Quincke spinal  needle was advanced from lateral to medial utilizing an in-plane approach in long axis, under continuous ultrasound guidance to the piriformis muscle on the right. After negative aspiration, slow injection of the treatment solution 2mL total consisting of 100 units Botox reconstituted in 2mL of preservative free normal saline was instilled into affected area and distributed along 2 locations of the muscle. The tip of the needle was visualized throughout the procedure. The remainder of the single-use vials were discarded (0 units Botox wasted).      She tolerated the procedure well, was discharged home in stable condition.     Follow-up will be in clinic x3 months for repeat injection.     COMPLICATIONS: None    COMMENTS: Patient was last seen in clinic 05/08/23.  At that visit, she underwent ultrasound-guided right superior cluneal nerve block.  Today, she states that pain in the superior cluneal nerve distribution 0/10 has not improved since the injection.  We will see how she does with regards to some of the relief and duration of benefit - she will update me via patient portal/[facility] or at our next office visit.           Again, thank you for allowing me to participate in the care of your patient.      Sincerely,    Veronica Medina MD

## 2023-05-15 NOTE — PROGRESS NOTES
PROCEDURE NOTE: Piriformis Muscle Botulinum Toxin-A (Botox) Injection Under Ultrasound Guidance    PROCEDURE DATE: 5/15/2023     PATIENT NAME: Carmelita Mina  YOB: 1945    ATTENDING PHYSICIAN: Veronica Medina MD  FELLOW/RESIDENT PHYSICIAN: None     PREOPERATIVE DIAGNOSIS:   Muscle spasm  (primary encounter diagnosis)  Piriformis syndrome of right side  Piriformis muscle pain  S/P spinal fusion  Fusion of sacral region of spine    OSTOPERATIVE DIAGNOSIS: same    PROCEDURE PERFORMED: Right Piriformis Muscle Botox Injection Under Ultrasound Guidance    ULTRASOUND WAS USED.    INDICATIONS FOR THE PROCEDURE:  Carmelita Mina is a 77 year old female who presents with piriformis syndrome and muscle spasm.     PROCEDURE AND FINDINGS:  She was greeted in the clinic. The risk, benefits and alternatives to the procedure were again reviewed with her and informed consent was obtained and the patient agreed to proceed. A time-out was performed. Following review alternatives, benefits and risks, the procedure was carried out under sterile prep with sterile gel. The use of direct sonographic guidance was used to ensure accurate placement of the needle (rather than non-guided injection) and required to minimize the risk of bleeding or injury to nearby neurovascular structures. Images were recorded and stored.     A 5-1MHz ultrasound transducer was used to visualize the relevant structures and determine the optimal needle path for the procedure.  2mL 1% lidocaine was infiltrated at the needle insertion site subcutaneously. Then, a 22 gauge 5-inch Quincke spinal needle was advanced from lateral to medial utilizing an in-plane approach in long axis, under continuous ultrasound guidance to the piriformis muscle on the right. After negative aspiration, slow injection of the treatment solution 2mL total consisting of 100 units Botox reconstituted in 2mL of preservative free normal saline was instilled into affected  area and distributed along 2 locations of the muscle. The tip of the needle was visualized throughout the procedure. The remainder of the single-use vials were discarded (0 units Botox wasted).      She tolerated the procedure well, was discharged home in stable condition.     Follow-up will be in clinic x3 months for repeat injection.     COMPLICATIONS: None    COMMENTS: Patient was last seen in clinic 05/08/23.  At that visit, she underwent ultrasound-guided right superior cluneal nerve block.  Today, she states that pain in the superior cluneal nerve distribution 0/10 has not improved since the injection.  We will see how she does with regards to some of the relief and duration of benefit - she will update me via patient portal/[facility] or at our next office visit.

## 2023-06-04 ENCOUNTER — HEALTH MAINTENANCE LETTER (OUTPATIENT)
Age: 78
End: 2023-06-04

## 2023-07-19 ENCOUNTER — MYC MEDICAL ADVICE (OUTPATIENT)
Dept: PHYSICAL MEDICINE AND REHAB | Facility: CLINIC | Age: 78
End: 2023-07-19
Payer: MEDICARE

## 2023-07-24 NOTE — TELEPHONE ENCOUNTER
Responded to other My Chart message that patient sent in that she can plan per Dr. Medina to have both injections completed at her August visit.    Conchita Mejias, BSN RN  RN Care Coordinator for Physical Medicine and Rehabilitation  [facility] [address] [address]  Office: [phone]  Fax: [phone]

## 2023-08-21 ENCOUNTER — OFFICE VISIT (OUTPATIENT)
Dept: PHYSICAL MEDICINE AND REHAB | Facility: CLINIC | Age: 78
End: 2023-08-21
Payer: MEDICARE

## 2023-08-21 VITALS
RESPIRATION RATE: 16 BRPM | HEIGHT: 62 IN | SYSTOLIC BLOOD PRESSURE: 138 MMHG | WEIGHT: 196.1 LBS | BODY MASS INDEX: 36.09 KG/M2 | DIASTOLIC BLOOD PRESSURE: 88 MMHG | HEART RATE: 61 BPM

## 2023-08-21 DIAGNOSIS — M62.838 SPASM OF RIGHT PIRIFORMIS MUSCLE: ICD-10-CM

## 2023-08-21 DIAGNOSIS — G58.8 CLUNEAL NEUROPATHY: ICD-10-CM

## 2023-08-21 DIAGNOSIS — G57.01 PIRIFORMIS SYNDROME OF RIGHT SIDE: ICD-10-CM

## 2023-08-21 DIAGNOSIS — Z98.1 S/P SPINAL FUSION: ICD-10-CM

## 2023-08-21 DIAGNOSIS — M62.838 SPASM OF MUSCLE: Primary | ICD-10-CM

## 2023-08-21 DIAGNOSIS — M62.838 OTHER MUSCLE SPASM: ICD-10-CM

## 2023-08-21 DIAGNOSIS — M79.18 PIRIFORMIS MUSCLE PAIN: ICD-10-CM

## 2023-08-21 PROCEDURE — 64642 CHEMODENERV 1 EXTREMITY 1-4: CPT | Performed by: PHYSICAL MEDICINE & REHABILITATION

## 2023-08-21 PROCEDURE — 76942 ECHO GUIDE FOR BIOPSY: CPT | Performed by: PHYSICAL MEDICINE & REHABILITATION

## 2023-08-21 RX ORDER — TRIAMCINOLONE ACETONIDE 40 MG/ML
40 INJECTION, SUSPENSION INTRA-ARTICULAR; INTRAMUSCULAR ONCE
Status: COMPLETED | OUTPATIENT
Start: 2023-08-21 | End: 2023-08-21

## 2023-08-21 RX ORDER — BUPIVACAINE HYDROCHLORIDE 2.5 MG/ML
4 INJECTION, SOLUTION INFILTRATION; PERINEURAL ONCE
Status: COMPLETED | OUTPATIENT
Start: 2023-08-21 | End: 2023-08-21

## 2023-08-21 RX ADMIN — TRIAMCINOLONE ACETONIDE 40 MG: 40 INJECTION, SUSPENSION INTRA-ARTICULAR; INTRAMUSCULAR at 15:04

## 2023-08-21 RX ADMIN — BUPIVACAINE HYDROCHLORIDE 10 MG: 2.5 INJECTION, SOLUTION INFILTRATION; PERINEURAL at 15:03

## 2023-08-21 ASSESSMENT — PAIN SCALES - GENERAL: PAINLEVEL: NO PAIN (0)

## 2023-08-21 NOTE — NURSING NOTE
Chief Complaint   Patient presents with    Procedure     Here for procedure, confirmed with patient.      Janice Owen

## 2023-08-21 NOTE — LETTER
8/21/2023       RE: Carmelita Mina  [address] [facility] [address]       Dear Colleague,    Thank you for referring your patient, Carmelita Mina, to the [facility] PHYSICAL MEDICINE AND REHABILITATION CLINIC [place] at [facility]. Please see a copy of my visit note below.    PROCEDURE NOTE: Piriformis Muscle Botulinum Toxin-A (Botox) Injection Under Ultrasound Guidance    PROCEDURE DATE: 8/21/2023      PATIENT NAME: Carmelita Mina  YOB: 1945    ATTENDING PHYSICIAN: Veronica Medina MD  FELLOW/RESIDENT PHYSICIAN: None     PREOPERATIVE DIAGNOSIS:   Muscle spasm  (primary encounter diagnosis)  Piriformis syndrome of right side  Piriformis muscle pain  S/P spinal fusion  Fusion of sacral region of spine    OSTOPERATIVE DIAGNOSIS: same    PROCEDURE PERFORMED:   Right Piriformis Muscle Botox Injection Under Ultrasound Guidance  Right Superior Cluneal Nerve Block Under Ultrasound Guidance    ULTRASOUND WAS USED.    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 The risk, benefits and alternatives to the procedure were again reviewed with her and informed consent was obtained and the patient agreed to proceed. A time-out was performed. Following review alternatives, benefits and risks, the procedure was carried out under sterile prep with sterile gel. The use of direct sonographic guidance was used to ensure accurate placement of the needle (rather than non-guided injection) and required to minimize the risk of bleeding or injury to nearby neurovascular structures. Images were recorded and stored.     1.First, a 5-1MHz ultrasound transducer was used to visualize the relevant structures and determine the optimal needle path for the  procedure.  2mL 1% lidocaine was infiltrated at the needle insertion site subcutaneously. Then, a 22 gauge 5-inch Quincke spinal needle was advanced from lateral to medial utilizing an in-plane approach in long axis, under continuous ultrasound guidance to the piriformis muscle on the right. After negative aspiration, slow injection of the treatment solution 2mL total consisting of 100 units Botox reconstituted in 2mL of preservative free normal saline was instilled into affected area and distributed along 2 locations of the muscle.     2.Next, a 5-1MHz ultrasound transducer was used to visualize the relevant structures and determine the optimal needle path for the procedure.  2mL 1% lidocaine was infiltrated at the needle insertion site subcutaneously. Then, a 22 gauge 5--inch needle was advanced from lateral to medial utilizing an in-plane approach, under continuous ultrasound guidance to the right superior cluneal nerve space. After negative aspiration, slow injection of the treatment solution 5mL total consisting of 1mL Kenalog (40mg/mL) and 4mL of 0.25% Bupivacaine was instilled into affected area.     The tip of the needle was visualized throughout the procedure. The remainder of the single-use vials were discarded (0 units Botox wasted).      She tolerated the procedure well, was discharged home in stable condition.     Follow-up will be in clinic x3 months for repeat injection.     COMPLICATIONS: None    COMMENTS:  She notes significant improvement in right gluteal pain following both piriformis muscle Botox injections, as well as, superior cluneal nerve blocks. She was scheduled to have repeat injections 2 weeks ago which had to be rescheduled due to clinic availability, but notes increased burning quality pain in the gluteal region. Continues to endorse improvement in pain and function without any significant notable adverse affects with ongoing injections therapies.         Again, thank you for allowing  me to participate in the care of your patient.      Sincerely,    Veronica Medina MD

## 2023-08-21 NOTE — PROGRESS NOTES
PROCEDURE NOTE: Piriformis Muscle Botulinum Toxin-A (Botox) Injection Under Ultrasound Guidance    PROCEDURE DATE: 8/21/2023      PATIENT NAME: Carmelita Mina  YOB: 1945    ATTENDING PHYSICIAN: Veronica Medina MD  FELLOW/RESIDENT PHYSICIAN: None     PREOPERATIVE DIAGNOSIS:   Muscle spasm  (primary encounter diagnosis)  Piriformis syndrome of right side  Piriformis muscle pain  S/P spinal fusion  Fusion of sacral region of spine    OSTOPERATIVE DIAGNOSIS: same    PROCEDURE PERFORMED:   Right Piriformis Muscle Botox Injection Under Ultrasound Guidance  Right Superior Cluneal Nerve Block Under Ultrasound Guidance    ULTRASOUND WAS USED.    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 The risk, benefits and alternatives to the procedure were again reviewed with her and informed consent was obtained and the patient agreed to proceed. A time-out was performed. Following review alternatives, benefits and risks, the procedure was carried out under sterile prep with sterile gel. The use of direct sonographic guidance was used to ensure accurate placement of the needle (rather than non-guided injection) and required to minimize the risk of bleeding or injury to nearby neurovascular structures. Images were recorded and stored.     1.First, a 5-1MHz ultrasound transducer was used to visualize the relevant structures and determine the optimal needle path for the procedure.  2mL 1% lidocaine was infiltrated at the needle insertion site subcutaneously. Then, a 22 gauge 5-inch Quincke spinal needle was advanced from lateral to medial utilizing an in-plane approach in long axis, under continuous ultrasound guidance to the piriformis muscle on the right. After negative aspiration, slow injection of the treatment  solution 2mL total consisting of 100 units Botox reconstituted in 2mL of preservative free normal saline was instilled into affected area and distributed along 2 locations of the muscle.     2.Next, a 5-1MHz ultrasound transducer was used to visualize the relevant structures and determine the optimal needle path for the procedure.  2mL 1% lidocaine was infiltrated at the needle insertion site subcutaneously. Then, a 22 gauge 5--inch needle was advanced from lateral to medial utilizing an in-plane approach, under continuous ultrasound guidance to the right superior cluneal nerve space. After negative aspiration, slow injection of the treatment solution 5mL total consisting of 1mL Kenalog (40mg/mL) and 4mL of 0.25% Bupivacaine was instilled into affected area.     The tip of the needle was visualized throughout the procedure. The remainder of the single-use vials were discarded (0 units Botox wasted).      She tolerated the procedure well, was discharged home in stable condition.     Follow-up will be in clinic x3 months for repeat injection.     COMPLICATIONS: None    COMMENTS:  She notes significant improvement in right gluteal pain following both piriformis muscle Botox injections, as well as, superior cluneal nerve blocks. She was scheduled to have repeat injections 2 weeks ago which had to be rescheduled due to clinic availability, but notes increased burning quality pain in the gluteal region. Continues to endorse improvement in pain and function without any significant notable adverse affects with ongoing injections therapies.

## 2023-10-31 ENCOUNTER — TELEPHONE (OUTPATIENT)
Dept: PHYSICAL MEDICINE AND REHAB | Facility: CLINIC | Age: 78
End: 2023-10-31
Payer: MEDICARE

## 2023-10-31 NOTE — TELEPHONE ENCOUNTER
Reached out to pt. Unfortunately we cannot schedule same day appt to align with Dr Samuels and Dr Medina as [place] [facility]. Pt understood and agreed to keep appts with Dr Medina as is, for November and February.

## 2023-10-31 NOTE — TELEPHONE ENCOUNTER
"[facility] Call Center    Phone Message    May a detailed message be left on voicemail: yes     Reason for Call: Other: Please see patient's request to reschedule  her current injection scheduled for 2/14/24 as she lives 150 miles away and is trying to coordinate the appointments.    \"I am trying to set a appointment with Dr. Samuels for the date of 2/14.  I have a appointment with Dr. Medina on 2/19 is there any way to have a shot that same week. Tuesday or Thursday would be the best. Thanks Carmelita\"      Action Taken: Message routed to:  [facility] ([facility] PMR    Travel Screening: Not Applicable                                                                   "

## 2023-11-13 ENCOUNTER — OFFICE VISIT (OUTPATIENT)
Dept: PHYSICAL MEDICINE AND REHAB | Facility: CLINIC | Age: 78
End: 2023-11-13
Payer: MEDICARE

## 2023-11-13 DIAGNOSIS — M62.838 OTHER MUSCLE SPASM: ICD-10-CM

## 2023-11-13 DIAGNOSIS — M62.838 SPASM OF MUSCLE: Primary | ICD-10-CM

## 2023-11-13 DIAGNOSIS — M62.838 MUSCLE SPASM: ICD-10-CM

## 2023-11-13 DIAGNOSIS — M62.838 SPASM OF RIGHT PIRIFORMIS MUSCLE: ICD-10-CM

## 2023-11-13 DIAGNOSIS — G58.8 CLUNEAL NEUROPATHY: ICD-10-CM

## 2023-11-13 DIAGNOSIS — M79.18 PIRIFORMIS MUSCLE PAIN: ICD-10-CM

## 2023-11-13 DIAGNOSIS — Z98.1 S/P SPINAL FUSION: ICD-10-CM

## 2023-11-13 DIAGNOSIS — M43.28 FUSION OF SACRAL REGION OF SPINE: ICD-10-CM

## 2023-11-13 PROCEDURE — 64646 CHEMODENERV TRUNK MUSC 1-5: CPT | Performed by: PHYSICAL MEDICINE & REHABILITATION

## 2023-11-13 PROCEDURE — 76942 ECHO GUIDE FOR BIOPSY: CPT | Performed by: PHYSICAL MEDICINE & REHABILITATION

## 2023-11-13 RX ORDER — HYDROCODONE BITARTRATE AND ACETAMINOPHEN 5; 325 MG/1; MG/1
1 TABLET ORAL EVERY 6 HOURS PRN
COMMUNITY

## 2023-11-13 RX ORDER — PREDNISONE 10 MG/1
10 TABLET ORAL DAILY
COMMUNITY

## 2023-11-13 RX ORDER — TRIAMCINOLONE ACETONIDE 40 MG/ML
40 INJECTION, SUSPENSION INTRA-ARTICULAR; INTRAMUSCULAR ONCE
Status: COMPLETED | OUTPATIENT
Start: 2023-11-13 | End: 2023-11-13

## 2023-11-13 RX ORDER — BUPIVACAINE HYDROCHLORIDE 2.5 MG/ML
4 INJECTION, SOLUTION INFILTRATION; PERINEURAL ONCE
Status: COMPLETED | OUTPATIENT
Start: 2023-11-13 | End: 2023-11-27

## 2023-11-13 RX ORDER — CYCLOBENZAPRINE HCL 5 MG
5 TABLET ORAL 3 TIMES DAILY PRN
COMMUNITY

## 2023-11-13 RX ADMIN — TRIAMCINOLONE ACETONIDE 40 MG: 40 INJECTION, SUSPENSION INTRA-ARTICULAR; INTRAMUSCULAR at 16:34

## 2023-11-13 NOTE — LETTER
11/13/2023       RE: Carmelita Mina  [address] [facility] [address]     Dear Colleague,    Thank you for referring your patient, Carmelita Mina, to the [facility] [place] [facility]. Please see a copy of my visit note below.    PROCEDURE NOTE:   Piriformis Muscle Botulinum Toxin-A (Botox) Injection and Cluneal Nerve Block Under Ultrasound Guidance    PROCEDURE DATE: 11/13/2023       PATIENT NAME: Carmelita Mina  YOB: 1945    ATTENDING PHYSICIAN: Veronica Medina MD  FELLOW/RESIDENT PHYSICIAN: None     PREOPERATIVE DIAGNOSIS:   Muscle spasm  (primary encounter diagnosis)  Piriformis syndrome of right side  Piriformis muscle pain  S/P spinal fusion  Fusion of sacral region of spine    OSTOPERATIVE DIAGNOSIS: same    PROCEDURE PERFORMED:   Right Piriformis Muscle Botox Injection Under Ultrasound Guidance  Right Superior Cluneal Nerve Block Under Ultrasound Guidance    ULTRASOUND WAS USED.    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 The risk, benefits and alternatives to the procedure were again reviewed with her and informed consent was obtained and the patient agreed to proceed. A time-out was performed. Following review alternatives, benefits and risks, the procedure was carried out under sterile prep with sterile gel. The use of direct sonographic guidance was used to ensure accurate placement of the needle (rather than non-guided injection) and required to minimize the risk of bleeding or injury to nearby neurovascular structures. Images were recorded and stored.     1.First, a 5-1MHz ultrasound transducer was used to visualize the relevant structures and determine the optimal  needle path for the procedure.  2mL 1% lidocaine was infiltrated at the needle insertion site subcutaneously. Then, a 22 gauge 5-inch Quincke spinal needle was advanced from lateral to medial utilizing an in-plane approach in long axis, under continuous ultrasound guidance to the piriformis muscle on the right. After negative aspiration, slow injection of the treatment solution 2mL total consisting of 100 units Botox reconstituted in 2mL of preservative free normal saline was instilled into affected area and distributed along 2 locations of the muscle.     2.Next, a 5-1MHz ultrasound transducer was used to visualize the relevant structures and determine the optimal needle path for the procedure.  2mL 1% lidocaine was infiltrated at the needle insertion site subcutaneously. Then, a 22 gauge 5--inch needle was advanced from lateral to medial utilizing an in-plane approach, under continuous ultrasound guidance to the right superior cluneal nerve region at the level of the iliac crest. After negative aspiration, slow injection of the treatment solution 5mL total consisting of 1mL Kenalog (40mg/mL) and 4mL of 0.25% Bupivacaine was instilled into affected area.     The tip of the needle was visualized throughout the procedure. The remainder of the single-use vials were discarded (0 units Botox wasted).      She tolerated the procedure well, was discharged home in stable condition.     Follow-up will be in clinic 12-weeks for repeat injection.     COMPLICATIONS: None    COMMENTS:  None      Again, thank you for allowing me to participate in the care of your patient.      Sincerely,    Veronica Medina MD

## 2023-11-13 NOTE — NURSING NOTE
Chief Complaint   Patient presents with    Procedure     Here for injections, confirmed with patient     Janice Owen

## 2023-11-13 NOTE — PROGRESS NOTES
PROCEDURE NOTE:   Piriformis Muscle Botulinum Toxin-A (Botox) Injection and Cluneal Nerve Block Under Ultrasound Guidance    PROCEDURE DATE: 11/13/2023       PATIENT NAME: Carmelita Mina  YOB: 1945    ATTENDING PHYSICIAN: Veronica Medina MD  FELLOW/RESIDENT PHYSICIAN: None     PREOPERATIVE DIAGNOSIS:   Muscle spasm  (primary encounter diagnosis)  Piriformis syndrome of right side  Piriformis muscle pain  S/P spinal fusion  Fusion of sacral region of spine    OSTOPERATIVE DIAGNOSIS: same    PROCEDURE PERFORMED:   Right Piriformis Muscle Botox Injection Under Ultrasound Guidance  Right Superior Cluneal Nerve Block Under Ultrasound Guidance    ULTRASOUND WAS USED.    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 The risk, benefits and alternatives to the procedure were again reviewed with her and informed consent was obtained and the patient agreed to proceed. A time-out was performed. Following review alternatives, benefits and risks, the procedure was carried out under sterile prep with sterile gel. The use of direct sonographic guidance was used to ensure accurate placement of the needle (rather than non-guided injection) and required to minimize the risk of bleeding or injury to nearby neurovascular structures. Images were recorded and stored.     1.First, a 5-1MHz ultrasound transducer was used to visualize the relevant structures and determine the optimal needle path for the procedure.  2mL 1% lidocaine was infiltrated at the needle insertion site subcutaneously. Then, a 22 gauge 5-inch Quincke spinal needle was advanced from lateral to medial utilizing an in-plane approach in long axis, under continuous ultrasound guidance to the piriformis muscle on the right. After negative aspiration, slow  injection of the treatment solution 2mL total consisting of 100 units Botox reconstituted in 2mL of preservative free normal saline was instilled into affected area and distributed along 2 locations of the muscle.     2.Next, a 5-1MHz ultrasound transducer was used to visualize the relevant structures and determine the optimal needle path for the procedure.  2mL 1% lidocaine was infiltrated at the needle insertion site subcutaneously. Then, a 22 gauge 5--inch needle was advanced from lateral to medial utilizing an in-plane approach, under continuous ultrasound guidance to the right superior cluneal nerve region at the level of the iliac crest. After negative aspiration, slow injection of the treatment solution 5mL total consisting of 1mL Kenalog (40mg/mL) and 4mL of 0.25% Bupivacaine was instilled into affected area.     The tip of the needle was visualized throughout the procedure. The remainder of the single-use vials were discarded (0 units Botox wasted).      She tolerated the procedure well, was discharged home in stable condition.     Follow-up will be in clinic 12-weeks for repeat injection.     COMPLICATIONS: None    COMMENTS:  None

## 2023-11-27 RX ADMIN — BUPIVACAINE HYDROCHLORIDE 10 MG: 2.5 INJECTION, SOLUTION INFILTRATION; PERINEURAL at 16:33

## 2024-01-17 DIAGNOSIS — Z98.1 S/P SPINAL FUSION: Primary | ICD-10-CM

## 2024-01-31 ENCOUNTER — ANCILLARY PROCEDURE (OUTPATIENT)
Dept: GENERAL RADIOLOGY | Facility: CLINIC | Age: 79
End: 2024-01-31
Attending: ORTHOPAEDIC SURGERY
Payer: MEDICARE

## 2024-01-31 ENCOUNTER — OFFICE VISIT (OUTPATIENT)
Dept: ORTHOPEDICS | Facility: CLINIC | Age: 79
End: 2024-01-31
Payer: MEDICARE

## 2024-01-31 VITALS — WEIGHT: 183 LBS | BODY MASS INDEX: 34.55 KG/M2 | HEIGHT: 61 IN

## 2024-01-31 DIAGNOSIS — Z98.1 HISTORY OF LUMBAR FUSION: Primary | ICD-10-CM

## 2024-01-31 DIAGNOSIS — Z98.1 S/P SPINAL FUSION: ICD-10-CM

## 2024-01-31 DIAGNOSIS — M62.830 MUSCLE SPASM OF BACK: ICD-10-CM

## 2024-01-31 DIAGNOSIS — M40.30 FLATBACK SYNDROME: ICD-10-CM

## 2024-01-31 DIAGNOSIS — G58.8 CLUNEAL NEUROPATHY: ICD-10-CM

## 2024-01-31 DIAGNOSIS — R26.89 IMBALANCE: ICD-10-CM

## 2024-01-31 PROCEDURE — 99214 OFFICE O/P EST MOD 30 MIN: CPT | Performed by: ORTHOPAEDIC SURGERY

## 2024-01-31 PROCEDURE — 72082 X-RAY EXAM ENTIRE SPI 2/3 VW: CPT | Performed by: STUDENT IN AN ORGANIZED HEALTH CARE EDUCATION/TRAINING PROGRAM

## 2024-01-31 PROCEDURE — 77073 BONE LENGTH STUDIES: CPT | Performed by: STUDENT IN AN ORGANIZED HEALTH CARE EDUCATION/TRAINING PROGRAM

## 2024-01-31 PROCEDURE — 72170 X-RAY EXAM OF PELVIS: CPT | Performed by: RADIOLOGY

## 2024-01-31 NOTE — PROGRESS NOTES
Carmelita returns 4 years out from her pedicle subtraction osteotomy.  She is still swimming a couple times a week.  She does note that she is wearing out her knee and has been for this.  She does note that when she walks she veers to the right and she is little bit concerned about her balance issues.  Oswestry disability index score today is 49%.  Visual analog pain scale is a 4 with low back pain going down into hips and right leg.    Objective: Physical exam reveals a well-developed alert female in no acute distress.  BMI today is 34.  Stance shows that she has a somewhat retroverted pelvis.    Her ear is over her hips and knees.  She is nontender to percussion over her thoracic spine her lumbar spine.  She is nontender over left posterior superior iliac spine.  Mild tenderness over the right posterior superior iliac spine although more tender in the region of the superior cluneal nerve on the right side.  She is tender right quadratus lumborum greater than left.    Imaging: New AP lateral EOS imaging along with a Simmons view are obtained today.  I have independently ordered and interpreted these imaging studies.  I specifically reviewed them with her and with her daughter.  I explained that her proximal wedging is unchanged.          Assessment: Spinal fusion with proximal junctional wedging unchanged.  Slight functional deterioration.  Unclear if spine in etiology or of vestibular/balance in etiology.  Ongoing superior cluneal nerve irritation.  Muscle spasm quadratus lumborum.    Plan: Would like to have her see PT to work on quadratus lumborum stretching.  She also would be a candidate for a PT program for back and balance.  I would like her to be seen by neurology for balance assessment.  She will continue treatment of her superior cluneal nerve with Dr. Medina.  Follow-up in a year or so unless symptoms substantially change.  I pointed out to her that I will be clinically retired at that point but one of my  partners should be able to see her.    My total contact time for this visit including image ordering, image interpretation, face-to-face evaluation, documentation was greater than 30 minutes.    Segundo Samuels MD

## 2024-01-31 NOTE — NURSING NOTE
"Reason For Visit:   Chief Complaint   Patient presents with    RECHECK     RETURN SURGICAL SPINE       Primary MD: Salvatore Rios  Ref. MD: Dr Rios    ?  No  Occupation retireed.  Currently working? No.  Work status?  Retired.  Date of injury: 1/7/20  Type of injury: home.  Date of surgery: 1/7/20  Type of surgery: lumbar fusion.  Smoker: No  Request smoking cessation information: No    Ht 1.556 m (5' 1.25\")   Wt 83 kg (183 lb)   BMI 34.30 kg/m      Pain Assessment  Patient Currently in Pain: Yes  0-10 Pain Scale: 4 (low back pain going into hips and down Rt leg)    Oswestry (IAM) Questionnaire        1/31/2024     1:16 PM   OSWESTRY DISABILITY INDEX   Count 9   Sum 22   Oswestry Score (%) 48.89 %            Neck Disability Index (NDI) Questionnaire         No data to display                       Visual Analog Pain Scale  Back Pain Scale 0-10: 4 (low back pain diane  into hips)  Right leg pain: 4 (pain from hip to knee)  Left leg pain: 0  Neck Pain Scale 0-10: 0  Right arm pain: 0  Left arm pain: 0    Promis 10 Assessment        1/31/2024     1:19 PM   PROMIS 10   In general, would you say your health is: 3   In general, would you say your quality of life is: 3   In general, how would you rate your physical health? 3   In general, how would you rate your mental health, including your mood and your ability to think? 5   In general, how would you rate your satisfaction with your social activities and relationships? 3   In general, please rate how well you carry out your usual social activities and roles. (This includes activities at home, at work and in your community, and responsibilities as a parent, child, spouse, employee, friend, etc.) 3   To what extent are you able to carry out your everyday physical activities such as walking, climbing stairs, carrying groceries, or moving a chair? 3   In the past 7 days, how often have you been bothered by emotional problems such as feeling anxious, " depressed, or irritable? 1   In the past 7 days, how would you rate your fatigue on average? 2   In the past 7 days, how would you rate your pain on average, where 0 means no pain, and 10 means worst imaginable pain? 5   Global Mental Health Score 16   Global Physical Health Score 13   PROMIS TOTAL - SUBSCORES 29                Seven Peña MA

## 2024-01-31 NOTE — LETTER
1/31/2024         RE: Carmelita Mina  [address] [facility] [address]        Dear Colleague,    Thank you for referring your patient, Carmelita Mina, to the [facility] [place]. Please see a copy of my visit note below.    Carmelita returns 4 years out from her pedicle subtraction osteotomy.  She is still swimming a couple times a week.  She does note that she is wearing out her knee and has been for this.  She does note that when she walks she veers to the right and she is little bit concerned about her balance issues.  Oswestry disability index score today is 49%.  Visual analog pain scale is a 4 with low back pain going down into hips and right leg.    Objective: Physical exam reveals a well-developed alert female in no acute distress.  BMI today is 34.  Stance shows that she has a somewhat retroverted pelvis.    Her ear is over her hips and knees.  She is nontender to percussion over her thoracic spine her lumbar spine.  She is nontender over left posterior superior iliac spine.  Mild tenderness over the right posterior superior iliac spine although more tender in the region of the superior cluneal nerve on the right side.  She is tender right quadratus lumborum greater than left.    Imaging: New AP lateral EOS imaging along with a Simmons view are obtained today.  I have independently ordered and interpreted these imaging studies.  I specifically reviewed them with her and with her daughter.  I explained that her proximal wedging is unchanged.          Assessment: Spinal fusion with proximal junctional wedging unchanged.  Slight functional deterioration.  Unclear if spine in etiology or of vestibular/balance in etiology.  Ongoing superior cluneal nerve irritation.  Muscle spasm quadratus lumborum.    Plan: Would like to have her see PT to work on quadratus lumborum stretching.  She also would be a candidate for a PT program for back and balance.  I would like her  to be seen by neurology for balance assessment.  She will continue treatment of her superior cluneal nerve with Dr. Medina.  Follow-up in a year or so unless symptoms substantially change.  I pointed out to her that I will be clinically retired at that point but one of my partners should be able to see her.    My total contact time for this visit including image ordering, image interpretation, face-to-face evaluation, documentation was greater than 30 minutes.    Segundo Samuels MD

## 2024-02-14 ENCOUNTER — MYC MEDICAL ADVICE (OUTPATIENT)
Dept: ORTHOPEDICS | Facility: CLINIC | Age: 79
End: 2024-02-14
Payer: MEDICARE

## 2024-02-19 ENCOUNTER — OFFICE VISIT (OUTPATIENT)
Dept: PHYSICAL MEDICINE AND REHAB | Facility: CLINIC | Age: 79
End: 2024-02-19
Payer: MEDICARE

## 2024-02-19 DIAGNOSIS — M62.838 SPASM OF MUSCLE: ICD-10-CM

## 2024-02-19 DIAGNOSIS — G57.01 PIRIFORMIS SYNDROME OF RIGHT SIDE: ICD-10-CM

## 2024-02-19 DIAGNOSIS — G58.8 CLUNEAL NEUROPATHY: ICD-10-CM

## 2024-02-19 DIAGNOSIS — M62.838 SPASM OF RIGHT PIRIFORMIS MUSCLE: ICD-10-CM

## 2024-02-19 DIAGNOSIS — M62.838 OTHER MUSCLE SPASM: ICD-10-CM

## 2024-02-19 DIAGNOSIS — Z98.1 S/P SPINAL FUSION: ICD-10-CM

## 2024-02-19 DIAGNOSIS — M79.18 PIRIFORMIS MUSCLE PAIN: ICD-10-CM

## 2024-02-19 DIAGNOSIS — M62.838 MUSCLE SPASM: Primary | ICD-10-CM

## 2024-02-19 DIAGNOSIS — M43.28 FUSION OF SACRAL REGION OF SPINE: ICD-10-CM

## 2024-02-19 PROCEDURE — 76942 ECHO GUIDE FOR BIOPSY: CPT | Performed by: PHYSICAL MEDICINE & REHABILITATION

## 2024-02-19 PROCEDURE — 64642 CHEMODENERV 1 EXTREMITY 1-4: CPT | Performed by: PHYSICAL MEDICINE & REHABILITATION

## 2024-02-19 RX ORDER — BUPIVACAINE HYDROCHLORIDE 2.5 MG/ML
4 INJECTION, SOLUTION INFILTRATION; PERINEURAL ONCE
Status: COMPLETED | OUTPATIENT
Start: 2024-02-19 | End: 2024-02-19

## 2024-02-19 RX ORDER — TRIAMCINOLONE ACETONIDE 40 MG/ML
40 INJECTION, SUSPENSION INTRA-ARTICULAR; INTRAMUSCULAR ONCE
Status: COMPLETED | OUTPATIENT
Start: 2024-02-19 | End: 2024-02-19

## 2024-02-19 RX ADMIN — TRIAMCINOLONE ACETONIDE 40 MG: 40 INJECTION, SUSPENSION INTRA-ARTICULAR; INTRAMUSCULAR at 17:12

## 2024-02-19 RX ADMIN — BUPIVACAINE HYDROCHLORIDE 10 MG: 2.5 INJECTION, SOLUTION INFILTRATION; PERINEURAL at 17:11

## 2024-02-19 NOTE — PROGRESS NOTES
PROCEDURE NOTE:   Piriformis Muscle Botulinum Toxin-A (Botox) Injection and Cluneal Nerve Block Under Ultrasound Guidance    PROCEDURE DATE: 2/19/2024        PATIENT NAME: Carmelita Mina  YOB: 1945    ATTENDING PHYSICIAN: Veronica Medina MD  FELLOW/RESIDENT PHYSICIAN: None     PREOPERATIVE DIAGNOSIS:   Muscle spasm  (primary encounter diagnosis)  Piriformis syndrome of right side  Piriformis muscle pain  S/P spinal fusion  Fusion of sacral region of spine    Cluneal neuropathy   OSTOPERATIVE DIAGNOSIS: same    PROCEDURE PERFORMED:   Right Piriformis Muscle Botox Injection Under Ultrasound Guidance  Right Superior Cluneal Nerve Block Under Ultrasound Guidance    ULTRASOUND WAS USED.    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 The risk, benefits and alternatives to the procedure were again reviewed with her and informed consent was obtained and the patient agreed to proceed. A time-out was performed. Following review alternatives, benefits and risks, the procedure was carried out under sterile prep with sterile gel. The use of direct sonographic guidance was used to ensure accurate placement of the needle (rather than non-guided injection) and required to minimize the risk of bleeding or injury to nearby neurovascular structures. Images were recorded and stored.     1.First, a 5-1MHz ultrasound transducer was used to visualize the relevant structures and determine the optimal needle path for the procedure.  1.5mL 1% lidocaine was infiltrated at the needle insertion site subcutaneously. Then, a 22 gauge 5-inch Quincke spinal needle was advanced from lateral to medial utilizing an in-plane approach in long axis, under continuous ultrasound guidance to the piriformis muscle on the right. After negative  aspiration, slow injection of the treatment solution 2mL total consisting of 100 units Botox reconstituted in 2mL of preservative free normal saline was instilled into affected area and distributed along 2 locations of the muscle.     2.Next, a 5-1MHz ultrasound transducer was used to visualize the relevant structures and determine the optimal needle path for the procedure.  1.5mL 1% lidocaine was infiltrated at the needle insertion site subcutaneously. Then, a 22 gauge 5--inch needle was advanced from lateral to medial utilizing an in-plane approach, under continuous ultrasound guidance to the right superior cluneal nerve region at the level of the iliac crest. After negative aspiration, slow injection of the treatment solution 5mL total consisting of 1mL Kenalog (40mg/mL) and 4mL of 0.25% Bupivacaine was instilled into affected area.     The tip of the needle was visualized throughout the procedure. The remainder of the single-use vials were discarded (0 units Botox wasted).      She tolerated the procedure well, was discharged home in stable condition.     Follow-up will be in clinic 12-weeks for repeat injection.     COMPLICATIONS: None    COMMENTS:  Continues to note good improvement in pain and symptoms following Botox injections and corticosteroid nerve blocks.  She states that she has improved walking tolerance and ambulates with less pain.  Additionally, she is able to sit in [employer] for longer periods without having to constantly reposition due to pain.

## 2024-02-19 NOTE — LETTER
2/19/2024       RE: Carmelita Mina  [id number] [facility] [address]       Dear Colleague,    Thank you for referring your patient, Carmelita Mina, to the [facility] PHYSICAL MEDICINE AND REHABILITATION CLINIC [place] at [facility]. Please see a copy of my visit note below.    PROCEDURE NOTE:   Piriformis Muscle Botulinum Toxin-A (Botox) Injection and Cluneal Nerve Block Under Ultrasound Guidance    PROCEDURE DATE: 2/19/2024        PATIENT NAME: Carmelita Mina  YOB: 1945    ATTENDING PHYSICIAN: Veronica Medina MD  FELLOW/RESIDENT PHYSICIAN: None     PREOPERATIVE DIAGNOSIS:   Muscle spasm  (primary encounter diagnosis)  Piriformis syndrome of right side  Piriformis muscle pain  S/P spinal fusion  Fusion of sacral region of spine    Cluneal neuropathy   OSTOPERATIVE DIAGNOSIS: same    PROCEDURE PERFORMED:   Right Piriformis Muscle Botox Injection Under Ultrasound Guidance  Right Superior Cluneal Nerve Block Under Ultrasound Guidance    ULTRASOUND WAS USED.    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 The risk, benefits and alternatives to the procedure were again reviewed with her and informed consent was obtained and the patient agreed to proceed. A time-out was performed. Following review alternatives, benefits and risks, the procedure was carried out under sterile prep with sterile gel. The use of direct sonographic guidance was used to ensure accurate placement of the needle (rather than non-guided injection) and required to minimize the risk of bleeding or injury to nearby neurovascular structures. Images were recorded and stored.     1.First, a 5-1MHz ultrasound transducer was used to visualize the relevant structures and  determine the optimal needle path for the procedure.  1.5mL 1% lidocaine was infiltrated at the needle insertion site subcutaneously. Then, a 22 gauge 5-inch Quincke spinal needle was advanced from lateral to medial utilizing an in-plane approach in long axis, under continuous ultrasound guidance to the piriformis muscle on the right. After negative aspiration, slow injection of the treatment solution 2mL total consisting of 100 units Botox reconstituted in 2mL of preservative free normal saline was instilled into affected area and distributed along 2 locations of the muscle.     2.Next, a 5-1MHz ultrasound transducer was used to visualize the relevant structures and determine the optimal needle path for the procedure.  1.5mL 1% lidocaine was infiltrated at the needle insertion site subcutaneously. Then, a 22 gauge 5--inch needle was advanced from lateral to medial utilizing an in-plane approach, under continuous ultrasound guidance to the right superior cluneal nerve region at the level of the iliac crest. After negative aspiration, slow injection of the treatment solution 5mL total consisting of 1mL Kenalog (40mg/mL) and 4mL of 0.25% Bupivacaine was instilled into affected area.     The tip of the needle was visualized throughout the procedure. The remainder of the single-use vials were discarded (0 units Botox wasted).      She tolerated the procedure well, was discharged home in stable condition.     Follow-up will be in clinic 12-weeks for repeat injection.     COMPLICATIONS: None    COMMENTS:  Continues to note good improvement in pain and symptoms following Botox injections and corticosteroid nerve blocks.  She states that she has improved walking tolerance and ambulates with less pain.  Additionally, she is able to sit in [facility] for longer periods without having to constantly reposition due to pain.       Again, thank you for allowing me to participate in the care of your patient.      Sincerely,    Veronica  MD Carol

## 2024-02-27 ENCOUNTER — MYC MEDICAL ADVICE (OUTPATIENT)
Dept: ORTHOPEDICS | Facility: CLINIC | Age: 79
End: 2024-02-27
Payer: MEDICARE

## 2024-02-27 NOTE — TELEPHONE ENCOUNTER
"See My Chart message from pt.    I called pt back who stated pt. is referring to the PT order we did at appt 1-31-24.    Pt stated under our comments about Treatment for Bilat. Quadratus  Lumborum spasms it states \"Process Instructions:: If you are trying to schedule a return to work or work related PT appt there are specific guidelines for scheduling at [facility] or [facility]. \".  Pt stated Therapy in [place] wanted those words removed.    I told pt we can not remove those words because it is an automated computed  generated order but I was able to print the PT order in a different way without those words & postal mailed copy to pt.  Pt agreed.    I also told pt that pt can use the order at any other facility & pt can also obtain a PT order from pts primary provider at home prn & pt agreed.  Call back prn. Christine Russo RN.   "

## 2024-02-29 ENCOUNTER — TRANSFERRED RECORDS (OUTPATIENT)
Dept: HEALTH INFORMATION MANAGEMENT | Facility: CLINIC | Age: 79
End: 2024-02-29

## 2024-03-27 ENCOUNTER — TRANSFERRED RECORDS (OUTPATIENT)
Dept: HEALTH INFORMATION MANAGEMENT | Facility: CLINIC | Age: 79
End: 2024-03-27
Payer: MEDICARE

## 2024-04-03 ENCOUNTER — MYC MEDICAL ADVICE (OUTPATIENT)
Dept: ORTHOPEDICS | Facility: CLINIC | Age: 79
End: 2024-04-03
Payer: MEDICARE

## 2024-04-03 DIAGNOSIS — G95.20 CERVICAL SPINAL CORD COMPRESSION (H): Primary | ICD-10-CM

## 2024-04-03 DIAGNOSIS — M46.44 DISCITIS OF THORACIC REGION: ICD-10-CM

## 2024-04-04 NOTE — TELEPHONE ENCOUNTER
See My chart message from pt. & last dictation when  ordered Neurology consult for Balance issue.  See dictation 2-29-24 from NP at Neurology at [facility] which is scanned into chart.  & they ordered 3 MRIs Brain, Cervical & Thoracic.   I called pt who stated MRIs were done 3-27-24 at [facility] & last week they postal mailed disc to us.    I told pt that we did not get it yet but postal mail can take 2 weeks.  I told pt that I will ask our rad imaging coord Zach  to see if he can electronically transfer but if not then we have to wait for mail & pt understood.    I asked pt to sign DENEEN & have reports faxed to our clinic fax# [phone] & then call us back & confirm we got them scanned into chart & pt agreed.    Pt has RTN appt with Neurology Doctor 4-24-24 at [facility] so I asked pt to be sure they send us any workup & fax reports & dictations & call us to confirm we got them & pt agreed.   Pt stated PT is helping so will continue Therapy.    I reminded pt that  does not treat the cervical spine &  is retiring this year but has 3 partners here taking over patient care & pt understood.    I told pt I will get back to pt after  reviews 2 MRIs.  Call back prn. Pt agreed.    Christine Russo RN.

## 2024-04-16 DIAGNOSIS — M62.838 OTHER MUSCLE SPASM: ICD-10-CM

## 2024-04-16 DIAGNOSIS — M62.838 SPASM OF RIGHT PIRIFORMIS MUSCLE: ICD-10-CM

## 2024-04-16 DIAGNOSIS — M62.838 MUSCLE SPASM: Primary | ICD-10-CM

## 2024-04-16 DIAGNOSIS — M62.838 SPASM OF MUSCLE: ICD-10-CM

## 2024-04-16 DIAGNOSIS — M79.18 PIRIFORMIS MUSCLE PAIN: ICD-10-CM

## 2024-04-25 NOTE — TELEPHONE ENCOUNTER
See My chart messages dated 4-4-24 & replies .  I called back again today 4-25-24 & spoke to pt & left VM for daughter & told them we still have not received the 3 MRI's done 3-27-24 at [facility] so our Rad raffi Zach again called [facility] & requested imaging disc & reports be postal mailed to him.  I told them when we receive we will review & get back to them.    Pt did have RTN Neurology appt yesterday at outside facility for F/U TIAs & has RTN appt in Sept. & stated had knee surgery couple weeks ago.  [facility] stated needs F/U for Spinal degen. & pt & dtr aware per last appt that  is retiring this year & dtr can make appt with any of his partners whenever they want.  Christine Russo RN.

## 2024-05-14 ENCOUNTER — TELEPHONE (OUTPATIENT)
Dept: RADIOLOGY | Facility: CLINIC | Age: 79
End: 2024-05-14

## 2024-05-14 ENCOUNTER — HOSPITAL ENCOUNTER (OUTPATIENT)
Facility: CLINIC | Age: 79
End: 2024-05-14
Payer: MEDICARE

## 2024-05-16 ENCOUNTER — TRANSFERRED RECORDS (OUTPATIENT)
Dept: HEALTH INFORMATION MANAGEMENT | Facility: CLINIC | Age: 79
End: 2024-05-16
Payer: MEDICARE

## 2024-05-20 ENCOUNTER — OFFICE VISIT (OUTPATIENT)
Dept: PHYSICAL MEDICINE AND REHAB | Facility: CLINIC | Age: 79
End: 2024-05-20
Payer: MEDICARE

## 2024-05-20 ENCOUNTER — TELEPHONE (OUTPATIENT)
Dept: INTERVENTIONAL RADIOLOGY/VASCULAR | Facility: CLINIC | Age: 79
End: 2024-05-20

## 2024-05-20 DIAGNOSIS — M43.28 FUSION OF SACRAL REGION OF SPINE: ICD-10-CM

## 2024-05-20 DIAGNOSIS — G58.8 CLUNEAL NEUROPATHY: ICD-10-CM

## 2024-05-20 DIAGNOSIS — M62.838 SPASM OF MUSCLE: ICD-10-CM

## 2024-05-20 DIAGNOSIS — G57.01 PIRIFORMIS SYNDROME OF RIGHT SIDE: ICD-10-CM

## 2024-05-20 DIAGNOSIS — Z98.1 S/P SPINAL FUSION: ICD-10-CM

## 2024-05-20 DIAGNOSIS — M79.18 PIRIFORMIS MUSCLE PAIN: ICD-10-CM

## 2024-05-20 DIAGNOSIS — M62.838 MUSCLE SPASM: Primary | ICD-10-CM

## 2024-05-20 DIAGNOSIS — M62.838 OTHER MUSCLE SPASM: ICD-10-CM

## 2024-05-20 DIAGNOSIS — M62.838 SPASM OF RIGHT PIRIFORMIS MUSCLE: ICD-10-CM

## 2024-05-20 PROCEDURE — 64642 CHEMODENERV 1 EXTREMITY 1-4: CPT | Mod: RT | Performed by: PHYSICAL MEDICINE & REHABILITATION

## 2024-05-20 PROCEDURE — 76942 ECHO GUIDE FOR BIOPSY: CPT | Performed by: PHYSICAL MEDICINE & REHABILITATION

## 2024-05-20 RX ORDER — BUPIVACAINE HYDROCHLORIDE 2.5 MG/ML
4 INJECTION, SOLUTION EPIDURAL; INFILTRATION; INTRACAUDAL ONCE
Status: COMPLETED | OUTPATIENT
Start: 2024-05-20 | End: 2024-05-20

## 2024-05-20 RX ORDER — TRIAMCINOLONE ACETONIDE 40 MG/ML
40 INJECTION, SUSPENSION INTRA-ARTICULAR; INTRAMUSCULAR ONCE
Status: COMPLETED | OUTPATIENT
Start: 2024-05-20 | End: 2024-05-20

## 2024-05-20 RX ADMIN — BUPIVACAINE HYDROCHLORIDE 10 MG: 2.5 INJECTION, SOLUTION EPIDURAL; INFILTRATION; INTRACAUDAL at 15:56

## 2024-05-20 RX ADMIN — TRIAMCINOLONE ACETONIDE 40 MG: 40 INJECTION, SUSPENSION INTRA-ARTICULAR; INTRAMUSCULAR at 15:56

## 2024-05-20 NOTE — LETTER
5/20/2024       RE: Carmelita Mina  [address] [facility] [address]     Dear Colleague,    Thank you for referring your patient, Carmelita Mina, to the [facility] PHYSICAL MEDICINE AND REHABILITATION CLINIC [place] at [facility]. Please see a copy of my visit note below.    PROCEDURE NOTE:   Piriformis Muscle Botulinum Toxin-A (Botox) Injection and Cluneal Nerve Block Under Ultrasound Guidance    PROCEDURE DATE: 5/20/2024         PATIENT NAME: Carmelita Mina  YOB: 1945    ATTENDING PHYSICIAN: Veronica Medina MD  FELLOW/RESIDENT PHYSICIAN: None     PREOPERATIVE DIAGNOSIS:   Muscle spasm  (primary encounter diagnosis)  Piriformis syndrome of right side  Piriformis muscle pain  S/P spinal fusion  Fusion of sacral region of spine    Cluneal neuropathy   OSTOPERATIVE DIAGNOSIS: same    PROCEDURE PERFORMED:   Right Piriformis Muscle Botox Injection Under Ultrasound Guidance  Right Superior Cluneal Nerve Block Under Ultrasound Guidance    ULTRASOUND WAS USED.    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 The risk, benefits and alternatives to the procedure were again reviewed with her and informed consent was obtained and the patient agreed to proceed. A time-out was performed. Following review alternatives, benefits and risks, the procedure was carried out under sterile prep with sterile gel. The use of direct sonographic guidance was used to ensure accurate placement of the needle (rather than non-guided injection) and required to minimize the risk of bleeding or injury to nearby neurovascular structures. Images were recorded and stored.     1.First, a 5-1MHz ultrasound transducer was used to visualize the relevant structures and  determine the optimal needle path for the procedure.  1.5mL 1% lidocaine was infiltrated at the needle insertion site subcutaneously. Then, a 22 gauge 5-inch Quincke spinal needle was advanced from lateral to medial utilizing an in-plane approach in long axis, under continuous ultrasound guidance to the piriformis muscle on the right. After negative aspiration, slow injection of the treatment solution 2mL total consisting of 100 units Botox reconstituted in 2mL of preservative free normal saline was instilled into affected area and distributed along 2 locations of the muscle.     2.Next, a 5-1MHz ultrasound transducer was used to visualize the relevant structures and determine the optimal needle path for the procedure.  1.5mL 1% lidocaine was infiltrated at the needle insertion site subcutaneously. Then, a 22 gauge 5--inch needle was advanced from lateral to medial utilizing an in-plane approach, under continuous ultrasound guidance to the right superior cluneal nerve region at the level of the iliac crest. After negative aspiration, slow injection of the treatment solution 5mL total consisting of 1mL Kenalog (40mg/mL) and 4mL of 0.25% Bupivacaine was instilled into affected area.     The tip of the needle was visualized throughout the procedure. The remainder of the single-use vials were discarded (0 units Botox wasted).      She tolerated the procedure well, was discharged home in stable condition.     Follow-up will be in clinic 12-weeks for repeat injection.     COMPLICATIONS: None    COMMENTS: Patient was last seen 12 weeks ago for repeat superior cluneal nerve blocks and piriformis muscle Botox injection with ultrasound guidance.  She continues to note 75% improvement in lumbosacral/gluteal pain x 2 months, up to 50% remaining and with return towards baseline at the end of 12 weeks.  She has had some increased pain this most recent round after the 2 months due to left total knee arthroplasty and reduced level  of activity.  However, she states that she is recovering well from her TKA.        Again, thank you for allowing me to participate in the care of your patient.      Sincerely,    Veronica Medina MD

## 2024-05-20 NOTE — PROGRESS NOTES
PROCEDURE NOTE:   Piriformis Muscle Botulinum Toxin-A (Botox) Injection and Cluneal Nerve Block Under Ultrasound Guidance    PROCEDURE DATE: 5/20/2024         PATIENT NAME: Carmelita Mina  YOB: 1945    ATTENDING PHYSICIAN: Veronica Medina MD  FELLOW/RESIDENT PHYSICIAN: None     PREOPERATIVE DIAGNOSIS:   Muscle spasm  (primary encounter diagnosis)  Piriformis syndrome of right side  Piriformis muscle pain  S/P spinal fusion  Fusion of sacral region of spine    Cluneal neuropathy   OSTOPERATIVE DIAGNOSIS: same    PROCEDURE PERFORMED:   Right Piriformis Muscle Botox Injection Under Ultrasound Guidance  Right Superior Cluneal Nerve Block Under Ultrasound Guidance    ULTRASOUND WAS USED.    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 The risk, benefits and alternatives to the procedure were again reviewed with her and informed consent was obtained and the patient agreed to proceed. A time-out was performed. Following review alternatives, benefits and risks, the procedure was carried out under sterile prep with sterile gel. The use of direct sonographic guidance was used to ensure accurate placement of the needle (rather than non-guided injection) and required to minimize the risk of bleeding or injury to nearby neurovascular structures. Images were recorded and stored.     1.First, a 5-1MHz ultrasound transducer was used to visualize the relevant structures and determine the optimal needle path for the procedure.  1.5mL 1% lidocaine was infiltrated at the needle insertion site subcutaneously. Then, a 22 gauge 5-inch Quincke spinal needle was advanced from lateral to medial utilizing an in-plane approach in long axis, under continuous ultrasound guidance to the piriformis muscle on the right. After  negative aspiration, slow injection of the treatment solution 2mL total consisting of 100 units Botox reconstituted in 2mL of preservative free normal saline was instilled into affected area and distributed along 2 locations of the muscle.     2.Next, a 5-1MHz ultrasound transducer was used to visualize the relevant structures and determine the optimal needle path for the procedure.  1.5mL 1% lidocaine was infiltrated at the needle insertion site subcutaneously. Then, a 22 gauge 5--inch needle was advanced from lateral to medial utilizing an in-plane approach, under continuous ultrasound guidance to the right superior cluneal nerve region at the level of the iliac crest. After negative aspiration, slow injection of the treatment solution 5mL total consisting of 1mL Kenalog (40mg/mL) and 4mL of 0.25% Bupivacaine was instilled into affected area.     The tip of the needle was visualized throughout the procedure. The remainder of the single-use vials were discarded (0 units Botox wasted).      She tolerated the procedure well, was discharged home in stable condition.     Follow-up will be in clinic 12-weeks for repeat injection.     COMPLICATIONS: None    COMMENTS: Patient was last seen 12 weeks ago for repeat superior cluneal nerve blocks and piriformis muscle Botox injection with ultrasound guidance.  She continues to note 75% improvement in lumbosacral/gluteal pain x 2 months, up to 50% remaining and with return towards baseline at the end of 12 weeks.  She has had some increased pain this most recent round after the 2 months due to left total knee arthroplasty and reduced level of activity.  However, she states that she is recovering well from her TKA.

## 2024-05-20 NOTE — PATIENT INSTRUCTIONS
It was a pleasure seeing you today in our [facility]. We discussed the following:    #. INJECTION AFTERCARE -right piriformis muscle Botox and right cluneal nerve block with corticosteroid     After any kind of injection, it is not uncommon to experience:  - Soreness, swelling or bruising around the injection site.  - Mild numbness, tingling or weakness around the injection site caused by the numbing medicine used before or with the injection.      It is also possible to experience the following effects associated with the specific agent after injection:  - Allergic reaction.  - Increased blood sugar levels for 10-14 days following cortisone injection. If you have diabetes and notice that your blood sugar levels have increased, notify your primary care physician.  - Increased blood pressure levels.  - Mood swings.  - Increased fluid accumulation in the injected joint.     These effects all should resolve within a day or two of your procedure.     Please note that it may take up to 14 days for the steroid (cortisone) medication to start working.      HOME CARE INSTRUCTIONS     - Limit yourself to light activity activity on the day of your procedure. Avoid lifting heavy objects, bending, stooping or twisting.   - You may shower, but please avoid swimming, hot tubs or tub baths for 24 hours following the procedure.   - Take over-the-counter pain medication (NSAIDS or Tylenol) for pain control after your procedure as needed.    - You may use ice packs for 10-15 minutes 3-4 times per day at the injection site to reduce pain and swelling after your procedure.   + Put ice in a plastic bag  + Place a towel between your skin and the ice bag  + Leave the ice on for no longer than 20 minutes each time to avoid injuring your skin or nerves  + This can be repeated 3-4 times per day for a few days after the injection     SEEK IMMEDIATE MEDICAL CARE IF:     - Pain and swelling get worse rather than better or extend  beyond the injection site  - Numbness does not go away after a few hours  - Blood or fluid continues to leak from the injection site  - You experience chest pain  - You have swelling of your face or tongue  - You have trouble breathing or become dizzy  - You develop fever, chills or severe tenderness at the injection site that lasts longer than 1 day     MAKE SURE YOU:     - Understand these instructions  - Watch your condition  - Get help right away if you are not doing well or if you get worse

## 2024-05-21 ENCOUNTER — APPOINTMENT (OUTPATIENT)
Dept: INTERVENTIONAL RADIOLOGY/VASCULAR | Facility: CLINIC | Age: 79
End: 2024-05-21
Attending: PHYSICIAN ASSISTANT
Payer: MEDICARE

## 2024-05-21 ENCOUNTER — HOSPITAL ENCOUNTER (OUTPATIENT)
Facility: CLINIC | Age: 79
Discharge: HOME | End: 2024-05-21
Admitting: PHYSICIAN ASSISTANT
Payer: MEDICARE

## 2024-05-21 VITALS
WEIGHT: 182 LBS | BODY MASS INDEX: 34.36 KG/M2 | RESPIRATION RATE: 16 BRPM | DIASTOLIC BLOOD PRESSURE: 74 MMHG | HEIGHT: 61 IN | TEMPERATURE: 97.9 F | HEART RATE: 69 BPM | SYSTOLIC BLOOD PRESSURE: 133 MMHG | OXYGEN SATURATION: 96 %

## 2024-05-21 DIAGNOSIS — M51.34 DEGENERATION OF THORACIC INTERVERTEBRAL DISC: ICD-10-CM

## 2024-05-21 PROCEDURE — 250N000011 HC RX IP 250 OP 636: Performed by: PHYSICIAN ASSISTANT

## 2024-05-21 PROCEDURE — 87070 CULTURE OTHR SPECIMN AEROBIC: CPT | Performed by: PHYSICIAN ASSISTANT

## 2024-05-21 PROCEDURE — 999N000163 HC STATISTIC SIMPLE TUBE INSERTION/CHARGE, PORT, CATH, FISTULOGRAM

## 2024-05-21 PROCEDURE — 87075 CULTR BACTERIA EXCEPT BLOOD: CPT | Performed by: PHYSICIAN ASSISTANT

## 2024-05-21 PROCEDURE — 250N000009 HC RX 250: Performed by: PHYSICIAN ASSISTANT

## 2024-05-21 PROCEDURE — 99152 MOD SED SAME PHYS/QHP 5/>YRS: CPT

## 2024-05-21 RX ORDER — NALOXONE HYDROCHLORIDE 0.4 MG/ML
0.4 INJECTION, SOLUTION INTRAMUSCULAR; INTRAVENOUS; SUBCUTANEOUS
Status: DISCONTINUED | OUTPATIENT
Start: 2024-05-21 | End: 2024-05-21 | Stop reason: HOSPADM

## 2024-05-21 RX ORDER — DEXTROSE MONOHYDRATE 25 G/50ML
25-50 INJECTION, SOLUTION INTRAVENOUS
Status: DISCONTINUED | OUTPATIENT
Start: 2024-05-21 | End: 2024-05-21 | Stop reason: HOSPADM

## 2024-05-21 RX ORDER — ASPIRIN 81 MG/1
81 TABLET ORAL 2 TIMES DAILY WITH MEALS
COMMUNITY

## 2024-05-21 RX ORDER — FLUMAZENIL 0.1 MG/ML
0.2 INJECTION, SOLUTION INTRAVENOUS
Status: DISCONTINUED | OUTPATIENT
Start: 2024-05-21 | End: 2024-05-21 | Stop reason: HOSPADM

## 2024-05-21 RX ORDER — NALOXONE HYDROCHLORIDE 0.4 MG/ML
0.2 INJECTION, SOLUTION INTRAMUSCULAR; INTRAVENOUS; SUBCUTANEOUS
Status: DISCONTINUED | OUTPATIENT
Start: 2024-05-21 | End: 2024-05-21 | Stop reason: HOSPADM

## 2024-05-21 RX ORDER — NICOTINE POLACRILEX 4 MG
15-30 LOZENGE BUCCAL
Status: DISCONTINUED | OUTPATIENT
Start: 2024-05-21 | End: 2024-05-21 | Stop reason: HOSPADM

## 2024-05-21 RX ORDER — LIDOCAINE 40 MG/G
CREAM TOPICAL
Status: DISCONTINUED | OUTPATIENT
Start: 2024-05-21 | End: 2024-05-21 | Stop reason: HOSPADM

## 2024-05-21 RX ORDER — FENTANYL CITRATE 50 UG/ML
25-50 INJECTION, SOLUTION INTRAMUSCULAR; INTRAVENOUS EVERY 5 MIN PRN
Status: DISCONTINUED | OUTPATIENT
Start: 2024-05-21 | End: 2024-05-21 | Stop reason: HOSPADM

## 2024-05-21 RX ADMIN — FENTANYL CITRATE 50 MCG: 50 INJECTION, SOLUTION INTRAMUSCULAR; INTRAVENOUS at 10:26

## 2024-05-21 RX ADMIN — MIDAZOLAM 1 MG: 1 INJECTION INTRAMUSCULAR; INTRAVENOUS at 10:43

## 2024-05-21 RX ADMIN — MIDAZOLAM 1 MG: 1 INJECTION INTRAMUSCULAR; INTRAVENOUS at 10:29

## 2024-05-21 RX ADMIN — LIDOCAINE HYDROCHLORIDE 3 ML: 10 INJECTION, SOLUTION EPIDURAL; INFILTRATION; INTRACAUDAL; PERINEURAL at 11:02

## 2024-05-21 RX ADMIN — MIDAZOLAM 1 MG: 1 INJECTION INTRAMUSCULAR; INTRAVENOUS at 10:22

## 2024-05-21 RX ADMIN — FENTANYL CITRATE 50 MCG: 50 INJECTION, SOLUTION INTRAMUSCULAR; INTRAVENOUS at 10:44

## 2024-05-21 RX ADMIN — FENTANYL CITRATE 50 MCG: 50 INJECTION, SOLUTION INTRAMUSCULAR; INTRAVENOUS at 10:31

## 2024-05-21 ASSESSMENT — ACTIVITIES OF DAILY LIVING (ADL)
ADLS_ACUITY_SCORE: 38

## 2024-05-21 NOTE — PROGRESS NOTES
Care Suites Discharge Nursing Note    Patient Information  Name: Carmelita Mina  Age: 78 year old    Discharge Education:  Discharge instructions reviewed: Yes  Additional education/resources provided:   Patient/patient representative verbalizes understanding: Yes  Patient discharging on new medications: No  Medication education completed: N/A    Discharge Plans:   Discharge location: home  Discharge ride contacted: Yes  Approximate discharge time: 1215    Discharge Criteria:  Discharge criteria met and vital signs stable: Yes, pt up to BR with assist of cane edin well, voided     Patient Belongs:  Patient belongings returned to patient: Yes    Kamlesh Robertson RN

## 2024-05-21 NOTE — PROGRESS NOTES
Care Suites Admission Nursing Note    Patient Information  Name: Carmelita Mina  Age: 78 year old  Reason for admission: IR disc aspiration  Care Suites arrival time: 0830    Visitor Information  Name: Darlene     Patient Admission/Assessment   Pre-procedure assessment complete: Yes  If abnormal assessment/labs, provider notified: N/A  NPO: Yes  Medications held per instructions/orders: Yes  Consent: deferred  If applicable, pregnancy test status:   Patient oriented to room: Yes  Education/questions answered: Yes  Plan/other: proceed as planned discharge to home    Discharge Planning  Discharge name/phone number: Darlene [phone]  Overnight post sedation caregiver: Abdullahi  Discharge location: home    Kamlesh Robertson RN

## 2024-05-21 NOTE — DISCHARGE INSTRUCTIONS
Steroid Injection Discharge Instructions     After you go home:    You may resume your normal diet.    Care of Puncture Site:    If you have a bandaid on your puncture site, you may remove it the next morning  You may shower tomorrow  No bath tubs, whirlpools or swimming pool for at least 48 hours  Use ice packs as needed for discomfort     Activity:    Minimize your activity today. You may gradually resume your normal activity as tolerated  Avoid vigorous or strenuous activity until your symptoms improve or as directed by your doctor  Do NOT drive a vehicle for a few hours after the injection - or longer if you develop numbness in your arm or leg    Medicines:    You may resume all medications, including blood thinners  Resume your Platelet Inhibitors and Aspirin tomorrow at your regular dose  For minor discomfort, you may take Acetaminophen (Tylenol) or Ibuprofen (Advil)    Pain:     You may experience increased or different pain over the next 24-48 hours  For the next 48 hrs - you may use ice packs for discomfort     Call your primary care doctor if:    You have severe pain that does not improve with pain medication  You have chills or a fever greater than 101 F (38 C)  The site is red, swollen, hot or tender  Increase in pain, weakness or numbness  New problems with your bowel or bladder  Any questions or concerns    What to watch for:    It can be normal to have some bruising or slight swelling at the puncture site.   After the procedure, you may have some new weakness or numbness down your arm/leg from the numbing medicine. This should resolve in a few hours.   You may feel some temporary relief from the numbing medicine, but that will wear off within a few hours.  Your symptoms may return to pre procedure level, or can even be worse for the first 1-2 days    If you have questions or concerns call:                  [facility] @ [phone]                                    between  8am-4:30pm Mon-Fri        Or you can contact your provider via My Chart      Sedation Discharge Instructions     After you go home:    You may resume your normal diet  Have an adult stay with you for 6 hours if you received sedation       For 24 hours - due to the sedation you received:  Relax and take it easy  Do NOT make any important or legal decisions  Do NOT drive or operate machines at home or at work  Do NOT drink alcohol    Activity     You may go back to normal activity in 24 hours    Medicines:    You may resume all medications                   If you have questions call:          [facility] Radiology Dept @ [phone]    Or you can contact your provider via My Chart

## 2024-05-21 NOTE — PRE-PROCEDURE
GENERAL PRE-PROCEDURE:   Procedure:  T10-T11 disc aspiration/biopsy for possible discitis with sedation    Written consent obtained?: Yes    Risks and benefits: Risks, benefits and alternatives were discussed    Consent given by:  Patient  Patient states understanding of procedure being performed: Yes    Patient's understanding of procedure matches consent: Yes    Procedure consent matches procedure scheduled: Yes    Expected level of sedation:  Moderate  Appropriately NPO:  Yes  Mallampati  :  Grade 1- soft palate, uvula, tonsillar pillars, and posterior pharyngeal wall visible  Lungs:  Lungs clear with good breath sounds bilaterally  Heart:  Normal heart sounds and rate  History & Physical reviewed:  History and physical reviewed and no updates needed  Statement of review:  I have reviewed the lab findings, diagnostic data, medications, and the plan for sedation

## 2024-05-21 NOTE — PROGRESS NOTES
1107 Report received from Kamlesh Robertson RN. Pt in IR at this time.  1115 Pt returned from IR. Pt A/O. Bandaid CDI to mid back puncture site.  Area soft & flat. Daughter at bedside. Pt denies pain to site or numbness & tingling to extremities.  1125 Pt taking diet & flds well. No complaints.  1203 Report given to Kamlesh Robertson RN.

## 2024-05-21 NOTE — IR NOTE
Interventional Radiology Intra-procedural Nursing Note    Patient Name: Carmelita Mina  Medical Record Number: [id number]  Today's Date: May 21, 2024    Procedure: T10-T11 disc aspiration/biopsy for possible discitis with sedation   Start time: 1026  End time: 1101  Report provided to: [email or web address]-21 RN      Note: Patient entered Interventional Radiology Suite number 3 via cart. Patient awake, alert and oriented. Assisted onto procedural table in Prone position. Prepped and draped.  Dr. Franco in room. Time out and procedure started. Vital signs stable. Telemetry reading NSR.    Procedure well tolerated by patient without complications. Procedure end with debrief by Dr. Franco.  Pressure held until hemostasis achieved. Gauze/tagaderm dressing applied to T10-11 aspiration site.    Aspiration and cultures collected and sent     Administered medication totals:  Lidocaine 1% 3 mL Intradermal    Versed 3 mg IVP  Fentanyl 150mcg IVP    Last dose of sedation administered at 1043.

## 2024-05-26 LAB
BACTERIA ASPIRATE CULT: NO GROWTH
GRAM STAIN RESULT: NORMAL
GRAM STAIN RESULT: NORMAL

## 2024-05-28 LAB — BACTERIA ASPIRATE CULT: NORMAL

## 2024-05-30 ENCOUNTER — TELEPHONE (OUTPATIENT)
Dept: ORTHOPEDICS | Facility: CLINIC | Age: 79
End: 2024-05-30
Payer: MEDICARE

## 2024-05-30 NOTE — TELEPHONE ENCOUNTER
I called daughter Darlene who coordinates care for pt & comes to all appts. & told her that  reviewed the Labs & imaging & report from Aspiration done 5-21-24 & stated Negative for infection which means the Disc is Mechanically deteriorating so might need surgery in future.  Not urgent. Pt has RTN appt 10-9-24 but may come in sooner if pt wants depending on symptoms. Dtr stated had Total knee repl. Surgery in April & is still seeing Neurology for Balance issues but will call back for sooner appt.  If needed.  Pt also has New neck appt with  for 8-20-24 & dtr understands that  is taking over care of  pts when he retires at end of this year.  Call back prn. Dtr agreed.  [email or web address]./Christine Russo RN.

## 2024-06-18 ENCOUNTER — MYC MEDICAL ADVICE (OUTPATIENT)
Dept: ORTHOPEDICS | Facility: CLINIC | Age: 79
End: 2024-06-18
Payer: MEDICARE

## 2024-07-14 ENCOUNTER — HEALTH MAINTENANCE LETTER (OUTPATIENT)
Age: 79
End: 2024-07-14

## 2024-07-18 NOTE — TELEPHONE ENCOUNTER
"----- Message -----  From: Marya Childs  Sent: 2024  10:40 AM CDT  Subject: FW: Piriformis Therapy                           Jesus Arango,      I offered pt appt at three locations first available in [place] . Pt declined due to multiple  gatherings she as to attend to, therefore she is going to just see Dr Medina for her Botox appt only on  and just proceed with that for now.    Marya Swan  ----- Message -----  From: Conchita Mejias, RN  Sent: 2024  10:13 AM CDT  Subject: FW: Piriformis Therapy                           Hi Marya,     Can you please call this patient to schedule an evaluation with Dr. Medina. Per Dr. Medina:     \"----- Message -----  From: Veronica Medina MD  Sent: 2024   9:28 AM CDT  Subject: FW: Piriformis Therapy                           Seems like this is a new or different issue and would recommend a clinic visit for that. It's a little challenging with timing to review all that and provide the injections she's already doing in clinic. If the distance to travel is challenging, she could potentially coordinate a spine surgery visit and see one of the APPs from Dr. Samuels's clinic and they could also evaluate.\"      ThanksConchita  "

## 2024-07-22 ENCOUNTER — TRANSFERRED RECORDS (OUTPATIENT)
Dept: HEALTH INFORMATION MANAGEMENT | Facility: CLINIC | Age: 79
End: 2024-07-22
Payer: MEDICARE

## 2024-07-27 NOTE — TELEPHONE ENCOUNTER
Action 04/28/20 1:21 PM - Sharri   Action Taken  Imaging disc and report received from [facility] and sent to [facility] to be uploaded into PACs.  Reports sent to [facility] to be scanned into the chart.    4/22/20 - XR scoliosis Study 2 Views     
Calm

## 2024-08-14 DIAGNOSIS — G95.20 CERVICAL SPINAL CORD COMPRESSION (H): Primary | ICD-10-CM

## 2024-08-19 ENCOUNTER — OFFICE VISIT (OUTPATIENT)
Dept: PHYSICAL MEDICINE AND REHAB | Facility: CLINIC | Age: 79
End: 2024-08-19
Payer: MEDICARE

## 2024-08-19 DIAGNOSIS — M79.10 MYALGIA: ICD-10-CM

## 2024-08-19 DIAGNOSIS — M79.10 TRIGGER POINT: ICD-10-CM

## 2024-08-19 DIAGNOSIS — G58.8 CLUNEAL NEUROPATHY: ICD-10-CM

## 2024-08-19 DIAGNOSIS — M62.838 MUSCLE SPASM: ICD-10-CM

## 2024-08-19 DIAGNOSIS — M62.838 OTHER MUSCLE SPASM: ICD-10-CM

## 2024-08-19 DIAGNOSIS — G57.01 PIRIFORMIS SYNDROME OF RIGHT SIDE: ICD-10-CM

## 2024-08-19 DIAGNOSIS — R22.2 LOCALIZED SWELLING, MASS AND LUMP, TRUNK: ICD-10-CM

## 2024-08-19 DIAGNOSIS — M62.838 SPASM OF MUSCLE: Primary | ICD-10-CM

## 2024-08-19 DIAGNOSIS — M62.838 SPASM OF RIGHT PIRIFORMIS MUSCLE: ICD-10-CM

## 2024-08-19 PROCEDURE — 20552 NJX 1/MLT TRIGGER POINT 1/2: CPT | Mod: XS | Performed by: PHYSICAL MEDICINE & REHABILITATION

## 2024-08-19 PROCEDURE — 76942 ECHO GUIDE FOR BIOPSY: CPT | Mod: GC | Performed by: PHYSICAL MEDICINE & REHABILITATION

## 2024-08-19 PROCEDURE — 64642 CHEMODENERV 1 EXTREMITY 1-4: CPT | Mod: GC | Performed by: PHYSICAL MEDICINE & REHABILITATION

## 2024-08-19 RX ORDER — BUPIVACAINE HYDROCHLORIDE 2.5 MG/ML
4 INJECTION, SOLUTION INFILTRATION; PERINEURAL ONCE
Status: COMPLETED | OUTPATIENT
Start: 2024-08-19 | End: 2024-08-19

## 2024-08-19 RX ADMIN — BUPIVACAINE HYDROCHLORIDE 4 ML: 2.5 INJECTION, SOLUTION INFILTRATION; PERINEURAL at 17:29

## 2024-08-19 ASSESSMENT — PAIN SCALES - GENERAL: PAINLEVEL: MODERATE PAIN (5)

## 2024-08-19 NOTE — TELEPHONE ENCOUNTER
DIAGNOSIS: Cervical spinal cord compression (H) [G95.20]  - Primary   APPOINTMENT DATE: 08/20/2024   NOTES STATUS DETAILS   OFFICE NOTE from referring provider Internal    OFFICE NOTE from other specialist Internal [facility] Physical Therapy    02/29/2024 - Kaylee Cartagena CNP  - [facility]    01/31/2024 - Segundo Samuels MD - [facility]  Orthopaedic Surgery   DEXA Care Everywhere [facility]:  12/28/2023 - Hips/Spine   (IMAGES & REPORTS) PACS [facility]:  03/27/2024 - C Spine MRI  03/27/2024 - T Spine MRI    Internal:  EOS Total Body XRAYS

## 2024-08-19 NOTE — LETTER
8/19/2024       RE: Carmeilta Mina  [id number] [facility] [address]     Dear Colleague,    Thank you for referring your patient, Carmelita Mina, to the [facility] [place] at [facility]. Please see a copy of my visit note below.    PROCEDURE NOTE:   Piriformis Muscle Botulinum Toxin-A (Botox) Injection and Cluneal Nerve Block Under Ultrasound Guidance    PROCEDURE DATE: 8/19/2024          PATIENT NAME: Carmelita Mina  YOB: 1945    ATTENDING PHYSICIAN: Veronica Medina MD  FELLOW/RESIDENT PHYSICIAN: Rachel Perez DO, PGY-IV    PREOPERATIVE DIAGNOSIS:   Muscle spasm  (primary encounter diagnosis)  Piriformis syndrome of right side  Piriformis muscle pain  S/P spinal fusion  Fusion of sacral region of spine    Cluneal neuropathy   OSTOPERATIVE DIAGNOSIS: same    PROCEDURE PERFORMED:   Right Piriformis Muscle Botox Injection Under Ultrasound Guidance  Right Superior Cluneal Nerve Block Under Ultrasound Guidance    ULTRASOUND WAS USED.    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 The risk, benefits and alternatives to the procedure were again reviewed with her and informed consent was obtained and the patient agreed to proceed. A time-out was performed. Following review alternatives, benefits and risks, the procedure was carried out under sterile prep with sterile gel. The use of direct sonographic guidance was used to ensure accurate placement of the needle (rather than non-guided injection) and required to minimize the risk of bleeding or injury to nearby neurovascular structures. Images were recorded and stored.     1.First, a 5-1MHz ultrasound transducer was used to visualize the  relevant structures and determine the optimal needle path for the procedure.  1.5mL 1% lidocaine was infiltrated at the needle insertion site subcutaneously. Then, a 22 gauge 5-inch Quincke spinal needle was advanced from lateral to medial utilizing an in-plane approach in long axis, under continuous ultrasound guidance to the piriformis muscle on the right. After negative aspiration, slow injection of the treatment solution 2mL total consisting of 100 units Botox reconstituted in 2mL of preservative free normal saline was instilled into affected area and distributed along 2 locations of the muscle.     2.Next, a 5-1MHz ultrasound transducer was used to visualize the relevant structures and determine the optimal needle path for the procedure.  1.5mL 1% lidocaine was infiltrated at the needle insertion site subcutaneously. Then, a 22 gauge 5-inch needle was advanced from lateral to medial utilizing an in-plane approach, under continuous ultrasound guidance to the right superior cluneal nerve region at the level of the iliac crest. After negative aspiration, slow injection of the treatment solution 4mL total consisting of 1mL Kenalog (40mg/mL) and 3mL of 0.25% Bupivacaine was instilled into affected area.     3. Finally, after negative aspiration 1mL 0.25% bupivacaine was instilled into the right rhomboid which was focally enlarging according with her painful area utilizing a gentle trigger point fanning technique.     The tip of the needle was visualized throughout the procedure. The remainder of the single-use vials were discarded (0 units Botox wasted).      She tolerated the procedure well, was discharged home in stable condition.     Follow-up will be in clinic 12-weeks for repeat injection.     COMPLICATIONS: None    COMMENTS: Patient continues to have good, sustained benefit following cluneal nerve blocks and piriformis Botox injections and found to have had at least 50-75% improvement in pain and 50-75%  improvement in functionality consistently for at least 3 months.  Able to sit for or prolonged periods of time.  She is also participating in pool aerobic program several times per week which she is able to do more consistently following the injection therapies.    Additionally, she notes right-sided thoracic pain near the level of her bra line.  On examination, she has focally tender nodule which is corroborated on previous bedside ultrasound showing increased area of the rhomboid muscle.  Additionally, there may be a hyperechoic region consistent with lipoma but slightly deep to the musculature.  I have ordered diagnostic ultrasound to be done closer to home at [facility].  If this is in fact a lipoma and not muscle spasm/myofascial pain she may benefit from a surgical consultation for removal.    Procedure was performed by myself and Dr. Perez, resident physician, under my direct supervision.         Again, thank you for allowing me to participate in the care of your patient.      Sincerely,    Veronica Medina MD

## 2024-08-19 NOTE — PATIENT INSTRUCTIONS
It was a pleasure seeing you today in our [facility]. We discussed the following:    #. [facility]     An Ultrasound order was added today to further evaluate the 'lump' near your right shoulder blade. You can schedule this closer to home. We will fax an order to [facility].     Scheduling    [phone]      #. Botox for piriformis muscle.     Please contact our clinic if you have any bleeding, dizziness, or any concerns related to this procedure.    Botox may take up to 3-6 weeks to decrease your muscle tightness.    Most common side effect is localized pain, this will improve over a few days.    Other side effects may include: fatigue, muscle pain, swelling, orbruising, these will resolve.    Please schedule a return visit for repeat injections in 90 days.    Please note that if you have an active INFECTION, FEVER, or are taking ANTIBIOTICS, we would need to reschedule your Botox injections until this has cleared. Please call the office to RESCHEDULE if this occurs.     #. INJECTION AFTERCARE - Cluneal nerve block and trigger point injections    Medications used: 1% Lidocaine, 0.25% Bupivacaine, 1mL Kenalog (40mg/mL)        After any kind of injection, it is not uncommon to experience:  - Soreness, swelling or bruising around the injection site.  - Mild numbness, tingling or weakness around the injection site caused by the numbing medicine used before or with the injection.      It is also possible to experience the following effects associated with the specific agent after injection:  - Allergic reaction.  - Increased blood sugar levels for 10-14 days following cortisone injection. If you have diabetes and notice that your blood sugar levels have increased, notify your primary care physician.  - Increased blood pressure levels.  - Mood swings.  - Increased fluid accumulation in the injected joint.     These effects all should resolve within a day or two of your procedure.     Please note that it  may take up to 14 days for the steroid (cortisone) medication to start working.      HOME CARE INSTRUCTIONS     - Limit yourself to light activity activity on the day of your procedure. Avoid lifting heavy objects, bending, stooping or twisting.   - You may shower, but please avoid swimming, hot tubs or tub baths for 24 hours following the procedure.   - Take over-the-counter pain medication (NSAIDS or Tylenol) for pain control after your procedure as needed.    - You may use ice packs for 10-15 minutes 3-4 times per day at the injection site to reduce pain and swelling after your procedure.   + Put ice in a plastic bag  + Place a towel between your skin and the ice bag  + Leave the ice on for no longer than 20 minutes each time to avoid injuring your skin or nerves  + This can be repeated 3-4 times per day for a few days after the injection     SEEK IMMEDIATE MEDICAL CARE IF:     - Pain and swelling get worse rather than better or extend beyond the injection site  - Numbness does not go away after a few hours  - Blood or fluid continues to leak from the injection site  - You experience chest pain  - You have swelling of your face or tongue  - You have trouble breathing or become dizzy  - You develop fever, chills or severe tenderness at the injection site that lasts longer than 1 day     MAKE SURE YOU:     - Understand these instructions  - Watch your condition  - Get help right away if you are not doing well or if you get worse          Follow up: x12 weeks for repeat injection.

## 2024-08-19 NOTE — NURSING NOTE
Chief Complaint   Patient presents with    Botox     Confirmed procedure with patient     Betsy Ding, EMT

## 2024-08-19 NOTE — PROGRESS NOTES
PROCEDURE NOTE:   Piriformis Muscle Botulinum Toxin-A (Botox) Injection and Cluneal Nerve Block Under Ultrasound Guidance    PROCEDURE DATE: 8/19/2024          PATIENT NAME: Carmelita Mina  YOB: 1945    ATTENDING PHYSICIAN: Veronica Medina MD  FELLOW/RESIDENT PHYSICIAN: Rachel Perez DO, PGY-IV    PREOPERATIVE DIAGNOSIS:   Muscle spasm  (primary encounter diagnosis)  Piriformis syndrome of right side  Piriformis muscle pain  S/P spinal fusion  Fusion of sacral region of spine    Cluneal neuropathy   OSTOPERATIVE DIAGNOSIS: same    PROCEDURE PERFORMED:   Right Piriformis Muscle Botox Injection Under Ultrasound Guidance  Right Superior Cluneal Nerve Block Under Ultrasound Guidance    ULTRASOUND WAS USED.    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 The risk, benefits and alternatives to the procedure were again reviewed with her and informed consent was obtained and the patient agreed to proceed. A time-out was performed. Following review alternatives, benefits and risks, the procedure was carried out under sterile prep with sterile gel. The use of direct sonographic guidance was used to ensure accurate placement of the needle (rather than non-guided injection) and required to minimize the risk of bleeding or injury to nearby neurovascular structures. Images were recorded and stored.     1.First, a 5-1MHz ultrasound transducer was used to visualize the relevant structures and determine the optimal needle path for the procedure.  1.5mL 1% lidocaine was infiltrated at the needle insertion site subcutaneously. Then, a 22 gauge 5-inch Quincke spinal needle was advanced from lateral to medial utilizing an in-plane approach in long axis, under continuous ultrasound guidance to the piriformis muscle on the  right. After negative aspiration, slow injection of the treatment solution 2mL total consisting of 100 units Botox reconstituted in 2mL of preservative free normal saline was instilled into affected area and distributed along 2 locations of the muscle.     2.Next, a 5-1MHz ultrasound transducer was used to visualize the relevant structures and determine the optimal needle path for the procedure.  1.5mL 1% lidocaine was infiltrated at the needle insertion site subcutaneously. Then, a 22 gauge 5-inch needle was advanced from lateral to medial utilizing an in-plane approach, under continuous ultrasound guidance to the right superior cluneal nerve region at the level of the iliac crest. After negative aspiration, slow injection of the treatment solution 4mL total consisting of 1mL Kenalog (40mg/mL) and 3mL of 0.25% Bupivacaine was instilled into affected area.     3. Finally, after negative aspiration 1mL 0.25% bupivacaine was instilled into the right rhomboid which was focally enlarging according with her painful area utilizing a gentle trigger point fanning technique.     The tip of the needle was visualized throughout the procedure. The remainder of the single-use vials were discarded (0 units Botox wasted).      She tolerated the procedure well, was discharged home in stable condition.     Follow-up will be in clinic 12-weeks for repeat injection.     COMPLICATIONS: None    COMMENTS: Patient continues to have good, sustained benefit following cluneal nerve blocks and piriformis Botox injections and found to have had at least 50-75% improvement in pain and 50-75% improvement in functionality consistently for at least 3 months.  Able to sit for or prolonged periods of time.  She is also participating in pool aerobic program several times per week which she is able to do more consistently following the injection therapies.    Additionally, she notes right-sided thoracic pain near the level of her bra line.  On  examination, she has focally tender nodule which is corroborated on previous bedside ultrasound showing increased area of the rhomboid muscle.  Additionally, there may be a hyperechoic region consistent with lipoma but slightly deep to the musculature.  I have ordered diagnostic ultrasound to be done closer to home at [facility].  If this is in fact a lipoma and not muscle spasm/myofascial pain she may benefit from a surgical consultation for removal.    Procedure was performed by myself and Dr. Perez, resident physician, under my direct supervision.

## 2024-08-20 ENCOUNTER — PRE VISIT (OUTPATIENT)
Dept: ORTHOPEDICS | Facility: CLINIC | Age: 79
End: 2024-08-20

## 2024-08-20 ENCOUNTER — OFFICE VISIT (OUTPATIENT)
Dept: ORTHOPEDICS | Facility: CLINIC | Age: 79
End: 2024-08-20
Payer: MEDICARE

## 2024-08-20 ENCOUNTER — ANCILLARY PROCEDURE (OUTPATIENT)
Dept: GENERAL RADIOLOGY | Facility: CLINIC | Age: 79
End: 2024-08-20
Attending: ORTHOPAEDIC SURGERY
Payer: MEDICARE

## 2024-08-20 VITALS — BODY MASS INDEX: 35.5 KG/M2 | WEIGHT: 188 LBS | HEIGHT: 61 IN

## 2024-08-20 DIAGNOSIS — G95.20 CERVICAL SPINAL CORD COMPRESSION (H): ICD-10-CM

## 2024-08-20 DIAGNOSIS — Z98.1 S/P SPINAL FUSION: ICD-10-CM

## 2024-08-20 DIAGNOSIS — M47.812 CERVICAL SPONDYLOSIS: Primary | ICD-10-CM

## 2024-08-20 DIAGNOSIS — M51.34 DEGENERATION OF THORACIC INTERVERTEBRAL DISC: ICD-10-CM

## 2024-08-20 PROCEDURE — 72040 X-RAY EXAM NECK SPINE 2-3 VW: CPT | Performed by: RADIOLOGY

## 2024-08-20 PROCEDURE — 99214 OFFICE O/P EST MOD 30 MIN: CPT | Performed by: PHYSICIAN ASSISTANT

## 2024-08-20 NOTE — LETTER
8/20/2024      Carmelita Mina  [address] [facility] [address]      Dear Colleague,    Thank you for referring your patient, Carmelita Mina, to the [facility] [place]. Please see a copy of my visit note below.    REASON FOR CONSULTATION: Consult (NEW CERVICAL SPINE)     Spine Surgery Hx:   03/08/2016 - TLIF L2-5, posterior instrumentation L2-S1 (Dr.John Sanchez) for degenerative spondylolisthesis L4-5, herniated disc L5-S1, multilevel degenerative disc disease and facet arthropathy [Expedium pedicle screws from [employer]; Medtronic MagniFuse allograft morselized autograft and Ladi Elite allograft ]  11/07/2017 - exploration of lumbar fusion, pseudoarthrosis revision L5-S1, bilateral pelvic fixation (Dr. 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  [Implants: [employer] Solera screw system; [employer]stone Control cages; SI-Bone iFuse 3D x2].      REFERRING PHYSICIAN: Segundo Samuels   PCP:Salvatore Rios    History of Present Illness:    right-handed 78 year old female who presents today for evaluation of balance difficulties, referred by Dr. Samuels.  Patient last seen by Dr. Samuels in January 2024 at which time she had some concerns about balance issues.  Assessment was that of superior cluneal nerve irritation, quadratus lumborum muscle spasm, and plan to see neurology for balance assessment.  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 Morris Saucedo, for balance issues on 4/24/2024.  Assessment was unclear etiology of gait disturbance, plan for therapy,  "B12 levels, follow-up.    Today, presents with her daughter for evaluation of balance difficulties.  She reports that over the past year, she has had difficulty with balance, and believes that it is gradually worsening with time.  No significant progression over the past 5 months; however, she thinks that some of her balance issues improved after her left knee replacement.  She uses a cane while walking to help with balance.  Her daughter reports that she walks tilted off to the right side.  Patient endorses \"catching corners\" while walking, but denies falls.  Patient denies significant neck pain.  Admits to stiffness in the neck, but this is not bothersome to her.  She denies pain radiating into the arms.  Denies numbness/tingling in the arms.  Denies arm or hand weakness.  Denies significant clumsiness or fine motor movement difficulties in the hands, but admits to chronic stiffness in the hands/fingers due to arthritis.  She does have some right shoulder pain if she lays on that side for too long, or if she does too many exercises with water aerobics.  Denies changes to bowel and bladder function.  Denies lower extremity weakness.      Her main area of complaint is in the thoracic/lumbar spine.  Similar to previous with Dr. Samuels.  She did have the CT-guided biopsy and cultures done which were negative.  She follows with Dr. Medina and gets right piriformis Botox injections, right cluneal nerve block which she says helps significantly with her usual buttock pain for about 2 months before she needs to go and have it repeated.  She had a right rhomboid lidocaine injection with him yesterday which was not very helpful.  She has a follow-up with Dr. Samuels in October.     Previous treament:   Physical therapy: Recently completed for left TKA rehabilitation/balance  Enrolled in water aerobics at the [facility] 3 times per week  Gabapentin  Injections: Piriformis Botox injection, cluneal nerve block, rhomboid lidocaine " injection (all via Dr. Medina, PM&R)    Oswestry (IAM) Questionnaire        8/7/2024     8:08 AM   OSWESTRY DISABILITY INDEX   Count 9   Sum 23   Oswestry Score (%) 51.11 %          Neck Disability Index (NDI) Questionnaire        8/7/2024     8:15 AM   Neck Disability Index (NDI)   Neck Disability Index: Count 9   NDI: Total Score = SUM (points for all 10 findings) Incomplete   Neck Disability in Percent = (Total Score) / 50 * 100 Incomplete           PROMIS-10 Scores  Global Mental Health Score: (P) 15  Global Physical Health Score: (P) 12  PROMIS TOTAL - SUBSCORES: (P) 27    ROS: A 12-point review of systems was completed and is negative except for otherwise noted above in the history of present illness.    Med Hx:  Past Medical History:   Diagnosis Date     Asthma      Back pain      Flatback syndrome      GERD (gastroesophageal reflux disease)      H/O systemic lupus erythematosus (SLE) (H)      HLD (hyperlipidemia)      HTN (hypertension)      Pseudoarthrosis of lumbar spine        Surg Hx:  Past Surgical History:   Procedure Laterality Date     BUNIONECTOMY Bilateral      DENTAL SURGERY      extraction     HYSTERECTOMY  1985    Same time as lap minoo, appy and fibroid removal     IR DISC ASPIRATION INJECTION  5/21/2024     L2-5 TRANSFORAMINAL LUMBAR INTERBODY FUSION L5-S1 POSTERIOR LUMBAR INTERBODY FUSION  03/08/2016     L5-S1 pseudoarthrosis revision with BMP and iliac screws  11/07/2017     LAPAROSCOPIC APPENDECTOMY  1985    Same time as lap minoo, appy and fibroid removal     LAPAROSCOPIC CHOLECYSTECTOMY  1985    Same time as lap minoo, appy and fibroid removal     LAPAROSCOPY TUBAL LIGATION       LAPAROTOMY UTERUS FIBROID TUMOR RESECTION/MYOMECTOMY  1985    Same time as lap minoo, appy and fibroid removal     OPTICAL TRACKING SYSTEM FUSION SACRAL ILIAC N/A 1/7/2020    Procedure: SacroIliac joint fusion with  bedrock;  Surgeon: Segundo Samuels MD;  Location: [facility] OR     OPTICAL TRACKING SYSTEM FUSION  SPINE POSTERIOR LUMBAR THREE+ LEVELS N/A 1/7/2020    Procedure: Lumbar 1 to pelvis revision transforaminal lumbar interbody fusion, pedicle subtraction osteotomies Lumbar 4, Smith-Flowers osteotomy Lumbar 1- Lumbar 2;  Surgeon: Segundo Samuels MD;  Location: [facility] OR     SINUS SURGERY  1988    nasal polyps       Allergies:  Allergies   Allergen Reactions     Atropine Other (See Comments)     Talwin atropine combination medication  Cardiac arrest  Talwin atropine combination medication  Cardiac arrest  Other reaction(s): cardiac arrest, itchy, short of breath     Cefaclor Nausea and Vomiting, Rash and Anaphylaxis     Pentazocine Other (See Comments)     Talwin atropine combination medication caused cardiac arrest.  Talwin atropine combination medication caused cardiac arrest.  Other reaction(s): cardiac arrest     Naproxen Headache, Muscle Pain (Myalgia), Nausea and Vomiting and Hives     Tolerates ibuprofen.     Penicillins Other (See Comments) and Unknown     Childhood reaction. Patient unsure of reaction.       Seasonal Allergies Other (See Comments)     Corn, springtime allergens causing sneezing.     Tuberculin Purified Protein Derivative Other (See Comments)     swelling  Other reaction(s): Edema  swelling     Erythromycin Rash     Other reaction(s): Other       Meds:  Current Outpatient Medications   Medication Sig Dispense Refill     Apoaequorin (PREVAGEN EXTRA STRENGTH PO) Take by mouth daily       aspirin 81 MG EC tablet Take 81 mg by mouth 2 times daily (with meals)       atorvastatin (LIPITOR) 40 MG tablet Take 40 mg by mouth At Bedtime        calcium carbonate (OS-SARWAT) 1500 (600 Ca) MG tablet Take 600 mg by mouth daily       cyclobenzaprine (FLEXERIL) 5 MG tablet Take 5 mg by mouth 3 times daily as needed for muscle spasms       furosemide (LASIX) 20 MG tablet Take 1 tablet by mouth every morning        gabapentin (NEURONTIN) 400 MG capsule Take 1 cap Three Times per day as needed for nerve pain  (Patient taking differently: 800 mg 3 times daily Take 2 cap Three Times per day as needed for nerve pain) 180 capsule 0     HYDROcodone-acetaminophen (NORCO) 5-325 MG tablet Take 1 tablet by mouth every 6 hours as needed for severe pain       meloxicam (MOBIC) 7.5 MG tablet TAKE 1 TABLET BY MOUTH TWICE DAILY AS NEEDED       Multiple Vitamins-Minerals (MULTIVITAL) TABS Take 1 tablet by mouth every morning       Multiple Vitamins-Minerals (PRESERVISION AREDS 2 PO)        pantoprazole (PROTONIX) 40 MG EC tablet Take 40 mg by mouth 2 times daily        predniSONE (DELTASONE) 10 MG tablet Take 10 mg by mouth daily       valACYclovir (VALTREX) 500 MG tablet Take 1 tablet by mouth as needed   3     Current Facility-Administered Medications   Medication Dose Route Frequency Provider Last Rate Last Admin     botulinum toxin type A (BOTOX) 100 units injection 100 Units  100 Units Intramuscular Q90 Days Veronica Medina MD   100 Units at 08/19/24 1731       Fam Hx:  Family History   Problem Relation Age of Onset     Cardiovascular Mother         PCI with stent     Cerebrovascular Disease Mother         aneurysm      Cardiovascular Sister         MI        P/S Hx:  Social History     Tobacco Use     Smoking status: Never     Smokeless tobacco: Never   Substance Use Topics     Alcohol use: Yes     Comment: occasional glass wine          Physical Exam:   Constitutional - Patient is healthy, well-nourished and appears stated age. A&Ox3.     There were no vitals taken for this visit.   Respiratory - Patient is breathing normally and in no respiratory distress.   Skin - No suspicious rashes or lesions.   Psychiatric - Normal mood and affect.   Cardiovascular - Extremities warm and well perfused   Eyes - Visual acuity is normal to the written word. Wears glasses   ENT - Hearing intact to the spoken word.   GI - No abdominal distention.   Musculoskeletal -   Normal upright posture.    Wide based gait without assistive device, legs  swing.  No antalgia.  unable to perform tandem gait with ease.  Mild instability with Romberg's.  Full, painless ROM bilateral shoulder joints          Cervical spine:    Appearance -  normal neck posture, able to maintain horizontal gaze.  No deformity, no skin lesions or surgical scars.    Palpation - Non-tender to palpation midline and paraspinals    ROM - Full and painless    Motor -     UPPER EXTREMITY Left Right   Shoulder abduction (C5) 5/5 5/5   Elbow flexion (C6) 5/5 5/5   Wrist extension (C6)  5/5 5/5   Wrist flexion (C7) 5/5 5/5   Elbow extension (C7) 5/5 5/5    strength (C8) 5/5 5/5   Finger ab/adduction (T1) 5/5 5/5         Special Tests -      Lhermitte's Test -negative     Spurling's Test -negative     Bianchi's Test -positive on right, negative on left     Rhomberg's Test: Mild instability          Thoracic Spine:    Palpation - tender to palpation midline thoracic and bilateral paraspinals, quadratus lumborum    Strength/ROM - deferred    Upper extremity:  Full pulses, pink nailbeds, good capillary / refill.  (-) atrophy / asymmetry.  Biceps DTR +3 bilateral  Triceps DTR +2 bilateral  Brachioradialis +3 bilateral  Patellar +2 right, +1 left  Achilles +2 right, +1 left  0 beats clonus bilaterally  Sensation intact to light touch bilateral upper and lower extremities    5/5 bilateral hip flexion, knee extension, knee flexion, dorsiflexion, plantarflexion.  4/5 right EHL, 5/5 left EHL        Imagin2024 XR cervical spine AP/lateral views: Multilevel degenerative changes.  Mild C2-3 spondylolisthesis.  No evidence of fracture.  Mild upper thoracic scoliosis curve.  Cervical sagittal measurements:  T1 slope 30  C2 slope 17  C2-T1 lord 13  C2-C7 SVA +1.8cm    3/27/2024 MRI cervical spine without contrast: Multilevel degenerative changes.C4-5 and C3-4 cord shape change without severe stenosis.  No significant foraminal stenosis.    3/27/2024 MRI thoracic spine without contrast: No significant  spinal canal stenosis.  Advanced degenerative changes at T12-L1, T10-11, T9-10 disc spaces.    Impression:   78/F RHD patient with   1.  Balance difficulties of unclear etiolgy.   2.  Relatively mild cervical canal stenosis, but in setting of kyphosis/hypolordosis may be causing cervical myelopathy, with MRI showing some cord shape changes.  3.  Cervical mylelopathy with UE hyperreflexia, (+) R Bianchi's. NO axial neck pain or radicular symptoms.   4.  PJK s/p L1-pelvis fusion; L4 PSO; bilateral Bedrock (1/7/20 Abril/João).  5.  Multilevel thoracic spondylosis.    Plan:   Reviewed patient's own Imaging studies with her and her daughter today.  She does have degenerative changes in the cervical spine and thoracic spine.  There is no severe stenosis in the cervical spine, but there are a few levels where there is some cord shape change.  We also reviewed that in the thoracic spine there is evidence of advanced degeneration, which there was some concern for spondylodiscitis, but aspiration showed no evidence of infection.  We reviewed that balance is mediated by various systems (brain, vestibular, spinal cord, ect).  It is possible that the cervical spine cord shape changes could be contributing to balance difficulties.  Recommend continued workup with outside neurologist.  We will continue to monitor symptoms at this time.  She can follow-up in 6 months, or sooner if symptoms decline. Patient and daughter agreeable with plan    - Continue with home physical therapy exercise program.  If desired, we can place an order for repeat balance physical therapy.    - Encouraged to continue with water aerobics exercise program.    - Follow up with neurologist as previously scheduled  - Follow-up as scheduled with Dr. Samuels in October.    Follow-up with Dr. Jin in 6 months with repeat XR cervical AP/lateral views    All questions and concerns were answered to the patient's apparent satisfaction before leaving the clinic.      Savanah Toledo (ronni Mckenzie), NAY    Attestation:  I (Dr. Rodolfo Jin - Spine Surgeon) have personally evaluated patient with NAY Toledo, and agree with findings and plan outlined in the note, which I also edited.  I discussed at length with the patient/family, explained the nature of spinal condition, and formulated workup and/or treatment plan together.  All questions were answered to the best of my ability and to patient's apparent satisfaction.    40 minutes spent on the date of the encounter doing chart review/review of outside records/review of test results/interpretation of tests/patient visit/documentation/discussion with other provider(s)/discussion with patient and family.    Rodolfo Jin MD    Orthopaedic Spine Surgery  Dept Orthopaedic Surgery, [facility]  [phone] [facility], [phone] pager  [email or web address]           Again, thank you for allowing me to participate in the care of your patient.        Sincerely,        Rodolfo Jin MD

## 2024-08-20 NOTE — PROGRESS NOTES
REASON FOR CONSULTATION: Consult (NEW CERVICAL SPINE)     Spine Surgery Hx:   03/08/2016 - TLIF L2-5, posterior instrumentation L2-S1 (Dr.John Sanchez) for degenerative spondylolisthesis L4-5, herniated disc L5-S1, multilevel degenerative disc disease and facet arthropathy [Expedium pedicle screws from [employer]; Medtronic MagniFuse allograft morselized autograft and Ladi Elite allograft ]  11/07/2017 - exploration of lumbar fusion, pseudoarthrosis revision L5-S1, bilateral pelvic fixation (Dr. 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  [Implants: Medtronic Solera screw system; Capstone Control cages; SI-Bone iFuse 3D x2].      REFERRING PHYSICIAN: Segundo Samuels   PCP:Salvatore Rios    History of Present Illness:    right-handed 78 year old female who presents today for evaluation of balance difficulties, referred by Dr. Samuels.  Patient last seen by Dr. Samuels in January 2024 at which time she had some concerns about balance issues.  Assessment was that of superior cluneal nerve irritation, quadratus lumborum muscle spasm, and plan to see neurology for balance assessment.  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 Morris Saucedo, for balance issues on 4/24/2024.  Assessment was unclear etiology of gait disturbance, plan for therapy, B12 levels, follow-up.    Today, presents with her daughter for evaluation of balance difficulties.  She reports that over the past year, she has had difficulty with balance, and believes that it is gradually worsening with time.  No significant progression  "over the past 5 months; however, she thinks that some of her balance issues improved after her left knee replacement.  She uses a cane while walking to help with balance.  Her daughter reports that she walks tilted off to the right side.  Patient endorses \"catching corners\" while walking, but denies falls.  Patient denies significant neck pain.  Admits to stiffness in the neck, but this is not bothersome to her.  She denies pain radiating into the arms.  Denies numbness/tingling in the arms.  Denies arm or hand weakness.  Denies significant clumsiness or fine motor movement difficulties in the hands, but admits to chronic stiffness in the hands/fingers due to arthritis.  She does have some right shoulder pain if she lays on that side for too long, or if she does too many exercises with water aerobics.  Denies changes to bowel and bladder function.  Denies lower extremity weakness.      Her main area of complaint is in the thoracic/lumbar spine.  Similar to previous with Dr. Samuels.  She did have the CT-guided biopsy and cultures done which were negative.  She follows with Dr. Medina and gets right piriformis Botox injections, right cluneal nerve block which she says helps significantly with her usual buttock pain for about 2 months before she needs to go and have it repeated.  She had a right rhomboid lidocaine injection with him yesterday which was not very helpful.  She has a follow-up with Dr. Samuels in October.     Previous treament:   Physical therapy: Recently completed for left TKA rehabilitation/balance  Enrolled in water aerobics at the [facility] 3 times per week  Gabapentin  Injections: Piriformis Botox injection, cluneal nerve block, rhomboid lidocaine injection (all via Dr. Medina, PM&R)    Oswestry (IAM) Questionnaire        8/7/2024     8:08 AM   OSWESTRY DISABILITY INDEX   Count 9   Sum 23   Oswestry Score (%) 51.11 %          Neck Disability Index (NDI) Questionnaire        8/7/2024     8:15 AM   Neck " Disability Index (NDI)   Neck Disability Index: Count 9   NDI: Total Score = SUM (points for all 10 findings) Incomplete   Neck Disability in Percent = (Total Score) / 50 * 100 Incomplete           PROMIS-10 Scores  Global Mental Health Score: (P) 15  Global Physical Health Score: (P) 12  PROMIS TOTAL - SUBSCORES: (P) 27    ROS: A 12-point review of systems was completed and is negative except for otherwise noted above in the history of present illness.    Med Hx:  Past Medical History:   Diagnosis Date    Asthma     Back pain     Flatback syndrome     GERD (gastroesophageal reflux disease)     H/O systemic lupus erythematosus (SLE) (H)     HLD (hyperlipidemia)     HTN (hypertension)     Pseudoarthrosis of lumbar spine        Surg Hx:  Past Surgical History:   Procedure Laterality Date    BUNIONECTOMY Bilateral     DENTAL SURGERY      extraction    HYSTERECTOMY  1985    Same time as lap minoo, appy and fibroid removal    IR DISC ASPIRATION INJECTION  5/21/2024    L2-5 TRANSFORAMINAL LUMBAR INTERBODY FUSION L5-S1 POSTERIOR LUMBAR INTERBODY FUSION  03/08/2016    L5-S1 pseudoarthrosis revision with BMP and iliac screws  11/07/2017    LAPAROSCOPIC APPENDECTOMY  1985    Same time as lap minoo, appy and fibroid removal    LAPAROSCOPIC CHOLECYSTECTOMY  1985    Same time as lap minoo, appy and fibroid removal    LAPAROSCOPY TUBAL LIGATION      LAPAROTOMY UTERUS FIBROID TUMOR RESECTION/MYOMECTOMY  1985    Same time as lap minoo, appy and fibroid removal    OPTICAL TRACKING SYSTEM FUSION SACRAL ILIAC N/A 1/7/2020    Procedure: SacroIliac joint fusion with  bedrock;  Surgeon: Segundo Samuels MD;  Location: [facility] OR    OPTICAL TRACKING SYSTEM FUSION SPINE POSTERIOR LUMBAR THREE+ LEVELS N/A 1/7/2020    Procedure: Lumbar 1 to pelvis revision transforaminal lumbar interbody fusion, pedicle subtraction osteotomies Lumbar 4, Smith-Flowers osteotomy Lumbar 1- Lumbar 2;  Surgeon: Segundo Samuels MD;  Location: [facility] OR    SINUS  SURGERY  1988    nasal polyps       Allergies:  Allergies   Allergen Reactions    Atropine Other (See Comments)     Talwin atropine combination medication  Cardiac arrest  Talwin atropine combination medication  Cardiac arrest  Other reaction(s): cardiac arrest, itchy, short of breath    Cefaclor Nausea and Vomiting, Rash and Anaphylaxis    Pentazocine Other (See Comments)     Talwin atropine combination medication caused cardiac arrest.  Talwin atropine combination medication caused cardiac arrest.  Other reaction(s): cardiac arrest    Naproxen Headache, Muscle Pain (Myalgia), Nausea and Vomiting and Hives     Tolerates ibuprofen.    Penicillins Other (See Comments) and Unknown     Childhood reaction. Patient unsure of reaction.      Seasonal Allergies Other (See Comments)     Corn, springtime allergens causing sneezing.    Tuberculin Purified Protein Derivative Other (See Comments)     swelling  Other reaction(s): Edema  swelling    Erythromycin Rash     Other reaction(s): Other       Meds:  Current Outpatient Medications   Medication Sig Dispense Refill    Apoaequorin (PREVAGEN EXTRA STRENGTH PO) Take by mouth daily      aspirin 81 MG EC tablet Take 81 mg by mouth 2 times daily (with meals)      atorvastatin (LIPITOR) 40 MG tablet Take 40 mg by mouth At Bedtime       calcium carbonate (OS-SARWAT) 1500 (600 Ca) MG tablet Take 600 mg by mouth daily      cyclobenzaprine (FLEXERIL) 5 MG tablet Take 5 mg by mouth 3 times daily as needed for muscle spasms      furosemide (LASIX) 20 MG tablet Take 1 tablet by mouth every morning       gabapentin (NEURONTIN) 400 MG capsule Take 1 cap Three Times per day as needed for nerve pain (Patient taking differently: 800 mg 3 times daily Take 2 cap Three Times per day as needed for nerve pain) 180 capsule 0    HYDROcodone-acetaminophen (NORCO) 5-325 MG tablet Take 1 tablet by mouth every 6 hours as needed for severe pain      meloxicam (MOBIC) 7.5 MG tablet TAKE 1 TABLET BY MOUTH  TWICE DAILY AS NEEDED      Multiple Vitamins-Minerals (MULTIVITAL) TABS Take 1 tablet by mouth every morning      Multiple Vitamins-Minerals (PRESERVISION AREDS 2 PO)       pantoprazole (PROTONIX) 40 MG EC tablet Take 40 mg by mouth 2 times daily       predniSONE (DELTASONE) 10 MG tablet Take 10 mg by mouth daily      valACYclovir (VALTREX) 500 MG tablet Take 1 tablet by mouth as needed   3     Current Facility-Administered Medications   Medication Dose Route Frequency Provider Last Rate Last Admin    botulinum toxin type A (BOTOX) 100 units injection 100 Units  100 Units Intramuscular Q90 Days Veronica Medina MD   100 Units at 08/19/24 1731       Fam Hx:  Family History   Problem Relation Age of Onset    Cardiovascular Mother         PCI with stent    Cerebrovascular Disease Mother         aneurysm     Cardiovascular Sister         MI        P/S Hx:  Social History     Tobacco Use    Smoking status: Never    Smokeless tobacco: Never   Substance Use Topics    Alcohol use: Yes     Comment: occasional glass wine          Physical Exam:   Constitutional - Patient is healthy, well-nourished and appears stated age. A&Ox3.     There were no vitals taken for this visit.   Respiratory - Patient is breathing normally and in no respiratory distress.   Skin - No suspicious rashes or lesions.   Psychiatric - Normal mood and affect.   Cardiovascular - Extremities warm and well perfused   Eyes - Visual acuity is normal to the written word. Wears glasses   ENT - Hearing intact to the spoken word.   GI - No abdominal distention.   Musculoskeletal -   Normal upright posture.    Wide based gait without assistive device, legs swing.  No antalgia.  unable to perform tandem gait with ease.  Mild instability with Romberg's.  Full, painless ROM bilateral shoulder joints          Cervical spine:    Appearance -  normal neck posture, able to maintain horizontal gaze.  No deformity, no skin lesions or surgical scars.    Palpation -  Non-tender to palpation midline and paraspinals    ROM - Full and painless    Motor -     UPPER EXTREMITY Left Right   Shoulder abduction (C5) 5/5 5/5   Elbow flexion (C6) 5/5 5/5   Wrist extension (C6)  5/5 5/5   Wrist flexion (C7) 5/5 5/5   Elbow extension (C7) 5/5 5/5    strength (C8) 5/5 5/5   Finger ab/adduction (T1) 5/5 5/5         Special Tests -      Lhermitte's Test -negative     Spurling's Test -negative     Bianchi's Test -positive on right, negative on left     Rhomberg's Test: Mild instability          Thoracic Spine:    Palpation - tender to palpation midline thoracic and bilateral paraspinals, quadratus lumborum    Strength/ROM - deferred    Upper extremity:  Full pulses, pink nailbeds, good capillary / refill.  (-) atrophy / asymmetry.  Biceps DTR +3 bilateral  Triceps DTR +2 bilateral  Brachioradialis +3 bilateral  Patellar +2 right, +1 left  Achilles +2 right, +1 left  0 beats clonus bilaterally  Sensation intact to light touch bilateral upper and lower extremities    5/5 bilateral hip flexion, knee extension, knee flexion, dorsiflexion, plantarflexion.  4/5 right EHL, 5/5 left EHL        Imagin2024 XR cervical spine AP/lateral views: Multilevel degenerative changes.  Mild C2-3 spondylolisthesis.  No evidence of fracture.  Mild upper thoracic scoliosis curve.  Cervical sagittal measurements:  T1 slope 30  C2 slope 17  C2-T1 lord 13  C2-C7 SVA +1.8cm    3/27/2024 MRI cervical spine without contrast: Multilevel degenerative changes.C4-5 and C3-4 cord shape change without severe stenosis.  No significant foraminal stenosis.    3/27/2024 MRI thoracic spine without contrast: No significant spinal canal stenosis.  Advanced degenerative changes at T12-L1, T10-11, T9-10 disc spaces.    Impression:   78/F RHD patient with   1.  Balance difficulties of unclear etiolgy.   2.  Relatively mild cervical canal stenosis, but in setting of kyphosis/hypolordosis may be causing cervical myelopathy, with  MRI showing some cord shape changes.  3.  Cervical mylelopathy with UE hyperreflexia, (+) R Bianchi's. NO axial neck pain or radicular symptoms.   4.  PJK s/p L1-pelvis fusion; L4 PSO; bilateral Bedrock (1/7/20 Abril/João).  5.  Multilevel thoracic spondylosis.    Plan:   Reviewed patient's own Imaging studies with her and her daughter today.  She does have degenerative changes in the cervical spine and thoracic spine.  There is no severe stenosis in the cervical spine, but there are a few levels where there is some cord shape change.  We also reviewed that in the thoracic spine there is evidence of advanced degeneration, which there was some concern for spondylodiscitis, but aspiration showed no evidence of infection.  We reviewed that balance is mediated by various systems (brain, vestibular, spinal cord, ect).  It is possible that the cervical spine cord shape changes could be contributing to balance difficulties.  Recommend continued workup with outside neurologist.  We will continue to monitor symptoms at this time.  She can follow-up in 6 months, or sooner if symptoms decline. Patient and daughter agreeable with plan    - Continue with home physical therapy exercise program.  If desired, we can place an order for repeat balance physical therapy.    - Encouraged to continue with water aerobics exercise program.    - Follow up with neurologist as previously scheduled  - Follow-up as scheduled with Dr. Samuels in October.    Follow-up with Dr. Jin in 6 months with repeat XR cervical AP/lateral views    All questions and concerns were answered to the patient's apparent satisfaction before leaving the clinic.     Savanah Toledo (ronni Mckenzie), NAY    Attestation:  I (Dr. Rodolfo Jin - Spine Surgeon) have personally evaluated patient with NAY Toledo, and agree with findings and plan outlined in the note, which I also edited.  I discussed at length with the patient/family, explained the nature of spinal  condition, and formulated workup and/or treatment plan together.  All questions were answered to the best of my ability and to patient's apparent satisfaction.    40 minutes spent on the date of the encounter doing chart review/review of outside records/review of test results/interpretation of tests/patient visit/documentation/discussion with other provider(s)/discussion with patient and family.    Rodolfo Jin MD    Orthopaedic Spine Surgery  Dept Orthopaedic Surgery, [facility]  [phone] [facility], [phone] pager  [email or web address]

## 2024-08-21 RX ORDER — TRIAMCINOLONE ACETONIDE 40 MG/ML
40 INJECTION, SUSPENSION INTRA-ARTICULAR; INTRAMUSCULAR ONCE
Status: COMPLETED | OUTPATIENT
Start: 2024-08-21 | End: 2024-08-21

## 2024-08-21 RX ADMIN — TRIAMCINOLONE ACETONIDE 40 MG: 40 INJECTION, SUSPENSION INTRA-ARTICULAR; INTRAMUSCULAR at 17:00

## 2024-08-28 ENCOUNTER — TRANSFERRED RECORDS (OUTPATIENT)
Dept: HEALTH INFORMATION MANAGEMENT | Facility: CLINIC | Age: 79
End: 2024-08-28
Payer: MEDICARE

## 2024-09-04 ENCOUNTER — TELEPHONE (OUTPATIENT)
Dept: NEUROLOGY | Facility: CLINIC | Age: 79
End: 2024-09-04
Payer: MEDICARE

## 2024-09-04 NOTE — TELEPHONE ENCOUNTER
Fax of patient received by writer and laced in provider folder for review.  US results   ROSA Kurtz., SOFIE ([facility])

## 2024-09-26 DIAGNOSIS — Z98.1 S/P SPINAL FUSION: Primary | ICD-10-CM

## 2024-10-09 ENCOUNTER — ANCILLARY PROCEDURE (OUTPATIENT)
Dept: GENERAL RADIOLOGY | Facility: CLINIC | Age: 79
End: 2024-10-09
Attending: ORTHOPAEDIC SURGERY
Payer: MEDICARE

## 2024-10-09 ENCOUNTER — OFFICE VISIT (OUTPATIENT)
Dept: ORTHOPEDICS | Facility: CLINIC | Age: 79
End: 2024-10-09
Payer: MEDICARE

## 2024-10-09 VITALS — BODY MASS INDEX: 35.15 KG/M2 | WEIGHT: 191 LBS | HEIGHT: 62 IN

## 2024-10-09 DIAGNOSIS — M54.14 THORACIC RADICULOPATHY: ICD-10-CM

## 2024-10-09 DIAGNOSIS — M47.814 ARTHROPATHY OF THORACIC FACET JOINT: Primary | ICD-10-CM

## 2024-10-09 DIAGNOSIS — Z98.1 S/P SPINAL FUSION: ICD-10-CM

## 2024-10-09 PROCEDURE — 72082 X-RAY EXAM ENTIRE SPI 2/3 VW: CPT | Performed by: STUDENT IN AN ORGANIZED HEALTH CARE EDUCATION/TRAINING PROGRAM

## 2024-10-09 PROCEDURE — 72170 X-RAY EXAM OF PELVIS: CPT | Performed by: RADIOLOGY

## 2024-10-09 PROCEDURE — 99214 OFFICE O/P EST MOD 30 MIN: CPT | Performed by: ORTHOPAEDIC SURGERY

## 2024-10-09 PROCEDURE — 77073 BONE LENGTH STUDIES: CPT | Performed by: STUDENT IN AN ORGANIZED HEALTH CARE EDUCATION/TRAINING PROGRAM

## 2024-10-09 NOTE — PROGRESS NOTES
Spine Surgery Hx:   03/08/2016 - TLIF L2-5, posterior instrumentation L2-S1 (Dr.John Sanchez) for degenerative spondylolisthesis L4-5, herniated disc L5-S1, multilevel degenerative disc disease and facet arthropathy [Expedium pedicle screws from [employer]; Medtronic MagniFuse allograft morselized autograft and Ladi Elite allograft ]  11/07/2017 - exploration of lumbar fusion, pseudoarthrosis revision L5-S1, bilateral pelvic fixation (Dr. 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  [Implants: Medtronic Solera screw system; Capstone Control cages; SI-Bone iFuse 3D x2].    Spine Surgery Follow Up Visit    Subjective  Carmelita Mina is a 78 year old female who presents today for follow-up after her L1 to pelvis fusion with known adjacent segment kyphosis. Seen by Dr. Jin for her neck who recommended continued therapy and no surgical intervention on her cervical spine at this time.  She follows with neurology in [place], Dr. Morris Saucedo for gait disturbances and was diagnosed with unclear etiology of gait disturbance with recommendation to continue physical therapy and follow-up in the neurology clinic.    She continues to have gait imbalance which she thinks is getting worse over time.  She also has a right lower thoracic pain which can wrap around into her abdomen.  This is especially painful when she leans back in a chair.  There was a workup and ultrasound which showed a right sided lipoma in the paraspinal area.  She completed physical therapy and is now doing aqua therapy 3 times a week.  She denies any difficulty with fine motor function of her hands.     Visual Analog Scale (VAS) Questionnaire        10/9/2024     8:44 AM   VISUAL ANALOG PAIN SCALE   Back Pain  "Scale 0-10 4   Right leg pain 0   Left leg pain 0   Neck Pain Scale 0-10 0   Right arm pain 0   Left arm pain 0      Oswestry (IAM) Questionnaire        10/4/2024     5:08 PM   OSWESTRY DISABILITY INDEX   Count 9   Sum 9   Oswestry Score (%) 20 %       Physical Exam  Vitals: Ht 1.562 m (5' 1.5\")   Wt 86.6 kg (191 lb)   BMI 35.50 kg/m    Constitutional: Patient is healthy, well-nourished and appears stated age.  Respiratory: Patient is breathing normally and in no respiratory distress.  Skin: No suspicious rashes or lesions.   Gait: Non-antalgic gait without use of assistive devices. unable to tandem gait without difficulty.   Neuro: romberg negative. Bianchi negative. Rapid alternating movements of hands intact.   Spine: Pain localizes to the right T10-T11 level    Imaging  We independently reviewed and interpreted the following imaging at this clinic visit which were also reviewed with patient  MRI thoracic spine, dated 4/4/2024  Severe degenerative changes with abnormal hyperintense signal in the disc at T9-T10, T10-T11, T12 and L1.  Fluid signal in the right T10-T11 facet.  There is ligamentum flavum hypertrophy but no severe central stenosis.    Xrays EOS scoliosis AP/lat, dated 10/9/2024   XR hedrick pelvis  Stable postoperative changes of L1 to pelvis fusion with bilateral sacroiliac joint implants and dual preeti construction.  Stable compression deformity of T12 and unchanged kyphosis above the construct at the superjacent levels compared to prior x-rays.      Assessment:  78/F RHD patient with   history of L1 to pelvis fusion with stable PJK, increasing right thoracic pain which could be related to right T10-T11 facet arthropathy with fluid signal  2.  Balance difficulties of unclear etiology, cannot rule out thoracic myelopathy  3.  Relatively mild cervical canal stenosis, but in setting of kyphosis/hypolordosis may be causing cervical myelopathy, with MRI showing some cord shape changes.  4.  Cervical " mylelopathy with UE hyperreflexia, (+) R Bianchi's. NO axial neck pain or radicular symptoms.     Plan:  Update thoracic MRI with MAR to rule out thoracic myelopathy.  R T10-T11 facet steroid injection  Follow up with Dr. Samuels after the above.     All questions and concerns were answered to the patient's apparent satisfaction before leaving the clinic. We used the patient's imaging, diagrams, models to explain the pathophysiology of their disease as well as surgical and non-surgical treatment options. The patient was also seen by Dr. Samuels who formulated and is in agreement with the above plan.        Deandra Cox PA-C   Orthopedic Spine Surgery    I saw and evaluated Britt's today.  Her tandem gait appears mildly to moderately ataxic.  The exact etiology of this is unclear.  In further investigating what she has going on she has right lower thoracic sided pain that she says radiates perhaps in a circumferential pattern in her rib type pathway.  Reviewing her most recent MRI which is a little bit old at this point shows T10-11 right sided fluid within the facet joint.  This could well correspond to the site of her pain.  Her focal kyphosis above her upper instrumented vertebra does not appear to have changed.  The prior MRI has some artifact in the region making it a little bit hard to see but no clear spinal cord compression.  I think we should pursue a 2 pronged approach.  We should get a new MRI.  This should be a 1.5 Betty magnet with metallic artifact reduction sequence.  I would also like to get a right T10-11 facet injection.  This should probably be a facet injection rather than a medial branch block.  I would then like to see her back and see what these findings are and then we can talk about strategies moving forward.    My total contact time for this visit including image ordering, independent image interpretation, face-to-face evaluation, documentation was greater than 30 minutes.    Segundo Abraham  MD Abril    Addendum  Apparently the spine  team is having trouble getting prior auth for the facet block.

## 2024-10-09 NOTE — LETTER
10/9/2024      Carmelita Mina  [id number] [facility] [address]      Dear Colleague,    Thank you for referring your patient, Carmelita Mina, to the [facility] [place]. Please see a copy of my visit note below.    Spine Surgery Hx:   03/08/2016 - TLIF L2-5, posterior instrumentation L2-S1 (Dr.John Sanchez) for degenerative spondylolisthesis L4-5, herniated disc L5-S1, multilevel degenerative disc disease and facet arthropathy [Expedium pedicle screws from [employer]; Medtronic MagniFuse allograft morselized autograft and Ladi Elite allograft ]  11/07/2017 - exploration of lumbar fusion, pseudoarthrosis revision L5-S1, bilateral pelvic fixation (Dr. 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  [Implants: Xormistronic Solera screw system; Capstone Control cages; SI-Bone iFuse 3D x2].    Spine Surgery Follow Up Visit    Subjective  Carmelita Mina is a 78 year old female who presents today for follow-up after her L1 to pelvis fusion with known adjacent segment kyphosis. Seen by Dr. Jin for her neck who recommended continued therapy and no surgical intervention on her cervical spine at this time.  She [facility], Dr. Morris Saucedo for gait disturbances and was diagnosed with unclear etiology of gait disturbance with recommendation to continue physical therapy and follow-up in the neurology clinic.    She continues to have gait imbalance which she thinks is getting worse over time.  She also has a right lower thoracic pain which can wrap around into her abdomen.  This is especially painful when she leans back in a chair.  There was a workup and ultrasound which showed a right sided lipoma in the paraspinal area.  " She completed physical therapy and is now doing aqua therapy 3 times a week.  She denies any difficulty with fine motor function of her hands.     Visual Analog Scale (VAS) Questionnaire        10/9/2024     8:44 AM   VISUAL ANALOG PAIN SCALE   Back Pain Scale 0-10 4   Right leg pain 0   Left leg pain 0   Neck Pain Scale 0-10 0   Right arm pain 0   Left arm pain 0      Oswestry (IAM) Questionnaire        10/4/2024     5:08 PM   OSWESTRY DISABILITY INDEX   Count 9   Sum 9   Oswestry Score (%) 20 %       Physical Exam  Vitals: Ht 1.562 m (5' 1.5\")   Wt 86.6 kg (191 lb)   BMI 35.50 kg/m    Constitutional: Patient is healthy, well-nourished and appears stated age.  Respiratory: Patient is breathing normally and in no respiratory distress.  Skin: No suspicious rashes or lesions.   Gait: Non-antalgic gait without use of assistive devices. unable to tandem gait without difficulty.   Neuro: romberg negative. Bianchi negative. Rapid alternating movements of hands intact.   Spine: Pain localizes to the right T10-T11 level    Imaging  We independently reviewed and interpreted the following imaging at this clinic visit which were also reviewed with patient  MRI thoracic spine, dated 4/4/2024  Severe degenerative changes with abnormal hyperintense signal in the disc at T9-T10, T10-T11, T12 and L1.  Fluid signal in the right T10-T11 facet.  There is ligamentum flavum hypertrophy but no severe central stenosis.    Xrays EOS scoliosis AP/lat, dated 10/9/2024   XR hedrick pelvis  Stable postoperative changes of L1 to pelvis fusion with bilateral sacroiliac joint implants and dual preeti construction.  Stable compression deformity of T12 and unchanged kyphosis above the construct at the superjacent levels compared to prior x-rays.      Assessment:  78/F RHD patient with   history of L1 to pelvis fusion with stable PJK, increasing right thoracic pain which could be related to right T10-T11 facet arthropathy with fluid signal  2.  " Balance difficulties of unclear etiology, cannot rule out thoracic myelopathy  3.  Relatively mild cervical canal stenosis, but in setting of kyphosis/hypolordosis may be causing cervical myelopathy, with MRI showing some cord shape changes.  4.  Cervical mylelopathy with UE hyperreflexia, (+) R Bianchi's. NO axial neck pain or radicular symptoms.     Plan:  Update thoracic MRI with MAR to rule out thoracic myelopathy.  R T10-T11 facet steroid injection  Follow up with Dr. Samuels after the above.     All questions and concerns were answered to the patient's apparent satisfaction before leaving the clinic. We used the patient's imaging, diagrams, models to explain the pathophysiology of their disease as well as surgical and non-surgical treatment options. The patient was also seen by Dr. Samuels who formulated and is in agreement with the above plan.        Deandra Cox PA-C   Orthopedic Spine Surgery    I saw and evaluated Britt's today.  Her tandem gait appears mildly to moderately ataxic.  The exact etiology of this is unclear.  In further investigating what she has going on she has right lower thoracic sided pain that she says radiates perhaps in a circumferential pattern in her rib type pathway.  Reviewing her most recent MRI which is a little bit old at this point shows T10-11 right sided fluid within the facet joint.  This could well correspond to the site of her pain.  Her focal kyphosis above her upper instrumented vertebra does not appear to have changed.  The prior MRI has some artifact in the region making it a little bit hard to see but no clear spinal cord compression.  I think we should pursue a 2 pronged approach.  We should get a new MRI.  This should be a 1.5 Betty magnet with metallic artifact reduction sequence.  I would also like to get a right T10-11 facet injection.  This should probably be a facet injection rather than a medial branch block.  I would then like to see her back and see what these  findings are and then we can talk about strategies moving forward.    My total contact time for this visit including image ordering, independent image interpretation, face-to-face evaluation, documentation was greater than 30 minutes.    Segundo Samuels MD      Again, thank you for allowing me to participate in the care of your patient.        Sincerely,        Segundo Samuels MD

## 2024-10-09 NOTE — NURSING NOTE
"Reason For Visit:   Chief Complaint   Patient presents with    RECHECK     RETURN SURGICAL SPINE        Primary MD: Salvatore Rios  Ref. MD: Dr Rios     ?  No  Occupation retireed.  Currently working? No.  Work status?  Retired.  Date of injury: 1/7/20  Type of injury: home.  Date of surgery: 1/7/20  Type of surgery: lumbar fusion.  Smoker: No  Request smoking cessation information: No  Ht 1.562 m (5' 1.5\")   Wt 86.6 kg (191 lb)   BMI 35.50 kg/m      Pain Assessment  0-10 Pain Scale: 4 (mid to lower back pain)  Primary Pain Location: Back    Oswestry (IAM) Questionnaire        10/4/2024     5:08 PM   OSWESTRY DISABILITY INDEX   Count 9   Sum 9   Oswestry Score (%) 20 %            Neck Disability Index (NDI) Questionnaire        8/7/2024     8:15 AM   Neck Disability Index (NDI)   Neck Disability Index: Count 9   NDI: Total Score = SUM (points for all 10 findings) Incomplete   Neck Disability in Percent = (Total Score) / 50 * 100 Incomplete              Visual Analog Pain Scale  Back Pain Scale 0-10: 4 (mid to lower back pain)  Right leg pain: 0  Left leg pain: 0  Neck Pain Scale 0-10: 0  Right arm pain: 0  Left arm pain: 0    Promis 10 Assessment        10/4/2024     5:10 PM   PROMIS 10   In general, would you say your health is: Very good   In general, would you say your quality of life is: Very good   In general, how would you rate your physical health? Very good   In general, how would you rate your mental health, including your mood and your ability to think? Very good   In general, how would you rate your satisfaction with your social activities and relationships? Very good   In general, please rate how well you carry out your usual social activities and roles Very good   To what extent are you able to carry out your everyday physical activities such as walking, climbing stairs, carrying groceries, or moving a chair? Moderately   In the past 7 days, how often have you been bothered by " emotional problems such as feeling anxious, depressed, or irritable? Rarely   In the past 7 days, how would you rate your fatigue on average? Mild   In the past 7 days, how would you rate your pain on average, where 0 means no pain, and 10 means worst imaginable pain? 3   In general, would you say your health is: 4   In general, would you say your quality of life is: 4   In general, how would you rate your physical health? 4   In general, how would you rate your mental health, including your mood and your ability to think? 4   In general, how would you rate your satisfaction with your social activities and relationships? 4   In general, please rate how well you carry out your usual social activities and roles. (This includes activities at home, at work and in your community, and responsibilities as a parent, child, spouse, employee, friend, etc.) 4   To what extent are you able to carry out your everyday physical activities such as walking, climbing stairs, carrying groceries, or moving a chair? 3   In the past 7 days, how often have you been bothered by emotional problems such as feeling anxious, depressed, or irritable? 2   In the past 7 days, how would you rate your fatigue on average? 2   In the past 7 days, how would you rate your pain on average, where 0 means no pain, and 10 means worst imaginable pain? 3   Global Mental Health Score 16   Global Physical Health Score 15   PROMIS TOTAL - SUBSCORES 31                Seven Peña MA

## 2024-10-15 ENCOUNTER — TELEPHONE (OUTPATIENT)
Dept: PHYSICAL MEDICINE AND REHAB | Facility: CLINIC | Age: 79
End: 2024-10-15
Payer: MEDICARE

## 2024-10-15 DIAGNOSIS — M47.814 ARTHROPATHY OF THORACIC FACET JOINT: Primary | ICD-10-CM

## 2024-10-15 NOTE — TELEPHONE ENCOUNTER
"Screening Questions for Radiology Injections:    Injection to be done at which interventional clinic site? [facility]    If choosing [facility] for location, please inform patient:  \"[facility] is a Hospital based clinic. Before your visit, you should check with your insurance about how it covers the charges for facility services in a hospital-based clinic.     Procedure ordered by Abril    Procedure ordered? R T10-T11 facet steroid inejction   Transforaminal Cervical RACHEL - Send to [facility] ([facility]) - No [facility] providers perform this procedure    What insurance would patient like us to bill for this procedure? Medicare/bc  IF SCHEDULING IN [place] [facility] OR SPINE PLEASE SCHEDULE AT LEAST 7-10 BUSINESS DAYS OUT SO A PA CAN BE OBTAINED  Worker's comp or MVA (motor vehicle accident) -Any injection DO NOT SCHEDULE and route to Chris Perez.    [employer] insurance - For ALL INJECTIONS DO NOT SCHEDULE and route to Lanie Overton.     ALL BCBS, [employer] and [employer] CIGNA - DO NOT SCHEDULE and route to Lanie Overton  MEDICA- ALL INJECTIONS- route to Lanie Overton    Is patient scheduled at [facility]? yes   If YES, route every encounter to [facility] SPINE CENTER CARE NAVIGATION POOL [[phone]]    Is an  needed? No     Patient has a  home? (Review Grid) YES: ok    Any chance of pregnancy? NO   If YES, do NOT schedule and route to RN pool  - Dr. Medina route to Conchita Mejias and PM&R Nurse  [[id number]]      Is patient actively being treated for cancer or immunocompromised? No  If YES, do NOT schedule and route to RN pool/ Dr. Medina's Team    Does the patient have a bleeding or clotting disorder? No   If YES, okay to schedule AND route to RN  / Dr. Medina's Team   (For any patients with platelet count <100, RN must forward to provider)    Is patient taking any Blood Thinners OR Antiplatelet medication?  No   If hold needed, do NOT schedule, route to CALI hicks/ Dr. Medina's " Team  Examples:   Blood Thinners: (Coumadin, Warfarin, Jantoven, Pradaxa, Xarelto, Eliquis, Edoxaban, Enoxaparin, Lovenox, Heparin, Arixtra, Fondaparinux or Fragmin)  Antiplatelet Medications: (Plavix, Brilinta or Effient)     Is patient taking any aspirin products (includes Excedrin and Fiorinal)? Yes - Pt takes 81mg daily; instructed to hold 0 day(s) prior to procedure.    If yes route to RN fabiola/ Dr. Medina's Team - Do not schedule    Is patient taking any GLP-1 Antagonist (hold needed for sedation patients only) No   (semaglutide (Ozempic, Wegovy), dulaglutide (Trulicity), exenatide ER (Bydureon), tirzepatide (Mounjaro), Liraglutide (Saxenda, Victoza), semaglutide (Rybelsus), Terzepatide (Zepbound)  If YES, okay to schedule AND route to RN nurse / Dr. Medina's Team      Any allergies to contrast dye, iodine, shellfish, or numbing and steroid medications? No  If YES, schedule and add allergy information to appointment notes AND route to the RN fabiola/ Dr. Medina's Team  If RACHEL and Contrast Dye / Iodine Allergy? DO NOT SCHEDULE, route to RN pool/ Dr. Medina's Team  Allergies: Atropine, Cefaclor, Pentazocine, Naproxen, Penicillins, Seasonal allergies, Tuberculin purified protein derivative, and Erythromycin     Does patient have an active infection or treated for one within the past week? No  Is patient currently taking any antibiotics or steroid medications?  No   For patients on chronic, preventative, or prophylactic antibiotics, procedures may be scheduled.   For patients on antibiotics for active or recent infection, schedule 4 days after completed.  For patients on steroid medications, schedule 4 days after completed.     Has the patient had a flu shot or any other vaccinations within the past 7 days? No  If yes, explain that for the vaccine to work best they need to:     wait 1 week before and 1 week after getting any Vaccine  wait 1 week before and 2 weeks after getting any Covid Vaccine   If patient has  concerns about the timing, send to RN pool/ Dr. Medina's Team    Does patient have an MRI/CT?  Not Applicable Include Date and Check Procedure Scheduling Grid to see if required.  Was the MRI/CT done within the last 3 years?  NA   If no route to RN Pool/ Dr. Jamess Team  If yes, where was the MRI/CT done?    Refer to PACS Transmissions list for approved external locations and route to RN Pool High Priority/ Dr. Jamess Team  If MRI was not done at approved external location do NOT schedule and route to RN pool/ Dr. Jamess Team    If patient has an imaging disc, the injection MAY be scheduled but patient must bring disc to appt or appt will be cancelled.    Is patient able to transfer to a procedure table with minimal or no assistance? Yes   If no, do NOT schedule and route to RN Pool/ Dr. Jamess Team    Procedure Specific Instructions:  If celiac plexus block, informed patient NPO for 6 hours and that it is okay to take medications with sips of water, especially blood pressure medications Not Applicable       If this is for a cervical procedure, informed patient that aspirin needs to be held for 6 days.   Not Applicable    Sedation, If Sedation is ordered for any procedure, patient must be NPO for 6 hours prior to procedure Not Applicable    If IV needed:  Do not schedule procedures requiring IV placement in the first appointment of the day or first appointment after lunch. Do NOT schedule at 0745, 0815 or 1245. ok  Instructed patient to arrive 30 minutes early for IV start if required. (Check Procedure Scheduling Grid)  Not Applicable    Reminders:  If you are started on any steroids or antibiotics between now and your appointment, you must contact us because the procedure may need to be cancelled.  Yes    As a reminder, receiving steroids can decrease your body's ability to fight infection.   Would you still like to move forward with scheduling the injection?  Yes    IV Sedation is not provided for  procedures. If oral anti-anxiety medication is needed, the patient should request this from their referring provider.    Instruct patient to arrive as directed prior to the scheduled appointment time:  If IV needed 30 minutes before appointment time     For patients 85 or older we recommend having an adult stay w/ them for the remainder of the day.     If the patient is Diabetic, remind them to bring their glucometer.      Does the patient have any questions?  NO  Lidia Perez  [facility] [place]

## 2024-10-24 ENCOUNTER — ANCILLARY PROCEDURE (OUTPATIENT)
Dept: MRI IMAGING | Facility: CLINIC | Age: 79
End: 2024-10-24
Attending: PHYSICIAN ASSISTANT
Payer: MEDICARE

## 2024-10-24 DIAGNOSIS — M54.14 THORACIC RADICULOPATHY: ICD-10-CM

## 2024-10-24 DIAGNOSIS — M47.814 ARTHROPATHY OF THORACIC FACET JOINT: ICD-10-CM

## 2024-10-24 PROCEDURE — 72157 MRI CHEST SPINE W/O & W/DYE: CPT | Mod: MG | Performed by: RADIOLOGY

## 2024-10-24 PROCEDURE — G1010 CDSM STANSON: HCPCS | Performed by: RADIOLOGY

## 2024-10-24 PROCEDURE — A9585 GADOBUTROL INJECTION: HCPCS | Performed by: RADIOLOGY

## 2024-10-24 RX ORDER — GADOBUTROL 604.72 MG/ML
10 INJECTION INTRAVENOUS ONCE
Status: COMPLETED | OUTPATIENT
Start: 2024-10-24 | End: 2024-10-24

## 2024-10-24 RX ADMIN — GADOBUTROL 8.5 ML: 604.72 INJECTION INTRAVENOUS at 15:48

## 2024-10-24 NOTE — DISCHARGE INSTRUCTIONS
MRI Contrast Discharge Instructions    The IV contrast you received today will pass out of your body in your  urine. This will happen in the next 24 hours. You will not feel this process.  Your urine will not change color.    Drink at least 4 extra glasses of water or juice today (unless your doctor  has restricted your fluids). This reduces the stress on your kidneys.  You may take your regular medicines.    If you are on dialysis: It is best to have dialysis today.    If you have a reaction: Most reactions happen right away. If you have  any new symptoms after leaving the hospital (such as hives or swelling),  call your hospital at the correct number below. Or call your family doctor.  If you have breathing distress or wheezing, call 911.    Special instructions: ***    I have read and understand the above information.    Signature:______________________________________ Date:___________    Staff:__________________________________________ Date:___________     Time:__________    [place] [facility] Departments:    ___[place]: [phone]  ___[facility]: [phone]  ___[place]: [phone] ___[facility]: [phone]  ___[facility]: [phone]  ___[facility]: [phone]  ___[phone]  ___[facility]: [phone]  ___[phone]

## 2024-10-28 ENCOUNTER — TELEPHONE (OUTPATIENT)
Dept: ORTHOPEDICS | Facility: CLINIC | Age: 79
End: 2024-10-28
Payer: MEDICARE

## 2024-10-28 DIAGNOSIS — M46.44 DISCITIS OF THORACIC REGION: Primary | ICD-10-CM

## 2024-10-28 DIAGNOSIS — M51.34 DEGENERATION OF THORACIC INTERVERTEBRAL DISC: ICD-10-CM

## 2024-10-28 NOTE — TELEPHONE ENCOUNTER
MRI shows again findings concerning for infection now worsened with epidural collection. Patient previously had aspiration of disc that showed no growth. Please have her come into Dr. Samuels's clinic on 10/30 to discuss MRI findings and recommendations for next steps. CRP/ESR/CBC orders entered. This is a chronic finding with the last disc aspiration on 5/21/24, however, patient should be counseled that if she has any fevers or weakness of legs, she should go to the emergency department.

## 2024-10-28 NOTE — TELEPHONE ENCOUNTER
I called pt & relayed the phone message from Deandra MELCHOR & pt agreed. Pt denied fever & Leg weakness.  Scheduled appt & Lab appt.  Call back prn. W.O.Deandra MELCHOR/Christine Russo RN.

## 2024-10-30 ENCOUNTER — TELEPHONE (OUTPATIENT)
Dept: ORTHOPEDICS | Facility: CLINIC | Age: 79
End: 2024-10-30

## 2024-10-30 ENCOUNTER — OFFICE VISIT (OUTPATIENT)
Dept: ORTHOPEDICS | Facility: CLINIC | Age: 79
End: 2024-10-30
Payer: MEDICARE

## 2024-10-30 ENCOUNTER — LAB (OUTPATIENT)
Dept: LAB | Facility: CLINIC | Age: 79
End: 2024-10-30
Payer: MEDICARE

## 2024-10-30 DIAGNOSIS — D17.1 LIPOMA OF TORSO: Primary | ICD-10-CM

## 2024-10-30 DIAGNOSIS — M46.44 DISCITIS OF THORACIC REGION: ICD-10-CM

## 2024-10-30 LAB
BASOPHILS # BLD AUTO: 0.1 10E3/UL (ref 0–0.2)
BASOPHILS NFR BLD AUTO: 1 %
CRP SERPL-MCNC: <3 MG/L
EOSINOPHIL # BLD AUTO: 0.2 10E3/UL (ref 0–0.7)
EOSINOPHIL NFR BLD AUTO: 3 %
ERYTHROCYTE [DISTWIDTH] IN BLOOD BY AUTOMATED COUNT: 14.3 % (ref 10–15)
ERYTHROCYTE [SEDIMENTATION RATE] IN BLOOD BY WESTERGREN METHOD: 10 MM/HR (ref 0–30)
HCT VFR BLD AUTO: 38.1 % (ref 35–47)
HGB BLD-MCNC: 11.8 G/DL (ref 11.7–15.7)
IMM GRANULOCYTES # BLD: 0 10E3/UL
IMM GRANULOCYTES NFR BLD: 0 %
LYMPHOCYTES # BLD AUTO: 1.3 10E3/UL (ref 0.8–5.3)
LYMPHOCYTES NFR BLD AUTO: 26 %
MCH RBC QN AUTO: 30 PG (ref 26.5–33)
MCHC RBC AUTO-ENTMCNC: 31 G/DL (ref 31.5–36.5)
MCV RBC AUTO: 97 FL (ref 78–100)
MONOCYTES # BLD AUTO: 0.5 10E3/UL (ref 0–1.3)
MONOCYTES NFR BLD AUTO: 9 %
NEUTROPHILS # BLD AUTO: 3.1 10E3/UL (ref 1.6–8.3)
NEUTROPHILS NFR BLD AUTO: 61 %
NRBC # BLD AUTO: 0 10E3/UL
NRBC BLD AUTO-RTO: 0 /100
PLATELET # BLD AUTO: 246 10E3/UL (ref 150–450)
RBC # BLD AUTO: 3.93 10E6/UL (ref 3.8–5.2)
WBC # BLD AUTO: 5.1 10E3/UL (ref 4–11)

## 2024-10-30 PROCEDURE — 99215 OFFICE O/P EST HI 40 MIN: CPT | Mod: GC | Performed by: ORTHOPAEDIC SURGERY

## 2024-10-30 PROCEDURE — 85652 RBC SED RATE AUTOMATED: CPT | Performed by: PATHOLOGY

## 2024-10-30 PROCEDURE — 86140 C-REACTIVE PROTEIN: CPT | Performed by: PATHOLOGY

## 2024-10-30 PROCEDURE — 36415 COLL VENOUS BLD VENIPUNCTURE: CPT | Performed by: PATHOLOGY

## 2024-10-30 PROCEDURE — 85025 COMPLETE CBC W/AUTO DIFF WBC: CPT | Performed by: PATHOLOGY

## 2024-10-30 NOTE — NURSING NOTE
Reason For Visit:   Chief Complaint   Patient presents with    RECHECK     EOS XR DONE 10-9-24. infection labs before appt.  F.U MRI THORACIC.  DISCUSS  INJECTION SCHEDULED FOR 10-31-24 THORACIC BY DR SALGADO.  Cervical cord compression & Thoracic Discitis. Balance problems       Primary MD: Salvatore Rios  Ref. MD: EST    ?  No  Occupation retireed.  Currently working? No.  Work status?  Retired.    Date of injury: 1/7/20  Type of injury: home.    Date of surgery: 1/7/20  Type of surgery: lumbar fusion.    Smoker: No  Request smoking cessation information: No    There were no vitals taken for this visit.    Pain Assessment  Patient Currently in Pain: Yes  0-10 Pain Scale: 4  Primary Pain Location: Back    Oswestry (IAM) Questionnaire        10/30/2024    12:00 PM   OSWESTRY DISABILITY INDEX   Count 9   Sum 23   Oswestry Score (%) 51.11 %            Neck Disability Index (NDI) Questionnaire        8/7/2024     8:15 AM   Neck Disability Index (NDI)   Neck Disability Index: Count 9   NDI: Total Score = SUM (points for all 10 findings) Incomplete   Neck Disability in Percent = (Total Score) / 50 * 100 Incomplete              Visual Analog Pain Scale  Back Pain Scale 0-10: 4  Right leg pain: 0  Left leg pain: 0  Neck Pain Scale 0-10: 0  Right arm pain: 0  Left arm pain: 0    Promis 10 Assessment        10/29/2024     8:11 AM   PROMIS 10   In general, would you say your health is: Good   In general, would you say your quality of life is: Very good   In general, how would you rate your physical health? Good   In general, how would you rate your mental health, including your mood and your ability to think? Very good   In general, how would you rate your satisfaction with your social activities and relationships? Very good   In general, please rate how well you carry out your usual social activities and roles Very good   To what extent are you able to carry out your everyday physical activities such as  walking, climbing stairs, carrying groceries, or moving a chair? Moderately   In the past 7 days, how often have you been bothered by emotional problems such as feeling anxious, depressed, or irritable? Rarely   In the past 7 days, how would you rate your fatigue on average? None   In the past 7 days, how would you rate your pain on average, where 0 means no pain, and 10 means worst imaginable pain? 5   In general, would you say your health is: 3    In general, would you say your quality of life is: 4    In general, how would you rate your physical health? 3    In general, how would you rate your mental health, including your mood and your ability to think? 4    In general, how would you rate your satisfaction with your social activities and relationships? 4    In general, please rate how well you carry out your usual social activities and roles. (This includes activities at home, at work and in your community, and responsibilities as a parent, child, spouse, employee, friend, etc.) 4    To what extent are you able to carry out your everyday physical activities such as walking, climbing stairs, carrying groceries, or moving a chair? 3    In the past 7 days, how often have you been bothered by emotional problems such as feeling anxious, depressed, or irritable? 2    In the past 7 days, how would you rate your fatigue on average? 1    In the past 7 days, how would you rate your pain on average, where 0 means no pain, and 10 means worst imaginable pain? 5    Global Mental Health Score 16    Global Physical Health Score 14    PROMIS TOTAL - SUBSCORES 30        Patient-reported                Loretta Brennan LPN

## 2024-10-30 NOTE — LETTER
10/30/2024      Carmelita Mina  [address] [facility] [address]      Dear Colleague,    Thank you for referring your patient, Carmelita Mina, to the [facility] [place]. Please see a copy of my visit note below.    Date of Service: Oct 30, 2024    Spine Surgery Hx:   03/08/2016 - TLIF L2-5, posterior instrumentation L2-S1 (Dr.John Sanchez) for degenerative spondylolisthesis L4-5, herniated disc L5-S1, multilevel degenerative disc disease and facet arthropathy [Expedium pedicle screws from [employer]; Medtronic MagniFuse allograft morselized autograft and Ladi Elite allograft ]  11/07/2017 - exploration of lumbar fusion, pseudoarthrosis revision L5-S1, bilateral pelvic fixation (Dr. 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  [Implants: Videum Solera screw system; Capstone Control cages; SI-Bone iFuse 3D x2].    Chief Complaint:   Chief Complaint   Patient presents with     RECHECK     EOS XR DONE 10-9-24. infection labs before appt.  F.U MRI THORACIC.  DISCUSS  INJECTION SCHEDULED FOR 10-31-24 THORACIC BY DR SALGADO.  Cervical cord compression & Thoracic Discitis. Balance problems       Interval events: Carmelita Mina is a 78 year old female who presents today in follow-up after undergoing L1 to pelvis fusion with known adjacent segment kyphosis.  She was previously seen on 10/9/2024 by Dr. Samuels.  At that time plan was to get a updated thoracic MRI with metal artifact reduction to rule out thoracic myelopathy as well as to do a right T10-T11 facet steroid injection with follow-up in 2 weeks with Dr. Samuels.  MRI was obtained which demonstrated a change at the T10-11 disc space    Today patient reports that her balance has overall  unchanged but her pain may be slightly worse, involves the mid thoracic spine but does not involve the legs.  She has been managing this with gabapentin 400 mg 3 times a day and Tylenol.  She reports that she can only stand for about 5 minutes at a time and only walk 1 block before she needs to rest.  She denies fever, chills, malaise.    Oswestry (IAM) Questionnaire        10/30/2024    12:00 PM   OSWESTRY DISABILITY INDEX   Count 9   Sum 23   Oswestry Score (%) 51.11 %     Visual Analog Scale (VAS) Questionnaire        10/30/2024    12:37 PM   VISUAL ANALOG PAIN SCALE   Back Pain Scale 0-10 4   Right leg pain 0   Left leg pain 0   Neck Pain Scale 0-10 0   Right arm pain 0   Left arm pain 0      Past medical, surgical, and social history were reviewed and updated as needed.    Physical examination:  The patient is well-developed, well nourished and in on acute distress. The patient is alert and oriented to the surroundings. Behavior is appropriate to the surroundings. Extra-ocular motions are intact. Respirations appear unlabored.     Cervical spine:    Appearance -no gross step-offs, kyphosis.    Motor -     C5: Deltoids R 5/5 and L 5/5 strength    C6: Biceps R 5/5 and L 5/5 strength     C7: Triceps R 5/5 and L 5/5 strength     C8:  R 5/5 and L 5/5 strength     T1: Dorsal interossei R 4/5 and L 4/5 strength        Sensation: intact to light touch in C5-T1       Bianchi's Test - Negative       Lumbar Spine:    Appearance - No gross stepoffs or deformities    Motor -     L2-3: Hip flexion 5/5 R and 5/5 L strength          L3/4:  Knee extension R 5/5 and L 5/5 strength         L4/5:  Foot dorsiflexion R 5/5 L 5/5 and       EHL dorsiflexion R 4/5 L 4/5 strength         S1:  Plantarflexion/Peroneal Muscles  R 5/5 and L 5/5 strength    Sensation: intact to light touch L3-S1 distribution BLE      Neurologic:      REFLEXES Left Right   Patella 1+ 1+   Ankle jerk 1+ 1+   Clonus 0 beats 0 beats     Labs: ESR, CRP, white  blood cell count were reviewed and found to be within normal limits    Radiographs: MRI of the thoracic spine obtained 10/20/2024 independently reviewed and compared to prior MRI of the thoracic spine obtained 3/27/2024.  Of note, the MRI sequences are slightly different making it challenging to compare the findings.  There is a fluid collection and discitis at the T10-11 level.  These findings were also discussed with Dr. Jt Christine of neuroradiology who felt that the findings were not suggestive of an infectious process.        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    We had a long discussion with both the patient as well as her family who are present at today's clinic visit and reviewed the imaging findings with them.  The neuroradiologist that we spoke with did not feel that the findings were due to infection.  We discussed with the patient her goals and discussed how aggressive we should be in addressing the degenerative changes that are seen on the MRI.  Counseled the patient that surgery to address this would be fairly significant and at her age there is a risk of complications.  At this time, the patient is not certain if she would like to proceed with surgery and we will continue to follow her clinical symptoms for any worsening that would suggest an infectious process rather than a degenerative one, although this seems unlikely at this time.  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 Bauer.  We will plan to have the patient follow-up as previously scheduled to further discuss surgery, she may cancel that appointment if she feels that she has made up her mind and would like to avoid or not have surgery at that time.    We also counseled the patient that in the future, if she is obtaining an MRI of  the lumbar spine, it should be done with a 1.5 vicki magnet with metal artifact reduction sequence (MARS) as the instrumentation artifact obscures fine detail with stronger MRI magnets.    Plan:  - Referral to Dr. Bauer for lipoma removal  - Follow-up scheduled for 11/13/2024 to further discuss surgery if interested, may cancel this appointment if not interested at that time    Ample time and opportunity was provided to ask questions, all of which were answered to the patient's satisfaction prior to the conclusion of the visit today.    Assessment and plan discussed with Dr. Samuels who is in agreement with the above.    Voice-to-text dictation software was utilized in the creation of this note therefore there may be unintended word substitutions, although errors are generally corrected real-time, there is the potential for a rare error to be present in the completed chart. Please do not hesitate to reach out for clarification.    Calvin Johnson MD  Orthopaedic Surgery Resident  [facility]  10/30/24    We had an extended discussion today about her condition.  The radiologist reading the last stat MRI was concerned about infection.  Of note is that she has had a previous biopsy at this level performed in the March timeframe and this was negative.  Her blood tests show no evidence of active inflammation.  I called and spoke to Dr. Jt Christine from neuroradiology.  His opinion is this looks more like progression of disc degeneration than infection.  When I look at the patient in clinic she does not appear like someone who has spondylodiscitis or an epidural abscess.  She does not appear ill.  She does have mechanical back pain.  She does have focal kyphosis and wedging of the vertebra above her prior fusion.  This is limiting to her.  We had an extended discussion today about what it would take to fix her.  I expect this would be an extension up approximately 3 levels.  We did talk about being nearly 80  years old and the increased risk of delirium from general anesthesia.  We did talk about the risks associated with surgical intervention.  At this point I do not think there is any emergency going on.  If however things change or if she decides that this is too onerous for her to continue in this way then I would be happy to undertake surgery to try to help her.  I would do this with one of my partners so that there was someone who had been part of her surgery available to follow her up longer term.  She and her  asked appropriate questions and had them answered to their apparent satisfaction.    Segundo Samuels MD          Again, thank you for allowing me to participate in the care of your patient.        Sincerely,        Segundo Samuels MD

## 2024-10-30 NOTE — TELEPHONE ENCOUNTER
What is the Concern:  Tumor  Date the concern started: 10/30  Injury related? no  Is this related to recent surgery?no  Laceration?  No  Body part affected:  lymphoma of back being referred by   Has Patient been evaluated for condition? yes  Location the patient was evaluated and treated for the condition?   [facility], Location [facility]  Who is referring provider, (name and clinic location) Dr. Samuels  What images have been done? MRI; Location and City where images were taken:     Could we send this information to you in [place] or would you prefer to receive a phone call?:   Patient would prefer a phone call   Okay to leave a detailed message?: Yes at Cell number on file:    Telephone Information:   Mobile [phone]

## 2024-10-30 NOTE — PROGRESS NOTES
Date of Service: Oct 30, 2024    Spine Surgery Hx:   03/08/2016 - TLIF L2-5, posterior instrumentation L2-S1 (Dr.John Sanchez) for degenerative spondylolisthesis L4-5, herniated disc L5-S1, multilevel degenerative disc disease and facet arthropathy [Expedium pedicle screws from [employer]; Medtronic MagniFuse allograft morselized autograft and Ladi Elite allograft ]  11/07/2017 - exploration of lumbar fusion, pseudoarthrosis revision L5-S1, bilateral pelvic fixation (Dr. 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  [Implants: Medtronic Solera screw system; [employer] Control cages; SI-Bone iFuse 3D x2].    Chief Complaint:   Chief Complaint   Patient presents with    RECHECK     EOS XR DONE 10-9-24. infection labs before appt.  F.U MRI THORACIC.  DISCUSS  INJECTION SCHEDULED FOR 10-31-24 THORACIC BY DR SALGADO.  Cervical cord compression & Thoracic Discitis. Balance problems       Interval events: Carmelita Mina is a 78 year old female who presents today in follow-up after undergoing L1 to pelvis fusion with known adjacent segment kyphosis.  She was previously seen on 10/9/2024 by Dr. Samuels.  At that time plan was to get a updated thoracic MRI with metal artifact reduction to rule out thoracic myelopathy as well as to do a right T10-T11 facet steroid injection with follow-up in 2 weeks with Dr. Samuels.  MRI was obtained which demonstrated a change at the T10-11 disc space    Today patient reports that her balance has overall unchanged but her pain may be slightly worse, involves the mid thoracic spine but does not involve the legs.  She has been managing this with gabapentin 400 mg 3 times a day and Tylenol.  She reports that she can only stand for about 5 minutes at a time and only  walk 1 block before she needs to rest.  She denies fever, chills, malaise.    Oswestry (IAM) Questionnaire        10/30/2024    12:00 PM   OSWESTRY DISABILITY INDEX   Count 9   Sum 23   Oswestry Score (%) 51.11 %     Visual Analog Scale (VAS) Questionnaire        10/30/2024    12:37 PM   VISUAL ANALOG PAIN SCALE   Back Pain Scale 0-10 4   Right leg pain 0   Left leg pain 0   Neck Pain Scale 0-10 0   Right arm pain 0   Left arm pain 0      Past medical, surgical, and social history were reviewed and updated as needed.    Physical examination:  The patient is well-developed, well nourished and in on acute distress. The patient is alert and oriented to the surroundings. Behavior is appropriate to the surroundings. Extra-ocular motions are intact. Respirations appear unlabored.     Cervical spine:    Appearance -no gross step-offs, kyphosis.    Motor -     C5: Deltoids R 5/5 and L 5/5 strength    C6: Biceps R 5/5 and L 5/5 strength     C7: Triceps R 5/5 and L 5/5 strength     C8:  R 5/5 and L 5/5 strength     T1: Dorsal interossei R 4/5 and L 4/5 strength        Sensation: intact to light touch in C5-T1       Bianchi's Test - Negative       Lumbar Spine:    Appearance - No gross stepoffs or deformities    Motor -     L2-3: Hip flexion 5/5 R and 5/5 L strength          L3/4:  Knee extension R 5/5 and L 5/5 strength         L4/5:  Foot dorsiflexion R 5/5 L 5/5 and       EHL dorsiflexion R 4/5 L 4/5 strength         S1:  Plantarflexion/Peroneal Muscles  R 5/5 and L 5/5 strength    Sensation: intact to light touch L3-S1 distribution BLE      Neurologic:      REFLEXES Left Right   Patella 1+ 1+   Ankle jerk 1+ 1+   Clonus 0 beats 0 beats     Labs: ESR, CRP, white blood cell count were reviewed and found to be within normal limits    Radiographs: MRI of the thoracic spine obtained 10/20/2024 independently reviewed and compared to prior MRI of the thoracic spine obtained 3/27/2024.  Of note, the MRI sequences are slightly  different making it challenging to compare the findings.  There is a fluid collection and discitis at the T10-11 level.  These findings were also discussed with Dr. Jt Christine of neuroradiology who felt that the findings were not suggestive of an infectious process.        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    We had a long discussion with both the patient as well as her family who are present at today's clinic visit and reviewed the imaging findings with them.  The neuroradiologist that we spoke with did not feel that the findings were due to infection.  We discussed with the patient her goals and discussed how aggressive we should be in addressing the degenerative changes that are seen on the MRI.  Counseled the patient that surgery to address this would be fairly significant and at her age there is a risk of complications.  At this time, the patient is not certain if she would like to proceed with surgery and we will continue to follow her clinical symptoms for any worsening that would suggest an infectious process rather than a degenerative one, although this seems unlikely at this time.  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 Bauer.  We will plan to have the patient follow-up as previously scheduled to further discuss surgery, she may cancel that appointment if she feels that she has made up her mind and would like to avoid or not have surgery at that time.    We also counseled the patient that in the future, if she is obtaining an MRI of the lumbar spine, it should be done with a 1.5 vicki magnet with metal artifact reduction sequence (MARS) as the instrumentation artifact obscures fine detail with stronger MRI magnets.    Plan:  - Referral to Dr. Bauer for lipoma removal  - Follow-up scheduled  for 11/13/2024 to further discuss surgery if interested, may cancel this appointment if not interested at that time    Ample time and opportunity was provided to ask questions, all of which were answered to the patient's satisfaction prior to the conclusion of the visit today.    Assessment and plan discussed with Dr. Samuels who is in agreement with the above.    Voice-to-text dictation software was utilized in the creation of this note therefore there may be unintended word substitutions, although errors are generally corrected real-time, there is the potential for a rare error to be present in the completed chart. Please do not hesitate to reach out for clarification.    Calvin Johnson MD  Orthopaedic Surgery Resident  [facility]  10/30/24    We had an extended discussion today about her condition.  The radiologist reading the last stat MRI was concerned about infection.  Of note is that she has had a previous biopsy at this level performed in the March timeframe and this was negative.  Her blood tests show no evidence of active inflammation.  I called and spoke to Dr. Jt Christine from neuroradiology.  His opinion is this looks more like progression of disc degeneration than infection.  When I look at the patient in clinic she does not appear like someone who has spondylodiscitis or an epidural abscess.  She does not appear ill.  She does have mechanical back pain.  She does have focal kyphosis and wedging of the vertebra above her prior fusion.  This is limiting to her.  We had an extended discussion today about what it would take to fix her.  I expect this would be an extension up approximately 3 levels.  We did talk about being nearly 80 years old and the increased risk of delirium from general anesthesia.  We did talk about the risks associated with surgical intervention.  At this point I do not think there is any emergency going on.  If however things change or if she decides that this is too  onerous for her to continue in this way then I would be happy to undertake surgery to try to help her.  I would do this with one of my partners so that there was someone who had been part of her surgery available to follow her up longer term.  She and her  asked appropriate questions and had them answered to their apparent satisfaction.    Segundo Samuels MD

## 2024-10-31 ENCOUNTER — RADIOLOGY INJECTION OFFICE VISIT (OUTPATIENT)
Dept: PHYSICAL MEDICINE AND REHAB | Facility: CLINIC | Age: 79
End: 2024-10-31
Attending: ORTHOPAEDIC SURGERY
Payer: MEDICARE

## 2024-10-31 ENCOUNTER — PATIENT OUTREACH (OUTPATIENT)
Dept: CARE COORDINATION | Facility: CLINIC | Age: 79
End: 2024-10-31

## 2024-10-31 VITALS
RESPIRATION RATE: 16 BRPM | HEIGHT: 62 IN | SYSTOLIC BLOOD PRESSURE: 126 MMHG | WEIGHT: 191 LBS | TEMPERATURE: 98.9 F | HEART RATE: 73 BPM | DIASTOLIC BLOOD PRESSURE: 72 MMHG | OXYGEN SATURATION: 98 % | BODY MASS INDEX: 35.15 KG/M2

## 2024-10-31 DIAGNOSIS — M47.814 ARTHROPATHY OF THORACIC FACET JOINT: ICD-10-CM

## 2024-10-31 DIAGNOSIS — M54.14 THORACIC RADICULOPATHY: ICD-10-CM

## 2024-10-31 PROCEDURE — 64490 INJ PARAVERT F JNT C/T 1 LEV: CPT | Mod: RT | Performed by: PHYSICAL MEDICINE & REHABILITATION

## 2024-10-31 RX ORDER — TRIAMCINOLONE ACETONIDE 40 MG/ML
INJECTION, SUSPENSION INTRA-ARTICULAR; INTRAMUSCULAR
Status: COMPLETED | OUTPATIENT
Start: 2024-10-31 | End: 2024-10-31

## 2024-10-31 RX ORDER — LIDOCAINE HYDROCHLORIDE 10 MG/ML
INJECTION, SOLUTION EPIDURAL; INFILTRATION; INTRACAUDAL; PERINEURAL
Status: COMPLETED | OUTPATIENT
Start: 2024-10-31 | End: 2024-10-31

## 2024-10-31 RX ADMIN — TRIAMCINOLONE ACETONIDE 20 MG: 40 INJECTION, SUSPENSION INTRA-ARTICULAR; INTRAMUSCULAR at 10:26

## 2024-10-31 RX ADMIN — LIDOCAINE HYDROCHLORIDE 1.25 ML: 10 INJECTION, SOLUTION EPIDURAL; INFILTRATION; INTRACAUDAL; PERINEURAL at 10:25

## 2024-10-31 ASSESSMENT — PAIN SCALES - GENERAL
PAINLEVEL_OUTOF10: MODERATE PAIN (4)
PAINLEVEL_OUTOF10: MILD PAIN (3)

## 2024-10-31 NOTE — PATIENT INSTRUCTIONS
DISCHARGE INSTRUCTIONS    During office hours (8:00 a.m.- 4:00 p.m.) questions or concerns may be answered  by calling Spine Center Navigation Nurses at  [phone].  Messages received after hours will be returned the following business day.      In the case of an emergency, please dial 911 or seek assistance at the nearest Emergency Room/Urgent Care facility.     All Patients:    You may experience an increase in your symptoms for the first 2 days (It may take anywhere between 2 days- 2 weeks for the steroid to have maximum effect).    You may use ice on the injection site, as frequently as 20 minutes each hour if needed.    You may take your pain medicine.    You may continue taking your regular medication after your injection. If you have had a Medial Branch Block you may resume pain medication once your pain diary is completed.    You may shower. No swimming, tub bath or hot tub for 48 hours.  You may remove your bandaid/bandage as soon as you are home.    You may resume light activities, as tolerated.    Resume your usual diet as tolerated.    If you were told to hold any blood thinning medications you may resume taking them 24 hours after your procedure as prescribed.    It is strongly advised that you do not drive for 1-3 hours post injection.    If you have had oral sedation:  Do not drive for 8 hours post injection.      If you have had IV sedation:  Do not drive for 24 hours post injection.  Do not operate hazardous machinery or make important personal/business decisions for 24 hours.      POSSIBLE STEROID SIDE EFFECTS (If steroid/cortisone was used for your procedure)    -If you experience these symptoms, it should only last for a short period    Swelling of the legs              Skin redness (flushing)     Mouth (oral) irritation   Blood sugar (glucose) levels            Sweats                    Mood changes  Headache  Sleeplessness  Weakened immune system for up to 14 days, which could increase  the risk of ping the COVID-19 virus and/or experiencing more severe symptoms of the disease, if exposed.  Decreased effectiveness of the flu vaccine if given within 2 weeks of the steroid.         POSSIBLE PROCEDURE SIDE EFFECTS  -Call the Spine Center if you are concerned  Increased Pain           Increased numbness/tingling      Nausea/Vomiting          Bruising/bleeding at site      Redness or swelling                                              Difficulty walking      Weakness           Fever greater than 100.5    *In the event of a severe headache after an epidural steroid injection that is relieved by lying down, please call the [facility] to speak with a clinical staff member*

## 2024-11-04 ENCOUNTER — PATIENT OUTREACH (OUTPATIENT)
Dept: CARE COORDINATION | Facility: CLINIC | Age: 79
End: 2024-11-04
Payer: MEDICARE

## 2024-11-05 NOTE — TELEPHONE ENCOUNTER
DIAGNOSIS: LIPOMA THORACIC SPINE   APPOINTMENT DATE: 11/06/2024   NOTES STATUS DETAILS   OFFICE NOTE from referring provider Internal 10/09/2024 - Segundo Samuels MD - [facility]  Orthopaedic Surgery   OPERATIVE REPORT Care Everywhere  Internal 03/08/2016 - TLIF L2-5, posterior instrumentation L2-S1 (Dr.John Sanchez) for degenerative spondylolisthesis L4-5, herniated disc L5-S1, multilevel degenerative disc disease and facet arthropathy [Expedium pedicle screws from [employer]; Medtronic MagniFuse allograft morselized autograft and Ladi Elite allograft ]  11/07/2017 - exploration of lumbar fusion, pseudoarthrosis revision L5-S1, bilateral pelvic fixation (Dr. 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  [Implants: Medtronic Solera screw system; [employer] Control cages; SI-Bone iFuse 3D x2].   LABS     DEXA Care Everywhere [place]:  12/28/2023 - Hip/Spine   MRI PACS Internal    [place]:  03/28/2024 - T Spine   XRAYS (IMAGES & REPORTS) PACS Internal

## 2024-11-06 ENCOUNTER — VIRTUAL VISIT (OUTPATIENT)
Dept: ORTHOPEDICS | Facility: CLINIC | Age: 79
End: 2024-11-06
Payer: MEDICARE

## 2024-11-06 ENCOUNTER — PRE VISIT (OUTPATIENT)
Dept: ORTHOPEDICS | Facility: CLINIC | Age: 79
End: 2024-11-06

## 2024-11-06 DIAGNOSIS — D17.1 LIPOMA OF TORSO: ICD-10-CM

## 2024-11-06 PROCEDURE — 99214 OFFICE O/P EST MOD 30 MIN: CPT | Mod: 95 | Performed by: ORTHOPAEDIC SURGERY

## 2024-11-06 NOTE — NURSING NOTE
Reason For Visit:   Chief Complaint   Patient presents with    Consult     Lipoma of thoracic spine area referred by Dr. Samuels         78 year old  1945      Primary MD: Salvatore Rios  Ref. MD: Dr. Samuels      There were no vitals taken for this visit.    Pain Assessment  Patient Currently in Pain: Yes  0-10 Pain Scale: 5 (3 around lump)  Primary Pain Location: Back            Miles Zulma, ATC

## 2024-11-06 NOTE — LETTER
11/6/2024      Carmelita Mina  [address] [facility] [address]      Dear Colleague,    Thank you for referring your patient, Carmelita Mina, to the [facility] [place]. Please see a copy of my visit note below.    This 78-year-old woman has noticed a lump in her back in the mid thoracic area just to the left of midline.  It is irritating when she sits against hard surface and it is also bothered by certain articles of clothing.    Over the video visit the patient was alert oriented and appropriate and in no acute distress.    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  There is no sign of anything worrisome for malignancy here.    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  She would like to have this removed.  She would like to coordinate with another minor procedure that she is having this February and possibly get these things done on the same day which would be a Monday.  That would be fine with me as long as it is fine with anesthesia and the patient's other provider.  I will place case request today and we will see if we can get this set up for the coming February.  I have answered all of the patient's questions.      Again, thank you for allowing me to participate in the care of your patient.        Sincerely,        Emmanuel Bauer MD

## 2024-11-06 NOTE — PROGRESS NOTES
This 78-year-old woman has noticed a lump in her back in the mid thoracic area just to the left of midline.  It is irritating when she sits against hard surface and it is also bothered by certain articles of clothing.    Over the video visit the patient was alert oriented and appropriate and in no acute distress.    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  There is no sign of anything worrisome for malignancy here.    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  She would like to have this removed.  She would like to coordinate with another minor procedure that she is having this February and possibly get these things done on the same day which would be a Monday.  That would be fine with me as long as it is fine with anesthesia and the patient's other provider.  I will place case request today and we will see if we can get this set up for the coming February.  I have answered all of the patient's questions.

## 2024-11-07 ENCOUNTER — TELEPHONE (OUTPATIENT)
Dept: ORTHOPEDICS | Facility: CLINIC | Age: 79
End: 2024-11-07
Payer: MEDICARE

## 2024-11-07 NOTE — TELEPHONE ENCOUNTER
Called patient to schedule surgery with Dr. Bauer, no answer. Callback number [phone] left on vm.     Tammy Peña on 11/7/2024 at 4:05 PM

## 2024-11-11 ENCOUNTER — OFFICE VISIT (OUTPATIENT)
Dept: PHYSICAL MEDICINE AND REHAB | Facility: CLINIC | Age: 79
End: 2024-11-11
Payer: MEDICARE

## 2024-11-11 DIAGNOSIS — G58.8 CLUNEAL NEUROPATHY: ICD-10-CM

## 2024-11-11 DIAGNOSIS — M62.838 OTHER MUSCLE SPASM: ICD-10-CM

## 2024-11-11 DIAGNOSIS — M62.838 MUSCLE SPASM: Primary | ICD-10-CM

## 2024-11-11 DIAGNOSIS — M79.18 PIRIFORMIS MUSCLE PAIN: ICD-10-CM

## 2024-11-11 DIAGNOSIS — Z98.1 S/P SPINAL FUSION: ICD-10-CM

## 2024-11-11 DIAGNOSIS — M62.838 SPASM OF MUSCLE: ICD-10-CM

## 2024-11-11 PROCEDURE — 76942 ECHO GUIDE FOR BIOPSY: CPT | Performed by: PHYSICAL MEDICINE & REHABILITATION

## 2024-11-11 PROCEDURE — 64642 CHEMODENERV 1 EXTREMITY 1-4: CPT | Performed by: PHYSICAL MEDICINE & REHABILITATION

## 2024-11-11 RX ORDER — BUPIVACAINE HYDROCHLORIDE 2.5 MG/ML
3 INJECTION, SOLUTION INFILTRATION; PERINEURAL ONCE
Status: COMPLETED | OUTPATIENT
Start: 2024-11-11 | End: 2024-11-11

## 2024-11-11 RX ORDER — TRIAMCINOLONE ACETONIDE 40 MG/ML
40 INJECTION, SUSPENSION INTRA-ARTICULAR; INTRAMUSCULAR ONCE
Status: COMPLETED | OUTPATIENT
Start: 2024-11-11 | End: 2024-11-11

## 2024-11-11 RX ADMIN — TRIAMCINOLONE ACETONIDE 40 MG: 40 INJECTION, SUSPENSION INTRA-ARTICULAR; INTRAMUSCULAR at 13:32

## 2024-11-11 RX ADMIN — BUPIVACAINE HYDROCHLORIDE 3 ML: 2.5 INJECTION, SOLUTION INFILTRATION; PERINEURAL at 14:35

## 2024-11-11 ASSESSMENT — PAIN SCALES - GENERAL: PAINLEVEL_OUTOF10: MILD PAIN (3)

## 2024-11-11 NOTE — NURSING NOTE
Chief Complaint   Patient presents with    Procedure     Confirmed procedure with patient     Betsy Ding, EMT

## 2024-11-11 NOTE — TELEPHONE ENCOUNTER
Called patient to schedule surgery with Dr. Bauer. Patient said being she does not live close to the [facility], she is going to check with her PCP in [place] to see if procedure can be done closer to home. Patient said she will call back should anything change. Patient provided with callback number [phone].     Tammy Peña on 11/11/2024 at 4:12 PM

## 2024-11-11 NOTE — LETTER
11/11/2024       RE: Carmelita Mina  [id number] [facility] [address]     Dear Colleague,    Thank you for referring your patient, Carmelita Mina, to the [facility] [place] [facility]. Please see a copy of my visit note below.    PROCEDURE NOTE:   Piriformis Muscle Botulinum Toxin-A (Botox) Injection and Cluneal Nerve Block Under Ultrasound Guidance    PROCEDURE DATE: 11/11/2024           PATIENT NAME: Carmelita Mina  YOB: 1945    ATTENDING PHYSICIAN: Veronica Medina MD  FELLOW/RESIDENT PHYSICIAN: None    PREOPERATIVE DIAGNOSIS:   Muscle spasm  (primary encounter diagnosis)  Piriformis syndrome of right side  Piriformis muscle pain  S/P spinal fusion  Fusion of sacral region of spine    Cluneal neuropathy   OSTOPERATIVE DIAGNOSIS: same    PROCEDURE PERFORMED:   Right Piriformis Muscle Botox Injection Under Ultrasound Guidance  Right Superior Cluneal Nerve Block Under Ultrasound Guidance    ULTRASOUND WAS USED.    INDICATIONS FOR THE PROCEDURE:  Carmelita Mina is a 79 year old female who presents with piriformis syndrome and muscle spasm and cluneal neuropathy s/p lumbosacral spinal fusion     PROCEDURE AND FINDINGS:  She was greeted in the clinic. The risk, benefits and alternatives to the procedure were again reviewed with her and informed consent was obtained and the patient agreed to proceed. A time-out was performed. Following review alternatives, benefits and risks, the procedure was carried out under sterile prep with sterile gel. The use of direct sonographic guidance was used to ensure accurate placement of the needle (rather than non-guided injection) and required to minimize the risk of bleeding or injury to nearby neurovascular structures. Images were recorded and stored.     1.First, a 5-1MHz ultrasound transducer was used to visualize the relevant structures  and determine the optimal needle path for the procedure.  1.5mL 1% lidocaine was infiltrated at the needle insertion site subcutaneously. Then, a 22 gauge 5-inch Quincke spinal needle was advanced from lateral to medial utilizing an in-plane approach in long axis, under continuous ultrasound guidance to the piriformis muscle on the right. After negative aspiration, slow injection of the treatment solution 2mL total consisting of 100 units Botox reconstituted in 2mL of preservative free normal saline was instilled into affected area and distributed along 2 locations of the muscle.     100 units of botulinum toxin was diluted 50 units/mL of preservative free saline. The following muscles were injected:    100 units per right piriformis muscle   ____________________________________    100 total units used.   The remainder (0 units) of the single-use vials were discarded.      2.Next, a 5-1MHz ultrasound transducer was used to visualize the relevant structures and determine the optimal needle path for the procedure.  1.5mL 1% lidocaine was infiltrated at the needle insertion site subcutaneously. Then, a 22 gauge 5-inch needle was advanced from lateral to medial utilizing an in-plane approach, under continuous ultrasound guidance to the right superior cluneal nerve region at the level of the iliac crest. After negative aspiration, slow injection of the treatment solution 4mL total consisting of 1mL Kenalog (40mg/mL) and 3mL of 0.25% Bupivacaine was instilled into affected area.     The tip of the needle was visualized throughout the procedure. She tolerated the procedure well, was discharged home in stable condition.     Follow-up will be in clinic 12-weeks for repeat injection.     COMPLICATIONS: None    COMMENTS:   Patient was last seen 10/31/24 for fluoroscopically guided right T10-11 facet steroid injection with 75% ongoing improvement as referred by orthopedic spine surgery.  We will see how she does with this first  intra-articular facet steroid injection.  We also discussed the pathway from diagnostic medial branch blocks to radiofrequency denervation was discussed in detail with the patient today.  Risks and benefits were discussed and all questions were answered.  The patient states understanding and may wish to proceed.  There are no contraindications.  The patient understands they will have 2 diagnostic medial branch blocks; and after each block they will be required to keep a pain diary recording their pain scores approximately every hour until the pain has returned to baseline.  During the time after the block, the patient was instructed to perform activities which typically aggravates their pain.  The patient will contact the medical spine staff after each diagnostic block to assess the amount of benefit the patient received.  If the patient has a significantly positive response to each of these diagnostic blocks, they may move forward with the radiofrequency ablation procedure.     Additionally, she has consistent response with piriformis muscle Botox injections and cluneal nerve blocks noting 70-75% improvement x 1 month and consistently with at least 50% improvement x 12 weeks with improvement in pain and functionality.  She is able to sit and stand for longer periods of time.  She also continues to participate in home exercise and pool aerobic program that she is able to much better tolerate with improvement in symptoms.    Lastly, she has a surgery scheduled for thoracic lipoma removal with orthopedic surgery.  She would like to coordinate surgery and injection treatments scheduled in February.  This would be acceptable from my standpoint and orthopedic surgery notes reviewed, as well.                 Again, thank you for allowing me to participate in the care of your patient.      Sincerely,    Veronica Medina MD

## 2024-11-11 NOTE — PROGRESS NOTES
PROCEDURE NOTE:   Piriformis Muscle Botulinum Toxin-A (Botox) Injection and Cluneal Nerve Block Under Ultrasound Guidance    PROCEDURE DATE: 11/11/2024           PATIENT NAME: Carmelita Mina  YOB: 1945    ATTENDING PHYSICIAN: Veronica Medina MD  FELLOW/RESIDENT PHYSICIAN: None    PREOPERATIVE DIAGNOSIS:   Muscle spasm  (primary encounter diagnosis)  Piriformis syndrome of right side  Piriformis muscle pain  S/P spinal fusion  Fusion of sacral region of spine    Cluneal neuropathy   OSTOPERATIVE DIAGNOSIS: same    PROCEDURE PERFORMED:   Right Piriformis Muscle Botox Injection Under Ultrasound Guidance  Right Superior Cluneal Nerve Block Under Ultrasound Guidance    ULTRASOUND WAS USED.    INDICATIONS FOR THE PROCEDURE:  Carmelita Mnia is a 79 year old female who presents with piriformis syndrome and muscle spasm and cluneal neuropathy s/p lumbosacral spinal fusion     PROCEDURE AND FINDINGS:  She was greeted in the clinic. The risk, benefits and alternatives to the procedure were again reviewed with her and informed consent was obtained and the patient agreed to proceed. A time-out was performed. Following review alternatives, benefits and risks, the procedure was carried out under sterile prep with sterile gel. The use of direct sonographic guidance was used to ensure accurate placement of the needle (rather than non-guided injection) and required to minimize the risk of bleeding or injury to nearby neurovascular structures. Images were recorded and stored.     1.First, a 5-1MHz ultrasound transducer was used to visualize the relevant structures and determine the optimal needle path for the procedure.  1.5mL 1% lidocaine was infiltrated at the needle insertion site subcutaneously. Then, a 22 gauge 5-inch Quincke spinal needle was advanced from lateral to medial utilizing an in-plane approach in long axis, under continuous ultrasound guidance to the piriformis muscle on the right. After  negative aspiration, slow injection of the treatment solution 2mL total consisting of 100 units Botox reconstituted in 2mL of preservative free normal saline was instilled into affected area and distributed along 2 locations of the muscle.     100 units of botulinum toxin was diluted 50 units/mL of preservative free saline. The following muscles were injected:    100 units per right piriformis muscle   ____________________________________    100 total units used.   The remainder (0 units) of the single-use vials were discarded.      2.Next, a 5-1MHz ultrasound transducer was used to visualize the relevant structures and determine the optimal needle path for the procedure.  1.5mL 1% lidocaine was infiltrated at the needle insertion site subcutaneously. Then, a 22 gauge 5-inch needle was advanced from lateral to medial utilizing an in-plane approach, under continuous ultrasound guidance to the right superior cluneal nerve region at the level of the iliac crest. After negative aspiration, slow injection of the treatment solution 4mL total consisting of 1mL Kenalog (40mg/mL) and 3mL of 0.25% Bupivacaine was instilled into affected area.     The tip of the needle was visualized throughout the procedure. She tolerated the procedure well, was discharged home in stable condition.     Follow-up will be in clinic 12-weeks for repeat injection.     COMPLICATIONS: None    COMMENTS:   Patient was last seen 10/31/24 for fluoroscopically guided right T10-11 facet steroid injection with 75% ongoing improvement as referred by orthopedic spine surgery.  We will see how she does with this first intra-articular facet steroid injection.  We also discussed the pathway from diagnostic medial branch blocks to radiofrequency denervation was discussed in detail with the patient today.  Risks and benefits were discussed and all questions were answered.  The patient states understanding and may wish to proceed.  There are no contraindications.   The patient understands they will have 2 diagnostic medial branch blocks; and after each block they will be required to keep a pain diary recording their pain scores approximately every hour until the pain has returned to baseline.  During the time after the block, the patient was instructed to perform activities which typically aggravates their pain.  The patient will contact the medical spine staff after each diagnostic block to assess the amount of benefit the patient received.  If the patient has a significantly positive response to each of these diagnostic blocks, they may move forward with the radiofrequency ablation procedure.     Additionally, she has consistent response with piriformis muscle Botox injections and cluneal nerve blocks noting 70-75% improvement x 1 month and consistently with at least 50% improvement x 12 weeks with improvement in pain and functionality.  She is able to sit and stand for longer periods of time.  She also continues to participate in home exercise and pool aerobic program that she is able to much better tolerate with improvement in symptoms.    Lastly, she has a surgery scheduled for thoracic lipoma removal with orthopedic surgery.  She would like to coordinate surgery and injection treatments scheduled in February.  This would be acceptable from my standpoint and orthopedic surgery notes reviewed, as well.

## 2024-11-11 NOTE — PATIENT INSTRUCTIONS
It was a pleasure seeing you today in our [facility]. We discussed the following:    INJECTION AFTERCARE    Right piriformis muscle Botox injection.    Please contact our clinic if you have any bleeding, dizziness, or any concerns related to this procedure.    You may have an increase in pain for several days following the injections, this can be expected from the procedure.     Botox may take up to 3 weeks to reduce your pain and symptoms.     Most common side effect is pain, this will improve over a few days.    Other side effects may include: fatigue, muscle pain, weakness, swelling, and bruising, these should resolve.    Please schedule a return visit for repeat injections in 90 days.    Please note that if you have an active INFECTION, FEVER, or are taking ANTIBIOTICS, we would need to reschedule your Botox injections until this has cleared. Please call the office to RESCHEDULE if this occurs.     2. Procedure performed: Right superior cluneal nerve block    Medications used today: 0.25% Bupivacaine , 1mL Kenalog (40mg/mL), 1% lidocaine     After any kind of injection, it is not uncommon to experience:  - Soreness, swelling or bruising around the injection site.  - Mild numbness, tingling or weakness around the injection site caused by the numbing medicine used before or with the injection.      It is also possible to experience the following effects associated with the specific agent after injection:  - Allergic reaction.  - Increased blood sugar levels for 10-14 days following cortisone injection. If you have diabetes and notice that your blood sugar levels have increased, notify your primary care physician.  - Increased blood pressure levels.  - Mood swings.  - Increased fluid accumulation in the injected joint.     These effects all should resolve within a day or two of your procedure.     Please note that it may take up to 14 days for the steroid (cortisone) medication to start working.       HOME CARE INSTRUCTIONS     - Limit yourself to light activity activity on the day of your procedure. Avoid lifting heavy objects, bending, stooping or twisting.   - You may shower, but please avoid swimming, hot tubs or tub baths for 24 hours following the procedure.   - Take over-the-counter pain medication (NSAIDS or Tylenol) for pain control after your procedure as needed.    - You may use ice packs for 10-15 minutes 3-4 times per day at the injection site to reduce pain and swelling after your procedure.   + Put ice in a plastic bag  + Place a towel between your skin and the ice bag  + Leave the ice on for no longer than 20 minutes each time to avoid injuring your skin or nerves  + This can be repeated 3-4 times per day for a few days after the injection     SEEK IMMEDIATE MEDICAL CARE IF:     - Pain and swelling get worse rather than better or extend beyond the injection site  - Numbness does not go away after a few hours  - Blood or fluid continues to leak from the injection site  - You experience chest pain  - You have swelling of your face or tongue  - You have trouble breathing or become dizzy  - You develop fever, chills or severe tenderness at the injection site that lasts longer than 1 day     MAKE SURE YOU:     - Understand these instructions  - Watch your condition  - Get help right away if you are not doing well or if you get worse      #.  Additionally we discussed possible repeat right thoracic facet steroid injections versus diagnostic nerve blocks and possible radiofrequency ablation.  There is additional information regarding this procedure outlined below for your review.       Medial Branch Blocks and Radiofrequency Ablation (RFA) aka Neurotomy  Back or neck pain may be due to problems with certain nerves near your spine. If so, a medial branch neurotomy can help relieve your pain. Neurotomy destroys a nerve to relieve pain or stop involuntary movements. In some cases, your doctor may give you  a nerve block to see how your body will respond to neurotomy. The treatment uses heat, cold, radiofrequency, or chemicals to destroy the nerves near a problem joint. This keeps some pain messages from traveling to your brain, and helps relieve your symptoms.   Medial branch nerves  Each vertebra in your spine has facets (flat surfaces). They touch where the vertebrae fit together. This forms a facet joint. Each facet joint has at least 2 medial branch nerves. They are part of the nerve pathway to and from each facet joint. A facet joint in your back or neck can become inflamed (swollen and irritated). Pain messages may then travel along the nerve pathway from the facet joint to your brain.     Blocking pain messages  Medial branch nerves in each facet joint send and carry messages about back or neck pain. Destroying a few of these nerves can keep certain pain messages from reaching your brain. This can help bring you relief. The relief typically lasts for months to years.   Risks and complications  Risks and complications are rare, but can include:  Infection  Increased pain, numbness, or weakness  Nerve damage  Bleeding  Failure to relieve pain  Nguyen last reviewed this educational content on 4/1/2018 2000-2021 [employer]. All rights reserved. This information is not intended as a substitute for professional medical care. Always follow your healthcare professional's instructions.

## 2024-11-15 NOTE — TELEPHONE ENCOUNTER
Patient is scheduled for surgery with Dr. Bauer.     Spoke with: Patient     Date of Surgery: 12/16/24    Location: [facility] OR     Pre op with Provider: MYA     H&P: Scheduled for a virtual visit with PAC on 12/02/24 at 4:30.     Additional imaging/appointments: Patient is scheduled for a virtual 2 week post op on 1/03/25 at 3:20.     Surgery packet: Will mail packet, confirmed with patient address in chart works best. Patient made aware arrival time for surgery will not be listed within packet.     Additional comments: MYA Peña on 11/15/2024 at 12:10 PM

## 2024-11-15 NOTE — TELEPHONE ENCOUNTER
Received message patient was ready to go forward with scheduling surgery with Dr. Bauer.   Called patient to further discuss. Patient no longer wishes to be scheduled into February however asked for next available date. Patient offered 12/16/24. Patient confirmed that date worked. Patient did however ask to call back in order to discuss PAC appointment with her daughter to confirm what date/time works best. Patient provided with callback number [phone].     Tammy Peña on 11/15/2024 at 11:54 AM

## 2024-11-18 NOTE — TELEPHONE ENCOUNTER
FUTURE VISIT INFORMATION      SURGERY INFORMATION:  Date: 12/16/24  [facility]  Surgeon:  Emmanuel Bauer MD   Anesthesia Type:  general  Procedure: Excision Left Paraspinal Tumor   Consult: virtual visit 11/6/24    RECORDS REQUESTED FROM:       Primary Care Provider: Salvatore Rios MD - [facility]    Most recent EKG+ Tracing: 3/29/24- [facility]    Most recent ECHO: 1/7/20

## 2024-11-26 ENCOUNTER — TELEPHONE (OUTPATIENT)
Dept: ORTHOPEDICS | Facility: CLINIC | Age: 79
End: 2024-11-26
Payer: MEDICARE

## 2024-11-26 ENCOUNTER — PREP FOR PROCEDURE (OUTPATIENT)
Dept: ORTHOPEDICS | Facility: CLINIC | Age: 79
End: 2024-11-26
Payer: MEDICARE

## 2024-11-26 ENCOUNTER — TRANSFERRED RECORDS (OUTPATIENT)
Dept: HEALTH INFORMATION MANAGEMENT | Facility: CLINIC | Age: 79
End: 2024-11-26
Payer: MEDICARE

## 2024-11-26 NOTE — TELEPHONE ENCOUNTER
Called Pts Daughter Darlene Veras, the one that originated the [employer] message, and informed her we reviewed the imaging and are putting in a case request to change sides from left to right

## 2024-12-01 ENCOUNTER — ANESTHESIA EVENT (OUTPATIENT)
Dept: SURGERY | Facility: CLINIC | Age: 79
End: 2024-12-01
Payer: MEDICARE

## 2024-12-02 ENCOUNTER — PRE VISIT (OUTPATIENT)
Dept: SURGERY | Facility: CLINIC | Age: 79
End: 2024-12-02

## 2024-12-02 ENCOUNTER — HOSPITAL ENCOUNTER (OUTPATIENT)
Facility: CLINIC | Age: 79
Discharge: HOME | End: 2024-12-02
Attending: ORTHOPAEDIC SURGERY | Admitting: ORTHOPAEDIC SURGERY
Payer: MEDICARE

## 2024-12-02 ENCOUNTER — ANESTHESIA (OUTPATIENT)
Dept: SURGERY | Facility: CLINIC | Age: 79
End: 2024-12-02
Payer: MEDICARE

## 2024-12-02 VITALS
OXYGEN SATURATION: 98 % | RESPIRATION RATE: 18 BRPM | SYSTOLIC BLOOD PRESSURE: 152 MMHG | WEIGHT: 191.58 LBS | BODY MASS INDEX: 35.61 KG/M2 | HEART RATE: 66 BPM | DIASTOLIC BLOOD PRESSURE: 76 MMHG | TEMPERATURE: 97.3 F

## 2024-12-02 DIAGNOSIS — M79.89 SOFT TISSUE MASS: Primary | ICD-10-CM

## 2024-12-02 LAB — GLUCOSE BLDC GLUCOMTR-MCNC: 89 MG/DL (ref 70–99)

## 2024-12-02 PROCEDURE — 370N000017 HC ANESTHESIA TECHNICAL FEE, PER MIN: Performed by: ORTHOPAEDIC SURGERY

## 2024-12-02 PROCEDURE — 258N000003 HC RX IP 258 OP 636: Performed by: NURSE ANESTHETIST, CERTIFIED REGISTERED

## 2024-12-02 PROCEDURE — 250N000011 HC RX IP 250 OP 636: Performed by: ORTHOPAEDIC SURGERY

## 2024-12-02 PROCEDURE — 250N000011 HC RX IP 250 OP 636: Mod: JZ | Performed by: PHYSICIAN ASSISTANT

## 2024-12-02 PROCEDURE — 250N000011 HC RX IP 250 OP 636: Performed by: NURSE ANESTHETIST, CERTIFIED REGISTERED

## 2024-12-02 PROCEDURE — 250N000009 HC RX 250: Performed by: NURSE ANESTHETIST, CERTIFIED REGISTERED

## 2024-12-02 PROCEDURE — 250N000009 HC RX 250: Performed by: ORTHOPAEDIC SURGERY

## 2024-12-02 PROCEDURE — 360N000075 HC SURGERY LEVEL 2, PER MIN: Performed by: ORTHOPAEDIC SURGERY

## 2024-12-02 PROCEDURE — 88304 TISSUE EXAM BY PATHOLOGIST: CPT | Mod: TC | Performed by: ORTHOPAEDIC SURGERY

## 2024-12-02 PROCEDURE — 88304 TISSUE EXAM BY PATHOLOGIST: CPT | Mod: 26 | Performed by: PATHOLOGY

## 2024-12-02 PROCEDURE — 710N000012 HC RECOVERY PHASE 2, PER MINUTE: Performed by: ORTHOPAEDIC SURGERY

## 2024-12-02 PROCEDURE — 82962 GLUCOSE BLOOD TEST: CPT

## 2024-12-02 PROCEDURE — 710N000010 HC RECOVERY PHASE 1, LEVEL 2, PER MIN: Performed by: ORTHOPAEDIC SURGERY

## 2024-12-02 PROCEDURE — 272N000001 HC OR GENERAL SUPPLY STERILE: Performed by: ORTHOPAEDIC SURGERY

## 2024-12-02 PROCEDURE — 21932 EXC BACK TUM DEEP < 5 CM: CPT | Performed by: ORTHOPAEDIC SURGERY

## 2024-12-02 PROCEDURE — 999N000141 HC STATISTIC PRE-PROCEDURE NURSING ASSESSMENT: Performed by: ORTHOPAEDIC SURGERY

## 2024-12-02 RX ORDER — FENTANYL CITRATE 50 UG/ML
INJECTION, SOLUTION INTRAMUSCULAR; INTRAVENOUS PRN
Status: DISCONTINUED | OUTPATIENT
Start: 2024-12-02 | End: 2024-12-02

## 2024-12-02 RX ORDER — OXYCODONE HYDROCHLORIDE 5 MG/1
5-10 TABLET ORAL EVERY 4 HOURS PRN
Qty: 16 TABLET | Refills: 0 | Status: SHIPPED | OUTPATIENT
Start: 2024-12-02

## 2024-12-02 RX ORDER — DEXAMETHASONE SODIUM PHOSPHATE 4 MG/ML
INJECTION, SOLUTION INTRA-ARTICULAR; INTRALESIONAL; INTRAMUSCULAR; INTRAVENOUS; SOFT TISSUE PRN
Status: DISCONTINUED | OUTPATIENT
Start: 2024-12-02 | End: 2024-12-02

## 2024-12-02 RX ORDER — LORATADINE 10 MG/1
10 TABLET ORAL DAILY
Status: ON HOLD | COMMUNITY
End: 2024-12-02

## 2024-12-02 RX ORDER — PROPOFOL 10 MG/ML
INJECTION, EMULSION INTRAVENOUS PRN
Status: DISCONTINUED | OUTPATIENT
Start: 2024-12-02 | End: 2024-12-02

## 2024-12-02 RX ORDER — AMOXICILLIN 250 MG
1-2 CAPSULE ORAL 2 TIMES DAILY
Qty: 30 TABLET | Refills: 0 | Status: SHIPPED | OUTPATIENT
Start: 2024-12-02

## 2024-12-02 RX ORDER — ONDANSETRON 2 MG/ML
INJECTION INTRAMUSCULAR; INTRAVENOUS PRN
Status: DISCONTINUED | OUTPATIENT
Start: 2024-12-02 | End: 2024-12-02

## 2024-12-02 RX ORDER — OMEGA-3 FATTY ACIDS/FISH OIL 300-1000MG
400 CAPSULE ORAL EVERY 4 HOURS PRN
Status: ON HOLD | COMMUNITY
End: 2024-12-02

## 2024-12-02 RX ORDER — ACETAMINOPHEN 325 MG/1
650 TABLET ORAL
Status: DISCONTINUED | OUTPATIENT
Start: 2024-12-02 | End: 2024-12-02 | Stop reason: HOSPADM

## 2024-12-02 RX ORDER — OXYCODONE HYDROCHLORIDE 5 MG/1
5 TABLET ORAL
Status: DISCONTINUED | OUTPATIENT
Start: 2024-12-02 | End: 2024-12-02 | Stop reason: HOSPADM

## 2024-12-02 RX ORDER — ACETAMINOPHEN 325 MG/1
650 TABLET ORAL EVERY 4 HOURS PRN
Qty: 50 TABLET | Refills: 0 | Status: SHIPPED | OUTPATIENT
Start: 2024-12-02

## 2024-12-02 RX ORDER — CLINDAMYCIN PHOSPHATE 900 MG/50ML
900 INJECTION, SOLUTION INTRAVENOUS
Status: COMPLETED | OUTPATIENT
Start: 2024-12-02 | End: 2024-12-02

## 2024-12-02 RX ORDER — LIDOCAINE HYDROCHLORIDE 20 MG/ML
INJECTION, SOLUTION INFILTRATION; PERINEURAL PRN
Status: DISCONTINUED | OUTPATIENT
Start: 2024-12-02 | End: 2024-12-02

## 2024-12-02 RX ORDER — CLINDAMYCIN PHOSPHATE 900 MG/50ML
900 INJECTION, SOLUTION INTRAVENOUS SEE ADMIN INSTRUCTIONS
Status: DISCONTINUED | OUTPATIENT
Start: 2024-12-02 | End: 2024-12-02 | Stop reason: HOSPADM

## 2024-12-02 RX ORDER — BUPIVACAINE HYDROCHLORIDE 2.5 MG/ML
INJECTION, SOLUTION INFILTRATION; PERINEURAL PRN
Status: DISCONTINUED | OUTPATIENT
Start: 2024-12-02 | End: 2024-12-02 | Stop reason: HOSPADM

## 2024-12-02 RX ORDER — SODIUM CHLORIDE, SODIUM LACTATE, POTASSIUM CHLORIDE, CALCIUM CHLORIDE 600; 310; 30; 20 MG/100ML; MG/100ML; MG/100ML; MG/100ML
INJECTION, SOLUTION INTRAVENOUS CONTINUOUS PRN
Status: DISCONTINUED | OUTPATIENT
Start: 2024-12-02 | End: 2024-12-02

## 2024-12-02 RX ORDER — MAGNESIUM HYDROXIDE 1200 MG/15ML
LIQUID ORAL PRN
Status: DISCONTINUED | OUTPATIENT
Start: 2024-12-02 | End: 2024-12-02 | Stop reason: HOSPADM

## 2024-12-02 RX ADMIN — SUGAMMADEX 100 MG: 100 INJECTION, SOLUTION INTRAVENOUS at 13:59

## 2024-12-02 RX ADMIN — ONDANSETRON 4 MG: 2 INJECTION INTRAMUSCULAR; INTRAVENOUS at 13:43

## 2024-12-02 RX ADMIN — SODIUM CHLORIDE, POTASSIUM CHLORIDE, SODIUM LACTATE AND CALCIUM CHLORIDE: 600; 310; 30; 20 INJECTION, SOLUTION INTRAVENOUS at 13:11

## 2024-12-02 RX ADMIN — PROPOFOL 150 MG: 10 INJECTION, EMULSION INTRAVENOUS at 13:15

## 2024-12-02 RX ADMIN — SUGAMMADEX 100 MG: 100 INJECTION, SOLUTION INTRAVENOUS at 14:03

## 2024-12-02 RX ADMIN — DEXAMETHASONE SODIUM PHOSPHATE 4 MG: 4 INJECTION, SOLUTION INTRAMUSCULAR; INTRAVENOUS at 13:15

## 2024-12-02 RX ADMIN — PROPOFOL 150 MCG/KG/MIN: 10 INJECTION, EMULSION INTRAVENOUS at 13:17

## 2024-12-02 RX ADMIN — FENTANYL CITRATE 25 MCG: 50 INJECTION INTRAMUSCULAR; INTRAVENOUS at 13:44

## 2024-12-02 RX ADMIN — FENTANYL CITRATE 25 MCG: 50 INJECTION INTRAMUSCULAR; INTRAVENOUS at 13:15

## 2024-12-02 RX ADMIN — Medication 50 MG: at 13:32

## 2024-12-02 RX ADMIN — LIDOCAINE HYDROCHLORIDE 60 MG: 20 INJECTION, SOLUTION INFILTRATION; PERINEURAL at 13:15

## 2024-12-02 RX ADMIN — CLINDAMYCIN PHOSPHATE 900 MG: 900 INJECTION, SOLUTION INTRAVENOUS at 13:26

## 2024-12-02 ASSESSMENT — ACTIVITIES OF DAILY LIVING (ADL)
ADLS_ACUITY_SCORE: 57

## 2024-12-02 NOTE — ANESTHESIA PROCEDURE NOTES
Airway       Patient location during procedure: OR       Procedure Start/Stop Times: 12/2/2024 1:20 PM  Staff -        Other Anesthesia Staff: Vince Nicholson       Performed By: SRNA  Consent for Airway        Urgency: elective  Indications and Patient Condition       Indications for airway management: juvenal-procedural       Induction type:intravenous       Mask difficulty assessment: 1 - vent by mask    Final Airway Details       Final airway type: endotracheal airway       Successful airway: ETT - single  Endotracheal Airway Details        ETT size (mm): 7.0       Cuffed: yes       Successful intubation technique: direct laryngoscopy       DL Blade Type: MAC 3       Grade View of Cords: 1       Adjucts: stylet       Position: Right       Measured from: lips       Secured at (cm): 20       Bite block used: None    Post intubation assessment        Placement verified by: capnometry, equal breath sounds and chest rise        Number of attempts at approach: 1       Number of other approaches attempted: 0       Secured with: tape       Ease of procedure: easy       Dentition: Intact    Medication(s) Administered   Medication Administration Time: 12/2/2024 1:20 PM

## 2024-12-02 NOTE — ANESTHESIA CARE TRANSFER NOTE
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    Anesthesia Type:   General     Note:    Oropharynx: oropharynx clear of all foreign objects and spontaneously breathing  Level of Consciousness: awake  Oxygen Supplementation: room air    Independent Airway: airway patency satisfactory and stable  Dentition: dentition unchanged  Vital Signs Stable: post-procedure vital signs reviewed and stable  Report to RN Given: handoff report given  Patient transferred to: PACU    Handoff Report: Identifed the Patient, Identified the Reponsible Provider, Reviewed the pertinent medical history, Discussed the surgical course, Reviewed Intra-OP anesthesia mangement and issues during anesthesia, Set expectations for post-procedure period and Allowed opportunity for questions and acknowledgement of understanding  Vitals:  Vitals Value Taken Time   BP     Temp     Pulse 63 12/02/24 1415   Resp 12 12/02/24 1415   SpO2 93 % 12/02/24 1415   Vitals shown include unfiled device data.    Electronically Signed By: KEAGAN Penaloza CRNA  December 2, 2024  2:15 PM

## 2024-12-02 NOTE — BRIEF OP NOTE
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  Complications: None.      Same day surgery  Pain management: OTC tylenol/NSAIDs, oral narcotic for breakthrough pain  Activity: up ad manolo  Weight bearing status: WBAT  DVT prophylaxis: Mechanical  Wound Care: Aquacel x 5 days. Okay to shower  Disposition: Home per PACU protocol      Future Appointments 12/2/2024 - 5/31/2025        Date Visit Type Length Department Provider     12/18/2024  2:45 PM POST OP MSK 15 min [facility] ORTHOPEDICS Emmanuel Bauer MD    Location Instructions:     The [facility] ([facility]) [place] with multiple transportation and parking options. You may wish to review options for  service and self-parking in more detail on the [facility] s website at [email or web address].&nbsp;         Calvin Johnson MD  Orthopaedic Surgery Resident  [facility]  Pager: [phone]  12/02/2024

## 2024-12-02 NOTE — ANESTHESIA PREPROCEDURE EVALUATION
Anesthesia Pre-Procedure Evaluation    Patient: Carmelita Mina   MRN: [id number] : 1945        Procedure : Procedure(s):  Excision Right  Paraspinal Tumor          Past Medical History:   Diagnosis Date    Asthma     Back pain     Flatback syndrome     GERD (gastroesophageal reflux disease)     H/O systemic lupus erythematosus (SLE) (H)     HLD (hyperlipidemia)     HTN (hypertension)     Pseudoarthrosis of lumbar spine       Past Surgical History:   Procedure Laterality Date    BUNIONECTOMY Bilateral     DENTAL SURGERY      extraction    HYSTERECTOMY      Same time as lap minoo, appy and fibroid removal    IR DISC ASPIRATION INJECTION  2024    L2-5 TRANSFORAMINAL LUMBAR INTERBODY FUSION L5-S1 POSTERIOR LUMBAR INTERBODY FUSION  2016    L5-S1 pseudoarthrosis revision with BMP and iliac screws  2017    LAPAROSCOPIC APPENDECTOMY  1985    Same time as lap minoo, appy and fibroid removal    LAPAROSCOPIC CHOLECYSTECTOMY  1985    Same time as lap minoo, appy and fibroid removal    LAPAROSCOPY TUBAL LIGATION      LAPAROTOMY UTERUS FIBROID TUMOR RESECTION/MYOMECTOMY  1985    Same time as lap minoo, appy and fibroid removal    OPTICAL TRACKING SYSTEM FUSION SACRAL ILIAC N/A 2020    Procedure: SacroIliac joint fusion with  bedrock;  Surgeon: Segundo Samuels MD;  Location: [facility] OR    OPTICAL TRACKING SYSTEM FUSION SPINE POSTERIOR LUMBAR THREE+ LEVELS N/A 2020    Procedure: Lumbar 1 to pelvis revision transforaminal lumbar interbody fusion, pedicle subtraction osteotomies Lumbar 4, Smith-Flowers osteotomy Lumbar 1- Lumbar 2;  Surgeon: Segundo Samuels MD;  Location: [facility] OR    SINUS SURGERY      nasal polyps      Allergies   Allergen Reactions    Atropine Other (See Comments)     Talwin atropine combination medication  Cardiac arrest  Talwin atropine combination medication  Cardiac arrest  Other reaction(s): cardiac arrest, itchy, short of breath    Cefaclor Nausea and Vomiting,  Rash and Anaphylaxis    Pentazocine Other (See Comments)     Talwin atropine combination medication caused cardiac arrest.  Talwin atropine combination medication caused cardiac arrest.  Other reaction(s): cardiac arrest    Naproxen Headache, Muscle Pain (Myalgia), Nausea and Vomiting and Hives     Tolerates ibuprofen.    Penicillins Other (See Comments) and Unknown     Childhood reaction. Patient unsure of reaction.      Seasonal Allergies Other (See Comments)     Corn, springtime allergens causing sneezing.    Tuberculin Purified Protein Derivative Other (See Comments)     swelling  Other reaction(s): Edema  swelling    Erythromycin Rash     Other reaction(s): Other      Social History     Tobacco Use    Smoking status: Never    Smokeless tobacco: Never   Substance Use Topics    Alcohol use: Yes     Comment: occasional glass wine rarely      Wt Readings from Last 1 Encounters:   12/02/24 86.9 kg (191 lb 9.3 oz)        Anesthesia Evaluation   Pt has had prior anesthetic.     No history of anesthetic complications       ROS/MED HX  ENT/Pulmonary:     (+)                      asthma                  Neurologic:       Cardiovascular:     (+)  hypertension- -   -  - -                                   (-) murmur   METS/Exercise Tolerance:     Hematologic:       Musculoskeletal:       GI/Hepatic:     (+) GERD,                   Renal/Genitourinary:       Endo:     (+)               Obesity,       Psychiatric/Substance Use:       Infectious Disease:       Malignancy:       Other:            Physical Exam    Airway        Mallampati: II   TM distance: > 3 FB   Neck ROM: full   Mouth opening: > 3 cm    Respiratory Devices and Support         Dental     Comment: Dentures and partial    (+) Removable bridges or other hardware      Cardiovascular          Rhythm and rate: regular and normal (-) no murmur    Pulmonary   pulmonary exam normal        breath sounds clear to auscultation   (-) no rhonchi        OUTSIDE LABS:  CBC:  "  Lab Results   Component Value Date    WBC 5.1 10/30/2024    WBC 8.5 01/09/2020    HGB 11.8 10/30/2024    HGB 7.9 (L) 01/10/2020    HCT 38.1 10/30/2024    HCT 26.6 (L) 01/09/2020     10/30/2024     (L) 01/09/2020     BMP:   Lab Results   Component Value Date     01/11/2020     01/10/2020    POTASSIUM 4.1 01/11/2020    POTASSIUM 4.2 01/10/2020    CHLORIDE 107 01/11/2020    CHLORIDE 109 01/10/2020    CO2 31 01/11/2020    CO2 27 01/10/2020    BUN 15 01/11/2020    BUN 16 01/10/2020    CR 0.87 01/11/2020    CR 1.02 01/10/2020    GLC 89 12/02/2024     (H) 01/11/2020     COAGS:   Lab Results   Component Value Date    PTT 24 01/07/2020    INR 1.24 (H) 01/07/2020    FIBR 196 (L) 01/07/2020     POC: No results found for: \"BGM\", \"HCG\", \"HCGS\"  HEPATIC: No results found for: \"ALBUMIN\", \"PROTTOTAL\", \"ALT\", \"AST\", \"GGT\", \"ALKPHOS\", \"BILITOTAL\", \"BILIDIRECT\", \"SPEEDY\"  OTHER:   Lab Results   Component Value Date    PH 7.38 01/07/2020    LACT 2.8 (H) 01/07/2020    SARWAT 8.4 (L) 01/11/2020    SED 10 10/30/2024       Anesthesia Plan    ASA Status:  3    NPO Status:  NPO Appropriate    Anesthesia Type: General.     - Airway: ETT   Induction: Intravenous, Propofol.   Maintenance: Balanced.        Consents    Anesthesia Plan(s) and associated risks, benefits, and realistic alternatives discussed. Questions answered and patient/representative(s) expressed understanding.     - Discussed:     - Discussed with:  Patient      - Extended Intubation/Ventilatory Support Discussed: No.      - Patient is DNR/DNI Status: No     Use of blood products discussed: No .     Postoperative Care    Pain management: IV analgesics, Oral pain medications.   PONV prophylaxis: Ondansetron (or other 5HT-3), Dexamethasone or Solumedrol     Comments:               Nicolas Heredia MD    I have reviewed the pertinent notes and labs in the chart from the past 30 days and (re)examined the patient.  Any updates or changes from those " notes are reflected in this note.

## 2024-12-02 NOTE — OP NOTE
DATE OF SURGERY: 12/2/2024    PREOPERATIVE DIAGNOSIS: Right paraspinal intramuscular lipoma    POSTOPERATIVE DIAGNOSIS: Right paraspinal intramuscular lipoma    PROCEDURE: Excision right paraspinous muscle, deep, 3 cm    SURGEON: Emmanuel Bauer MD     ASSISTANT: Calvin Johnson MD      PATIENT HISTORY: This patient has a mass in the right paraspinous muscles its bothersome with clothing wear and also sitting against most seats.  It has been growing.  She presents now to have this excised understanding the risks of infection bleeding pain numbness tingling.    DESCRIPTION OF PROCEDURE: The patient underwent successful induction of anesthesia.  She was positioned in the lateral position with the right side up and the posterior thorax was washed and sterilely prepped and draped.  We made an incision over the visible palpable mass in the right paraspinous area.  After incising skin sharply  the subcutaneous tissue was divided with cautery.  We then went through the pseudocapsule of the mass which was also the fascia of the muscle.  The lipoma was slightly adherent to a portion of the muscle so we incised a few muscle fibers in order to be sure to remove the entirety of the mass.  We then irrigated and closed.  Vicryl was used in layers in the subcutaneous tissue followed by Monocryl and the skin Steri-Strips and a sterile dry dressing.  The patient was turned supine extubated and taken to the recovery room in stable condition.  There were no complications.  I was present for all critical portions of the procedure.    Emmanuel Bauer MD

## 2024-12-03 NOTE — ANESTHESIA POSTPROCEDURE EVALUATION
Patient: Carmelita Mina    Procedure: Procedure(s):  Excision Right  Paraspinal Tumor       Anesthesia Type:  General    Note:  Disposition: Outpatient   Postop Pain Control: Uneventful            Sign Out: Well controlled pain   PONV: No   Neuro/Psych: Uneventful            Sign Out: Acceptable/Baseline neuro status   Airway/Respiratory: Uneventful            Sign Out: Acceptable/Baseline resp. status   CV/Hemodynamics: Uneventful            Sign Out: Acceptable CV status; No obvious hypovolemia; No obvious fluid overload   Other NRE: NONE   DID A NON-ROUTINE EVENT OCCUR? No           Last vitals:  Vitals Value Taken Time   /89 12/02/24 1500   Temp 36.4  C (97.5  F) 12/02/24 1413   Pulse 63 12/02/24 1503   Resp 16 12/02/24 1503   SpO2 97 % 12/02/24 1505   Vitals shown include unfiled device data.    Electronically Signed By: Nicolas Heredia MD  December 3, 2024  7:08 AM

## 2024-12-05 LAB
PATH REPORT.COMMENTS IMP SPEC: NORMAL
PATH REPORT.COMMENTS IMP SPEC: NORMAL
PATH REPORT.FINAL DX SPEC: NORMAL
PATH REPORT.GROSS SPEC: NORMAL
PATH REPORT.MICROSCOPIC SPEC OTHER STN: NORMAL
PATH REPORT.RELEVANT HX SPEC: NORMAL
PHOTO IMAGE: NORMAL

## 2024-12-18 ENCOUNTER — VIRTUAL VISIT (OUTPATIENT)
Dept: ORTHOPEDICS | Facility: CLINIC | Age: 79
End: 2024-12-18
Payer: MEDICARE

## 2024-12-18 DIAGNOSIS — D17.1 LIPOMA OF TORSO: Primary | ICD-10-CM

## 2024-12-18 PROCEDURE — 99024 POSTOP FOLLOW-UP VISIT: CPT | Mod: 95 | Performed by: ORTHOPAEDIC SURGERY

## 2024-12-18 NOTE — LETTER
12/18/2024      Carmelita Mina  [address] [facility] [address]      Dear Colleague,    Thank you for referring your patient, Carmelita Mina, to the [facility] [place]. Please see a copy of my visit note below.    This patient had a lipoma removed from paraspinous muscle.  She states that she is feeling better and more easily can sit on chairs and hard surfaces.  She notices some muscle discomfort when attempting to swim.  Her incision is healed up well.  She is aware that the risk of recurrence of this tumor is very low.  She will follow-up with us as needed.      Again, thank you for allowing me to participate in the care of your patient.        Sincerely,        Emmanuel Bauer MD

## 2024-12-18 NOTE — PROGRESS NOTES
This patient had a lipoma removed from paraspinous muscle.  She states that she is feeling better and more easily can sit on chairs and hard surfaces.  She notices some muscle discomfort when attempting to swim.  Her incision is healed up well.  She is aware that the risk of recurrence of this tumor is very low.  She will follow-up with us as needed.

## 2024-12-18 NOTE — NURSING NOTE
Reason For Visit: No chief complaint on file.      There were no vitals taken for this visit.         Louise Rodriguez CMA

## 2025-01-15 DIAGNOSIS — M46.44 DISCITIS OF THORACIC REGION: ICD-10-CM

## 2025-01-15 DIAGNOSIS — M54.14 THORACIC RADICULOPATHY: ICD-10-CM

## 2025-01-15 DIAGNOSIS — M51.34 DEGENERATION OF THORACIC INTERVERTEBRAL DISC: Primary | ICD-10-CM

## 2025-01-15 DIAGNOSIS — G95.20 CERVICAL SPINAL CORD COMPRESSION (H): ICD-10-CM

## 2025-01-15 DIAGNOSIS — M47.812 CERVICAL SPONDYLOSIS: ICD-10-CM

## 2025-01-15 DIAGNOSIS — Z98.1 S/P SPINAL FUSION: ICD-10-CM

## 2025-02-03 ENCOUNTER — OFFICE VISIT (OUTPATIENT)
Dept: PHYSICAL MEDICINE AND REHAB | Facility: CLINIC | Age: 80
End: 2025-02-03
Payer: MEDICARE

## 2025-02-03 DIAGNOSIS — M47.894 OTHER OSTEOARTHRITIS OF SPINE, THORACIC REGION: ICD-10-CM

## 2025-02-03 DIAGNOSIS — M79.18 PIRIFORMIS MUSCLE PAIN: ICD-10-CM

## 2025-02-03 DIAGNOSIS — Z98.1 S/P SPINAL FUSION: Primary | ICD-10-CM

## 2025-02-03 DIAGNOSIS — M62.838 OTHER MUSCLE SPASM: ICD-10-CM

## 2025-02-03 DIAGNOSIS — M62.838 SPASM OF MUSCLE: ICD-10-CM

## 2025-02-03 DIAGNOSIS — M62.838 MUSCLE SPASM: ICD-10-CM

## 2025-02-03 DIAGNOSIS — G58.8 CLUNEAL NEUROPATHY: ICD-10-CM

## 2025-02-03 DIAGNOSIS — M47.814 ARTHROPATHY OF THORACIC FACET JOINT: ICD-10-CM

## 2025-02-03 RX ORDER — BUPIVACAINE HYDROCHLORIDE 2.5 MG/ML
5 INJECTION, SOLUTION INFILTRATION; PERINEURAL ONCE
Status: COMPLETED | OUTPATIENT
Start: 2025-02-03 | End: 2025-02-03

## 2025-02-03 RX ORDER — TRIAMCINOLONE ACETONIDE 40 MG/ML
40 INJECTION, SUSPENSION INTRA-ARTICULAR; INTRAMUSCULAR ONCE
Status: COMPLETED | OUTPATIENT
Start: 2025-02-03 | End: 2025-02-03

## 2025-02-03 RX ADMIN — TRIAMCINOLONE ACETONIDE 40 MG: 40 INJECTION, SUSPENSION INTRA-ARTICULAR; INTRAMUSCULAR at 16:40

## 2025-02-03 RX ADMIN — BUPIVACAINE HYDROCHLORIDE 12.5 MG: 2.5 INJECTION, SOLUTION INFILTRATION; PERINEURAL at 16:40

## 2025-02-03 NOTE — PROGRESS NOTES
PROCEDURE NOTE:   Piriformis Muscle Botulinum Toxin-A (Botox) Injection - and -   Cluneal Nerve Block Under Ultrasound Guidance    PROCEDURE DATE: 2/3/2025            PATIENT NAME: Carmelita Mina  YOB: 1945    ATTENDING PHYSICIAN: Veronica Medina MD  FELLOW/RESIDENT PHYSICIAN: None    PREOPERATIVE DIAGNOSIS:   Muscle spasm  (primary encounter diagnosis)  Piriformis syndrome of right side  Piriformis muscle pain  S/P spinal fusion  Fusion of sacral region of spine    Cluneal neuropathy   OSTOPERATIVE DIAGNOSIS: same    PROCEDURE PERFORMED:   Right Piriformis Muscle Botox Injection Under Ultrasound Guidance  Right Superior Cluneal Nerve Block Under Ultrasound Guidance    ULTRASOUND WAS USED.    INDICATIONS FOR THE PROCEDURE:  Carmelita Mina is a 79 year old female who presents with piriformis syndrome and muscle spasm and cluneal neuropathy s/p lumbosacral spinal fusion     INTERVAL HISTORY:  Patient was last seen November 11, 2024 for repeat piriformis muscle Botox injection and superior cluneal nerve blocks.  She continues to endorse at least 50% improvement in pain and functionality x 12 weeks following Botox and corticosteroid injections.  She is able to continue her home exercise program, pool aerobics; also with improved sitting/standing tolerance.  She tends to have +70% improvement for the first month.  Overall, this has been consistent with her prior response and she finds meaningful benefit and improvement in quality of life.    Separately, she did undergo soft tissue, lipoma resection of the paraspinous muscles in the midthoracic region on the right in mid December with orthopedic surgery.  She is doing quite well from that standpoint and has had significant pain improvement following the surgery.    PROCEDURE AND FINDINGS:  She was greeted in the clinic. The risk, benefits and alternatives to the procedure were again reviewed with her and informed consent was obtained and the  patient agreed to proceed. A time-out was performed. Following review alternatives, benefits and risks, the procedure was carried out under sterile prep with sterile gel. The use of direct sonographic guidance was used to ensure accurate placement of the needle (rather than non-guided injection) and required to minimize the risk of bleeding or injury to nearby neurovascular structures. Images were recorded and stored.     1.First, a 5-1MHz ultrasound transducer was used to visualize the relevant structures and determine the optimal needle path for the procedure.  1mL 1% lidocaine was infiltrated at the needle insertion site subcutaneously. Then, a 22 gauge 5-inch Quincke spinal needle was advanced from lateral to medial utilizing an in-plane approach in long axis, under continuous ultrasound guidance to the piriformis muscle on the right. After negative aspiration, slow injection of the treatment solution 2mL total consisting of 100 units Botox reconstituted in 2mL of preservative free normal saline was instilled into affected area and distributed along 2 locations of the muscle.     100 units of botulinum toxin was diluted 50 units/mL of preservative free saline. The following muscles were injected:    100 units per right piriformis muscle   ____________________________________    100 total units used.   The remainder (0 units) of the single-use vials were discarded.      2. Next, a 5-1MHz ultrasound transducer was used to visualize the relevant structures and determine the optimal needle path for the procedure.  1mL 1% lidocaine was infiltrated at the needle insertion site subcutaneously. Then, a 22 gauge 5-inch needle was advanced from lateral to medial utilizing an in-plane approach, under continuous ultrasound guidance to the right superior cluneal nerve region at the level of the iliac crest. After negative aspiration, slow injection of the treatment solution 4mL total consisting of 1mL Kenalog (40mg/mL) and  3mL of 0.25% Bupivacaine was instilled into affected area.     The tip of the needle was visualized throughout the procedure. She tolerated the procedure well, was discharged home in stable condition.     Follow-up will be in clinic 12-weeks for repeat injection.     COMPLICATIONS: None    COMMENTS: Patient is also requesting repeat right T10-11 facet steroid injection which was completed October 31, 2024.  She had about 75% provide for 1 month followed by at least 50% improvement in pain and functionality up to about 2 weeks ago. I have placed an order for this procedure.

## 2025-02-03 NOTE — LETTER
2/3/2025       RE: Carmelita Mina  [address] [facility] [address]     Dear Colleague,    Thank you for referring your patient, Carmelita Mina, to the [facility] [place] at [facility]. Please see a copy of my visit note below.    PROCEDURE NOTE:   Piriformis Muscle Botulinum Toxin-A (Botox) Injection - and -   Cluneal Nerve Block Under Ultrasound Guidance    PROCEDURE DATE: 2/3/2025            PATIENT NAME: Carmelita Mina  YOB: 1945    ATTENDING PHYSICIAN: Veronica Medina MD  FELLOW/RESIDENT PHYSICIAN: None    PREOPERATIVE DIAGNOSIS:   Muscle spasm  (primary encounter diagnosis)  Piriformis syndrome of right side  Piriformis muscle pain  S/P spinal fusion  Fusion of sacral region of spine    Cluneal neuropathy   OSTOPERATIVE DIAGNOSIS: same    PROCEDURE PERFORMED:   Right Piriformis Muscle Botox Injection Under Ultrasound Guidance  Right Superior Cluneal Nerve Block Under Ultrasound Guidance    ULTRASOUND WAS USED.    INDICATIONS FOR THE PROCEDURE:  Carmelita Mina is a 79 year old female who presents with piriformis syndrome and muscle spasm and cluneal neuropathy s/p lumbosacral spinal fusion     INTERVAL HISTORY:  Patient was last seen November 11, 2024 for repeat piriformis muscle Botox injection and superior cluneal nerve blocks.  She continues to endorse at least 50% improvement in pain and functionality x 12 weeks following Botox and corticosteroid injections.  She is able to continue her home exercise program, pool aerobics; also with improved sitting/standing tolerance.  She tends to have +70% improvement for the first month.  Overall, this has been consistent with her prior response and she finds meaningful benefit and improvement in quality of life.    Separately, she did undergo soft tissue, lipoma resection of the paraspinous muscles in the midthoracic region on  the right in mid December with orthopedic surgery.  She is doing quite well from that standpoint and has had significant pain improvement following the surgery.    PROCEDURE AND FINDINGS:  She was greeted in the clinic. The risk, benefits and alternatives to the procedure were again reviewed with her and informed consent was obtained and the patient agreed to proceed. A time-out was performed. Following review alternatives, benefits and risks, the procedure was carried out under sterile prep with sterile gel. The use of direct sonographic guidance was used to ensure accurate placement of the needle (rather than non-guided injection) and required to minimize the risk of bleeding or injury to nearby neurovascular structures. Images were recorded and stored.     1.First, a 5-1MHz ultrasound transducer was used to visualize the relevant structures and determine the optimal needle path for the procedure.  1mL 1% lidocaine was infiltrated at the needle insertion site subcutaneously. Then, a 22 gauge 5-inch Quincke spinal needle was advanced from lateral to medial utilizing an in-plane approach in long axis, under continuous ultrasound guidance to the piriformis muscle on the right. After negative aspiration, slow injection of the treatment solution 2mL total consisting of 100 units Botox reconstituted in 2mL of preservative free normal saline was instilled into affected area and distributed along 2 locations of the muscle.     100 units of botulinum toxin was diluted 50 units/mL of preservative free saline. The following muscles were injected:    100 units per right piriformis muscle   ____________________________________    100 total units used.   The remainder (0 units) of the single-use vials were discarded.      2. Next, a 5-1MHz ultrasound transducer was used to visualize the relevant structures and determine the optimal needle path for the procedure.  1mL 1% lidocaine was infiltrated at the needle insertion site  subcutaneously. Then, a 22 gauge 5-inch needle was advanced from lateral to medial utilizing an in-plane approach, under continuous ultrasound guidance to the right superior cluneal nerve region at the level of the iliac crest. After negative aspiration, slow injection of the treatment solution 4mL total consisting of 1mL Kenalog (40mg/mL) and 3mL of 0.25% Bupivacaine was instilled into affected area.     The tip of the needle was visualized throughout the procedure. She tolerated the procedure well, was discharged home in stable condition.     Follow-up will be in clinic 12-weeks for repeat injection.     COMPLICATIONS: None    COMMENTS: Patient is also requesting repeat right T10-11 facet steroid injection which was completed October 31, 2024.  She had about 75% provide for 1 month followed by at least 50% improvement in pain and functionality up to about 2 weeks ago. I have placed an order for this procedure.          Again, thank you for allowing me to participate in the care of your patient.      Sincerely,    Veronica Medina MD

## 2025-02-03 NOTE — PATIENT INSTRUCTIONS
It was a pleasure seeing you today in our [facility]. We discussed the following:    #.  Ultrasound-guided right piriformis muscle Botox injection    Please contact our clinic if you have any bleeding, dizziness, or any concerns related to this procedure.    You may have an increase in pain for several days following the injections, this can be expected from the procedure.     Botox may take up to 3 weeks to reduce your pain and symptoms.     Most common side effect is pain, this will improve over a few days.    Other side effects may include: fatigue, muscle pain, weakness, swelling, and bruising, these should resolve.    Please schedule a return visit for repeat injections in 90 days.    Please note that if you have an active INFECTION, FEVER, or are taking ANTIBIOTICS, we would need to reschedule your Botox injections until this has cleared. Please call the office to RESCHEDULE if this occurs.     #. Please contact our clinic if you have any bleeding, dizziness, or any concerns related to this procedure.    You may have an increase in pain for several days following the injections, this can be expected from the procedure.     Botox may take up to 3 weeks to reduce your pain and symptoms.     Most common side effect is pain, this will improve over a few days.    Other side effects may include: fatigue, muscle pain, weakness, swelling, and bruising, these should resolve.    Please schedule a return visit for repeat injections in 90 days.    Please note that if you have an active INFECTION, FEVER, or are taking ANTIBIOTICS, we would need to reschedule your Botox injections until this has cleared. Please call the office to RESCHEDULE if this occurs.     #. INJECTION AFTERCARE     Procedure performed: Ultrasound-guided right superior cluneal nerve block    Medications used today: 1% Lidocaine , 1mL Kenalog (40mg/mL), 0.25% bupivacaine    After any kind of injection, it is not uncommon to experience:  -  Soreness, swelling or bruising around the injection site.  - Mild numbness, tingling or weakness around the injection site caused by the numbing medicine used before or with the injection.      It is also possible to experience the following effects associated with the specific agent after injection:  - Allergic reaction.  - Increased blood sugar levels for 10-14 days following cortisone injection. If you have diabetes and notice that your blood sugar levels have increased, notify your primary care physician.  - Increased blood pressure levels.  - Mood swings.  - Increased fluid accumulation in the injected joint.     These effects all should resolve within a day or two of your procedure.     Please note that it may take up to 14 days for the steroid (cortisone) medication to start working.      HOME CARE INSTRUCTIONS     - Limit yourself to light activity activity on the day of your procedure. Avoid lifting heavy objects, bending, stooping or twisting.   - You may shower, but please avoid swimming, hot tubs or tub baths for 24 hours following the procedure.   - Take over-the-counter pain medication (NSAIDS or Tylenol) for pain control after your procedure as needed.    - You may use ice packs for 10-15 minutes 3-4 times per day at the injection site to reduce pain and swelling after your procedure.   + Put ice in a plastic bag  + Place a towel between your skin and the ice bag  + Leave the ice on for no longer than 20 minutes each time to avoid injuring your skin or nerves  + This can be repeated 3-4 times per day for a few days after the injection     SEEK IMMEDIATE MEDICAL CARE IF:     - Pain and swelling get worse rather than better or extend beyond the injection site  - Numbness does not go away after a few hours  - Blood or fluid continues to leak from the injection site  - You experience chest pain  - You have swelling of your face or tongue  - You have trouble breathing or become dizzy  - You develop fever,  chills or severe tenderness at the injection site that lasts longer than 1 day     MAKE SURE YOU:     - Understand these instructions  - Watch your condition  - Get help right away if you are not doing well or if you get worse      #. [facility] Pain Injection Scheduling    An order for a Repeat Right T10-11 facet steroid injection at [facility] has been placed today.   You should receive a call from our surgery schedulers to arrange this appointment. If you do not hear from them within the next 2-4 business days, feel free to call [phone] then option 2.     Dr. Medina's Procedure Locations:     [facility] [place]   Address: [address] [place] [address]   5th floor procedure/surgical center  Procedure [phone]    [facility] [place]   Address: [address], [place] [address]   2nd floor procedure/surgical center  Clinic [phone]    [facility]   Address: [address], [place] [address]  Phone: [phone]             Follow-up: for procedure.

## 2025-02-04 ENCOUNTER — ANCILLARY PROCEDURE (OUTPATIENT)
Dept: GENERAL RADIOLOGY | Facility: CLINIC | Age: 80
End: 2025-02-04
Attending: ORTHOPAEDIC SURGERY
Payer: MEDICARE

## 2025-02-04 ENCOUNTER — OFFICE VISIT (OUTPATIENT)
Dept: ORTHOPEDICS | Facility: CLINIC | Age: 80
End: 2025-02-04
Payer: MEDICARE

## 2025-02-04 VITALS — BODY MASS INDEX: 36.63 KG/M2 | WEIGHT: 194 LBS | HEIGHT: 61 IN

## 2025-02-04 DIAGNOSIS — Z98.1 S/P SPINAL FUSION: ICD-10-CM

## 2025-02-04 DIAGNOSIS — M46.44 DISCITIS OF THORACIC REGION: ICD-10-CM

## 2025-02-04 DIAGNOSIS — M51.34 DEGENERATION OF THORACIC INTERVERTEBRAL DISC: ICD-10-CM

## 2025-02-04 DIAGNOSIS — M51.34 DEGENERATION OF THORACIC INTERVERTEBRAL DISC: Primary | ICD-10-CM

## 2025-02-04 DIAGNOSIS — M54.14 THORACIC RADICULOPATHY: ICD-10-CM

## 2025-02-04 DIAGNOSIS — R26.9 GAIT ABNORMALITY: ICD-10-CM

## 2025-02-04 DIAGNOSIS — G95.20 CERVICAL SPINAL CORD COMPRESSION (H): ICD-10-CM

## 2025-02-04 DIAGNOSIS — M47.812 CERVICAL SPONDYLOSIS: ICD-10-CM

## 2025-02-04 PROCEDURE — 72082 X-RAY EXAM ENTIRE SPI 2/3 VW: CPT | Performed by: STUDENT IN AN ORGANIZED HEALTH CARE EDUCATION/TRAINING PROGRAM

## 2025-02-04 PROCEDURE — 77073 BONE LENGTH STUDIES: CPT | Performed by: STUDENT IN AN ORGANIZED HEALTH CARE EDUCATION/TRAINING PROGRAM

## 2025-02-04 PROCEDURE — 99214 OFFICE O/P EST MOD 30 MIN: CPT | Performed by: ORTHOPAEDIC SURGERY

## 2025-02-04 NOTE — LETTER
2/4/2025      Carmelita Mina  [id number] [address]      Dear Colleague,    Thank you for referring your patient, Carmelita Mina, to the [facility]. Please see a copy of my visit note below.    In-Person visit    Spine Surgery Hx:   03/08/2016 - TLIF L2-5, posterior instrumentation L2-S1 (Dr.John Sanchez) for degenerative spondylolisthesis L4-5, herniated disc L5-S1, multilevel degenerative disc disease and facet arthropathy [Expedium pedicle screws from [employer]; Medtronic MagniFuse allograft morselized autograft and Ladi Elite allograft ]  11/07/2017 - exploration of lumbar fusion, pseudoarthrosis revision L5-S1, bilateral pelvic fixation (Dr. 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  [Implants: EletrogÃƒÂ³es Solera screw system; [employer] Control cages; SI-Bone iFuse 3D x2].  11/26/2024 - Excision right paraspinous muscle, deep, 3 cm (Dr. Emmanuel Bauer)      Last Visit: 10/30/24 with Dr. Samuels, 8/20/24 by Dr. Jin  Previous Impression:  Davin 8/20/24:  1.  Balance difficulties of unclear etiolgy.   2.  Relatively mild cervical canal stenosis, but in setting of kyphosis/hypolordosis may be causing cervical myelopathy, with MRI showing some cord shape changes.  3.  Cervical mylelopathy with UE hyperreflexia, (+) R Bianchi's. NO axial neck pain or radicular symptoms.   4.  PJK s/p L1-pelvis fusion; L4 PSO; bilateral Bedrock (1/7/20 Abril/João).  5.  Multilevel thoracic spondylosis.  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   Previous Plan:  Davin 8/20/24:  - Continue with home physical therapy exercise program.  If desired, we can  place an order for repeat balance physical therapy.    - Encouraged to continue with water aerobics exercise program.    - Follow up with neurologist as previously scheduled  - Follow-up as scheduled with Dr. Samuels in October.  Abril 10/30/24:  - Referral to Dr. Bauer for lipoma removal  - Follow-up scheduled for 11/13/2024 to further discuss surgery if interested, may cancel this appointment if not interested at that time      Subjective:  Patient is a 79 year old female who returns to clinic today to transfer care from Dr. Samuels. She is s/p L1 to pelvis fusion with known adjacent segment kyphosis.  MRI has shown fluid collection at the T10-T11 level. This was discussed with Dr. Jt Christine of neuroradiology who felt the findings did not suggest infectious process but rather degenerative. She also previously had a biopsy of this which was negative.     She has since had surgery with Dr. Bauer of thoracic lipoma resection which really significantly helped her pain. Prior to that, she wasn't able to lean back in a chair. She is also feeling better after the injections below. Her balance is worsening slightly, she is using a cane outside of her home. She has chronic weakness of right leg and walks with antalgic gait. She has seen neurology for her gait difficulties and they have not uncovered a specific reason for it. The weakness has been a gradual worsening, did not seem to start after her last surgery.  She has chronic constipation but no urinary changes. No radiculopathy symptoms.     Conservative measures:   Injections: right Piriformis Muscle Botox Injection Under Ultrasound Guidance, right Superior Cluneal Nerve Block Under Ultrasound Guidance 2/3/25 by Dr. Medina (PMR). R T10-T11 facet steroid injection 10/31/24 helped 75% for 1 month. Is planning to have this repeated  Completed PT, aquatherapy three times a week. Not doing HEP as much.   Gabapentin 800 TID, meloxicam, APAP    Oswestry (IAM)  Questionnaire        1/30/2025     6:22 PM   OSWESTRY DISABILITY INDEX   Count 9    Sum 19    Oswestry Score (%) 42.22 %        Patient-reported            Neck Disability Index (NDI) Questionnaire        1/30/2025     6:30 PM   Neck Disability Index (NDI)   Neck Disability Index: Count 10   NDI: Total Score = SUM (points for all 10 findings) 7   Neck Disability in Percent = (Total Score) / 50 * 100 14 (%)          Visual Analog Scale (VAS) Questionnaire        2/4/2025     7:54 AM   VISUAL ANALOG PAIN SCALE   Back Pain Scale 0-10 3   Right leg pain 0   Left leg pain 0   Neck Pain Scale 0-10 0   Right arm pain 0   Left arm pain 0            PROMIS-10 Scores  Global Mental Health Score: (Patient-Rptd) (P) 16  Global Physical Health Score: (Patient-Rptd) (P) 15  PROMIS TOTAL - SUBSCORES: (Patient-Rptd) (P) 31    Objective:  There were no vitals taken for this visit.   Alert, oriented x 3, cooperative, no acute distress.   Ambulates independently. Trendelenburg gait affecting the right side.   Strength: 4/5 R hip abductors, 5/5 L hip abductors, 5/5 iliopsoas, 5/5 quadriceps, 5/5 hamstrings, 5/5 anterior tibialis, 5/5 extensor hallucis brevis, 5/5 gastrocnemius. Unable to single leg stance on the right. Able to perform single leg stance on the left for a few seconds.   Neuro: DTRs +2 R patella, +1 L patella, absent ankles, no ankle clonus.  Spine no tenderness to palpation at the T10-T11. Well healed mid thoracic R scar and midline thoracolumbar scar. Lhermitte negative.   Hip exam negative, no pain with ROM, FADIR/ROSHAN neg    I watched her walk in the hallway outside the room.  Seems to me she has a right Trendelenburg gait such that during right single-leg stance phase, her left hemipelvis drops, and her trunk sways towards the right.  Does not have wide-based gait that would be more consistent with myelopathy.  Also with positive right Trendelenburg sign, such that when she stands on the right foot, her left  hemipelvis drops.  Manual motor testing of hip abduction reveals asymmetric weakness: Right 4/5, left 5/5.    Imaging:   All imaging independently reviewed and interpreted in the visit today.   EOS AP/lateral scoliosis XR 2/4/2025   Postoperative changes of L1 to pelvis fusion with bilateral SI joint fusions.  Stable severe disc height loss T10-T11 and T11-T12 with evidence of ankylosis at the anterior disc space.  Stable 30 degrees kyphosis from T10-L1 compared to the October x-rays.  Stands with hips and knees in flexion.  Head centered above the pelvis.  Multilevel disc degeneration throughout the cervical and thoracic spine.    Assessment:   79 year old female with:  1.  Balance difficulties probably 2' to R gluteus medius weakness, with Trendelenburg gait, (+) Right Trendelenburg sign, and asymmetric R hip abduction weakness (4/5 on Right) (2/4/25).   2.  Relatively mild cervical canal stenosis, cervical mylelopathy with UE hyperreflexia, (+) R Bianchi's. No axial neck pain or radicular symptoms. Imbalance does not seem to be related to myelopathy.   3.  PJK s/p L1-pelvis fusion; L4 PSO; bilateral Bedrock (1/7/20 Abril/João).  4.  Multilevel thoracic spondylosis which is most advanced T9-T12,  Biopsy 5/21/24 negative for infectious process.    Plan:   Plan to monitor symptoms for now. She will continue working with Dr Medina on injections. If any worsening weakness then asked her to call us and will obtain LE EMG and Thoracic and lumbar CT. We are unsure of the underlying cause of gluteus medius weakness. At this time, she doesn't want any surgery so will not pursue more invasive testing.    - follow up in 6 months with Dr. Jin with AP/lateral scoliosis XR.     The patient was also seen and examined by Dr. Jin, who formulated the above plan. We used their imaging and models to explain the pathophysiology and treatment options. All of their questions were answered to their full satisfaction.      Respectfully,     Deandra Cox PA-C   Orthopedic Spine Surgery    Attestation:  I (Dr. Rodolfo Jin - Spine Surgeon) have personally evaluated patient with NAY Cox, and agree with findings and plan outlined in the note, which I also edited.  I discussed at length with the patient/family, explained the nature of spinal condition, and formulated workup and/or treatment plan together.  All questions were answered to the best of my ability and to patient's apparent satisfaction.    Patient's main complaint is gait imbalance.  I watched her walk in the hallway outside the room.  Seems to me she has a right Trendelenburg gait such that during right single-leg stance phase, her left hemipelvis drops, and her trunk sways towards the right.  Does not have wide-based gait that would be more consistent with myelopathy.  Also with positive right Trendelenburg sign, such that when she stands on the right foot, her left hemipelvis drops.  Manual motor testing of hip abduction reveals asymmetric weakness: Right 4/5, left 5/5.  Thus, I suspect her gait and balance issues are more likely secondary to right hip abductor (gluteus medius) weakness.  If this was neurologic in origin, may suspect L5 radiculopathy, which in turn may be chronic as her last spinal surgery consisting of L1 to pelvis fusion was performed 5 years ago.  Of note, has not had previous hip replacement.  EOS x-rays only show mild degenerative changes in the right hip joint.    For now, patient and I agreed to simply observe.  As long as things do not progress, I believe her symptoms are manageable/tolerable.  If they do get worse, we would consider obtaining EMG and updated thoracic and lumbar CT scan.    25 minutes spent on the date of the encounter doing chart review/review of outside records/review of test results/interpretation of tests/patient visit/documentation/discussion with other provider(s)/discussion with patient and family.      Again,  thank you for allowing me to participate in the care of your patient.        Sincerely,        Rodolfo Jin MD    Electronically signed

## 2025-02-04 NOTE — PROGRESS NOTES
Case Management Discharge Note    Final Note: Home       Final Discharge Disposition Code: 01 - home or self-care   In-Person visit    Spine Surgery Hx:   03/08/2016 - TLIF L2-5, posterior instrumentation L2-S1 (Dr.John Sanchez) for degenerative spondylolisthesis L4-5, herniated disc L5-S1, multilevel degenerative disc disease and facet arthropathy [Expedium pedicle screws from [employer]; Medtronic MagniFuse allograft morselized autograft and Ladi Elite allograft ]  11/07/2017 - exploration of lumbar fusion, pseudoarthrosis revision L5-S1, bilateral pelvic fixation (Dr. 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  [Implants: Emgotronic Solera screw system; Capstone Control cages; SI-Bone iFuse 3D x2].  11/26/2024 - Excision right paraspinous muscle, deep, 3 cm (Dr. Emmanuel Bauer)      Last Visit: 10/30/24 with Dr. Samuels, 8/20/24 by Dr. Jin  Previous Impression:  Davin 8/20/24:  1.  Balance difficulties of unclear etiolgy.   2.  Relatively mild cervical canal stenosis, but in setting of kyphosis/hypolordosis may be causing cervical myelopathy, with MRI showing some cord shape changes.  3.  Cervical mylelopathy with UE hyperreflexia, (+) R Bianchi's. NO axial neck pain or radicular symptoms.   4.  PJK s/p L1-pelvis fusion; L4 PSO; bilateral Bedrock (1/7/20 Abril/João).  5.  Multilevel thoracic spondylosis.  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   Previous Plan:  Davin 8/20/24:  - Continue with home physical therapy exercise program.  If desired, we can place an order for repeat balance physical therapy.    - Encouraged to continue with water aerobics exercise program.    - Follow up with neurologist as previously scheduled  - Follow-up as scheduled with Dr. Samuels in October.  Abril 10/30/24:  - Referral  to Dr. Bauer for lipoma removal  - Follow-up scheduled for 11/13/2024 to further discuss surgery if interested, may cancel this appointment if not interested at that time      Subjective:  Patient is a 79 year old female who returns to clinic today to transfer care from Dr. Samuels. She is s/p L1 to pelvis fusion with known adjacent segment kyphosis.  MRI has shown fluid collection at the T10-T11 level. This was discussed with Dr. Jt Christine of neuroradiology who felt the findings did not suggest infectious process but rather degenerative. She also previously had a biopsy of this which was negative.     She has since had surgery with Dr. Bauer of thoracic lipoma resection which really significantly helped her pain. Prior to that, she wasn't able to lean back in a chair. She is also feeling better after the injections below. Her balance is worsening slightly, she is using a cane outside of her home. She has chronic weakness of right leg and walks with antalgic gait. She has seen neurology for her gait difficulties and they have not uncovered a specific reason for it. The weakness has been a gradual worsening, did not seem to start after her last surgery.  She has chronic constipation but no urinary changes. No radiculopathy symptoms.     Conservative measures:   Injections: right Piriformis Muscle Botox Injection Under Ultrasound Guidance, right Superior Cluneal Nerve Block Under Ultrasound Guidance 2/3/25 by Dr. Medina (PMR). R T10-T11 facet steroid injection 10/31/24 helped 75% for 1 month. Is planning to have this repeated  Completed PT, aquatherapy three times a week. Not doing HEP as much.   Gabapentin 800 TID, meloxicam, APAP    Oswestry (IAM) Questionnaire        1/30/2025     6:22 PM   OSWESTRY DISABILITY INDEX   Count 9    Sum 19    Oswestry Score (%) 42.22 %        Patient-reported            Neck Disability Index (NDI) Questionnaire        1/30/2025     6:30 PM   Neck Disability Index (NDI)   Neck  Disability Index: Count 10   NDI: Total Score = SUM (points for all 10 findings) 7   Neck Disability in Percent = (Total Score) / 50 * 100 14 (%)          Visual Analog Scale (VAS) Questionnaire        2/4/2025     7:54 AM   VISUAL ANALOG PAIN SCALE   Back Pain Scale 0-10 3   Right leg pain 0   Left leg pain 0   Neck Pain Scale 0-10 0   Right arm pain 0   Left arm pain 0            PROMIS-10 Scores  Global Mental Health Score: (Patient-Rptd) (P) 16  Global Physical Health Score: (Patient-Rptd) (P) 15  PROMIS TOTAL - SUBSCORES: (Patient-Rptd) (P) 31    Objective:  There were no vitals taken for this visit.   Alert, oriented x 3, cooperative, no acute distress.   Ambulates independently. Trendelenburg gait affecting the right side.   Strength: 4/5 R hip abductors, 5/5 L hip abductors, 5/5 iliopsoas, 5/5 quadriceps, 5/5 hamstrings, 5/5 anterior tibialis, 5/5 extensor hallucis brevis, 5/5 gastrocnemius. Unable to single leg stance on the right. Able to perform single leg stance on the left for a few seconds.   Neuro: DTRs +2 R patella, +1 L patella, absent ankles, no ankle clonus.  Spine no tenderness to palpation at the T10-T11. Well healed mid thoracic R scar and midline thoracolumbar scar. Lhermitte negative.   Hip exam negative, no pain with ROM, FADIR/ROSHAN neg    I watched her walk in the hallway outside the room.  Seems to me she has a right Trendelenburg gait such that during right single-leg stance phase, her left hemipelvis drops, and her trunk sways towards the right.  Does not have wide-based gait that would be more consistent with myelopathy.  Also with positive right Trendelenburg sign, such that when she stands on the right foot, her left hemipelvis drops.  Manual motor testing of hip abduction reveals asymmetric weakness: Right 4/5, left 5/5.    Imaging:   All imaging independently reviewed and interpreted in the visit today.   EOS AP/lateral scoliosis XR 2/4/2025   Postoperative changes of L1 to pelvis  fusion with bilateral SI joint fusions.  Stable severe disc height loss T10-T11 and T11-T12 with evidence of ankylosis at the anterior disc space.  Stable 30 degrees kyphosis from T10-L1 compared to the October x-rays.  Stands with hips and knees in flexion.  Head centered above the pelvis.  Multilevel disc degeneration throughout the cervical and thoracic spine.    Assessment:   79 year old female with:  1.  Balance difficulties probably 2' to R gluteus medius weakness, with Trendelenburg gait, (+) Right Trendelenburg sign, and asymmetric R hip abduction weakness (4/5 on Right) (2/4/25).   2.  Relatively mild cervical canal stenosis, cervical mylelopathy with UE hyperreflexia, (+) R Bianchi's. No axial neck pain or radicular symptoms. Imbalance does not seem to be related to myelopathy.   3.  PJK s/p L1-pelvis fusion; L4 PSO; bilateral Bedrock (1/7/20 Abril/João).  4.  Multilevel thoracic spondylosis which is most advanced T9-T12,  Biopsy 5/21/24 negative for infectious process.    Plan:   Plan to monitor symptoms for now. She will continue working with Dr Medina on injections. If any worsening weakness then asked her to call us and will obtain LE EMG and Thoracic and lumbar CT. We are unsure of the underlying cause of gluteus medius weakness. At this time, she doesn't want any surgery so will not pursue more invasive testing.    - follow up in 6 months with Dr. Jin with AP/lateral scoliosis XR.     The patient was also seen and examined by Dr. Jin, who formulated the above plan. We used their imaging and models to explain the pathophysiology and treatment options. All of their questions were answered to their full satisfaction.     Respectfully,     Deandra Cox PA-C   Orthopedic Spine Surgery    Attestation:  I (Dr. Rodolfo Jin - Spine Surgeon) have personally evaluated patient with NAY Cox, and agree with findings and plan outlined in the note, which I also edited.  I discussed at length  with the patient/family, explained the nature of spinal condition, and formulated workup and/or treatment plan together.  All questions were answered to the best of my ability and to patient's apparent satisfaction.    Patient's main complaint is gait imbalance.  I watched her walk in the hallway outside the room.  Seems to me she has a right Trendelenburg gait such that during right single-leg stance phase, her left hemipelvis drops, and her trunk sways towards the right.  Does not have wide-based gait that would be more consistent with myelopathy.  Also with positive right Trendelenburg sign, such that when she stands on the right foot, her left hemipelvis drops.  Manual motor testing of hip abduction reveals asymmetric weakness: Right 4/5, left 5/5.  Thus, I suspect her gait and balance issues are more likely secondary to right hip abductor (gluteus medius) weakness.  If this was neurologic in origin, may suspect L5 radiculopathy, which in turn may be chronic as her last spinal surgery consisting of L1 to pelvis fusion was performed 5 years ago.  Of note, has not had previous hip replacement.  EOS x-rays only show mild degenerative changes in the right hip joint.    For now, patient and I agreed to simply observe.  As long as things do not progress, I believe her symptoms are manageable/tolerable.  If they do get worse, we would consider obtaining EMG and updated thoracic and lumbar CT scan.    25 minutes spent on the date of the encounter doing chart review/review of outside records/review of test results/interpretation of tests/patient visit/documentation/discussion with other provider(s)/discussion with patient and family.

## 2025-02-05 ENCOUNTER — TELEPHONE (OUTPATIENT)
Dept: PHYSICAL MEDICINE AND REHAB | Facility: CLINIC | Age: 80
End: 2025-02-05
Payer: MEDICARE

## 2025-02-05 NOTE — TELEPHONE ENCOUNTER
Phoned the patient and left VM. Stated to call the pain clinic to schedule injection procedure at [phone].

## 2025-02-11 ENCOUNTER — TELEPHONE (OUTPATIENT)
Dept: PHYSICAL MEDICINE AND REHAB | Facility: CLINIC | Age: 80
End: 2025-02-11
Payer: MEDICARE

## 2025-02-11 PROBLEM — M47.894 OTHER OSTEOARTHRITIS OF SPINE, THORACIC REGION: Status: ACTIVE | Noted: 2025-02-03

## 2025-02-11 PROBLEM — Z98.1 S/P SPINAL FUSION: Status: ACTIVE | Noted: 2025-02-03

## 2025-02-11 PROBLEM — M47.814 ARTHROPATHY OF THORACIC FACET JOINT: Status: ACTIVE | Noted: 2025-02-03

## 2025-02-11 NOTE — TELEPHONE ENCOUNTER
Called patient to schedule procedure with Dr. Medina    Date of Procedure: 3/3/25    Arrival time given: Yes: Arrival Time 9am       Procedure Location: [facility]     Verified Location with Patient:  Yes  Address provided to the patient     Pre-op H&P Required:  No: Local anesthesia        Post-Op/Follow Up Appt:  Not Indicated in Request      Informed patient they will need a  to drive them home:  Yes    Patients : Spouse     Patient is aware that pre-op RN from the procedure center will call 2-3 days prior to scheduled procedure to confirm arrival time and review any instructions:  Yes       Additional Comments: N/A        Fany Pemberton MA on 2/11/2025 at 9:35 AM      P: [phone]*

## 2025-03-03 ENCOUNTER — HOSPITAL ENCOUNTER (OUTPATIENT)
Facility: AMBULATORY SURGERY CENTER | Age: 80
Discharge: HOME | End: 2025-03-03
Attending: PHYSICAL MEDICINE & REHABILITATION | Admitting: PHYSICAL MEDICINE & REHABILITATION
Payer: MEDICARE

## 2025-03-03 ENCOUNTER — ANCILLARY PROCEDURE (OUTPATIENT)
Dept: RADIOLOGY | Facility: AMBULATORY SURGERY CENTER | Age: 80
End: 2025-03-03
Attending: PHYSICAL MEDICINE & REHABILITATION
Payer: MEDICARE

## 2025-03-03 VITALS
HEART RATE: 74 BPM | DIASTOLIC BLOOD PRESSURE: 82 MMHG | RESPIRATION RATE: 16 BRPM | TEMPERATURE: 97 F | SYSTOLIC BLOOD PRESSURE: 137 MMHG | BODY MASS INDEX: 35.87 KG/M2 | OXYGEN SATURATION: 97 % | WEIGHT: 190 LBS | HEIGHT: 61 IN

## 2025-03-03 DIAGNOSIS — R52 PAIN: ICD-10-CM

## 2025-03-03 PROBLEM — H35.363 DRUSEN OF MACULA OF BOTH EYES: Status: ACTIVE | Noted: 2022-08-04

## 2025-03-03 PROBLEM — D50.9 IRON DEFICIENCY ANEMIA: Status: ACTIVE | Noted: 2022-08-04

## 2025-03-03 PROBLEM — I10 BENIGN ESSENTIAL HYPERTENSION: Status: ACTIVE | Noted: 2022-08-04

## 2025-03-03 PROBLEM — H02.831 DERMATOCHALASIS OF BOTH UPPER EYELIDS: Status: ACTIVE | Noted: 2022-08-04

## 2025-03-03 PROBLEM — Z96.1 BILATERAL PSEUDOPHAKIA: Status: ACTIVE | Noted: 2022-08-04

## 2025-03-03 PROBLEM — M15.9 GENERALIZED OA: Status: ACTIVE | Noted: 2022-08-04

## 2025-03-03 PROBLEM — H04.123 DRY EYE SYNDROME OF BOTH EYES: Status: ACTIVE | Noted: 2023-01-26

## 2025-03-03 PROBLEM — I83.90 VARICOSE VEIN OF LEG: Status: ACTIVE | Noted: 2022-08-04

## 2025-03-03 PROBLEM — M21.611 BUNION, RIGHT FOOT: Status: ACTIVE | Noted: 2022-08-04

## 2025-03-03 PROBLEM — J32.9 RHINOSINUSITIS: Status: ACTIVE | Noted: 2022-08-04

## 2025-03-03 PROBLEM — M17.0 PRIMARY OSTEOARTHRITIS OF BOTH KNEES: Status: ACTIVE | Noted: 2022-08-04

## 2025-03-03 PROBLEM — M20.41 HAMMER TOE OF RIGHT FOOT: Status: ACTIVE | Noted: 2022-08-04

## 2025-03-03 PROBLEM — H91.90 HEARING LOSS: Status: ACTIVE | Noted: 2022-08-04

## 2025-03-03 PROBLEM — R92.8 ABNORMAL MAMMOGRAM: Status: ACTIVE | Noted: 2022-08-04

## 2025-03-03 PROBLEM — H02.834 DERMATOCHALASIS OF BOTH UPPER EYELIDS: Status: ACTIVE | Noted: 2022-08-04

## 2025-03-03 PROBLEM — D64.9 NORMOCYTIC ANEMIA: Status: ACTIVE | Noted: 2022-08-04

## 2025-03-03 PROBLEM — N83.209 OVARIAN CYST: Status: ACTIVE | Noted: 2022-08-04

## 2025-03-03 PROBLEM — H43.813 POSTERIOR VITREOUS DETACHMENT OF BOTH EYES: Status: ACTIVE | Noted: 2022-08-04

## 2025-03-03 PROBLEM — K21.9 GERD WITHOUT ESOPHAGITIS: Status: ACTIVE | Noted: 2022-08-04

## 2025-03-03 PROBLEM — E78.5 DYSLIPIDEMIA: Status: ACTIVE | Noted: 2022-08-04

## 2025-03-03 PROCEDURE — 64490 INJ PARAVERT F JNT C/T 1 LEV: CPT | Mod: RT | Performed by: PHYSICAL MEDICINE & REHABILITATION

## 2025-03-03 PROCEDURE — 64490 INJ PARAVERT F JNT C/T 1 LEV: CPT | Mod: RT

## 2025-03-03 RX ORDER — ONDANSETRON 4 MG/1
TABLET, ORALLY DISINTEGRATING ORAL
COMMUNITY
Start: 2024-03-29

## 2025-03-03 RX ORDER — ALBUTEROL SULFATE 90 UG/1
1 INHALANT RESPIRATORY (INHALATION)
COMMUNITY
Start: 2023-05-25

## 2025-03-03 RX ORDER — TRIAMCINOLONE ACETONIDE 40 MG/ML
INJECTION, SUSPENSION INTRA-ARTICULAR; INTRAMUSCULAR DAILY PRN
Status: DISCONTINUED | OUTPATIENT
Start: 2025-03-03 | End: 2025-03-03 | Stop reason: HOSPADM

## 2025-03-03 RX ORDER — IOPAMIDOL 408 MG/ML
INJECTION, SOLUTION INTRAVASCULAR DAILY PRN
Status: DISCONTINUED | OUTPATIENT
Start: 2025-03-03 | End: 2025-03-03 | Stop reason: HOSPADM

## 2025-03-03 RX ORDER — LIDOCAINE HYDROCHLORIDE 10 MG/ML
INJECTION, SOLUTION EPIDURAL; INFILTRATION; INTRACAUDAL; PERINEURAL DAILY PRN
Status: DISCONTINUED | OUTPATIENT
Start: 2025-03-03 | End: 2025-03-03 | Stop reason: HOSPADM

## 2025-03-03 RX ORDER — DIAZEPAM 5 MG/1
5 TABLET ORAL EVERY 6 HOURS PRN
Status: DISCONTINUED | OUTPATIENT
Start: 2025-03-03 | End: 2025-03-04 | Stop reason: HOSPADM

## 2025-03-03 RX ADMIN — DIAZEPAM 5 MG: 5 TABLET ORAL at 09:29

## 2025-03-03 NOTE — OR NURSING
Dr. Medina discussed 5mg Valium administration with patient, educated on side effects and risks of medication as well as therapeutic effect. Patient and daughter verbalize understanding.     CLAI Ritter

## 2025-03-03 NOTE — OP NOTE
PROCEDURE NOTE: Thoracic Intra-articular Facet Injection     PROCEDURE DATE: 3/3/2025      NAME: Carmelita Mina  YOB: 1945     ATTENDING PHYSICIAN: Veronica Medina MD  RESIDENT/FELLOW PHYSICIAN: None     PREOPERATIVE DIAGNOSIS:   Arthropathy of thoracic facet joint  Thoracic radiculopathy   S/p lumbar spinal fusion  POSTOPERATIVE DIAGNOSIS: Same     PROCEDURE PERFORMED: Thoracic Intra-Articular Facet Steroid Injection at the Right T10-11 facet     FLUOROSCOPY WAS USED.     INDICATIONS FOR PROCEDURE:   This is a 79 year old female with a clinical picture consistent with thoracic facet arthropathy and facet-mediated pain.  Patient has been seen and evaluated by her orthopedic spine surgeon, Dr. Samuels, and referred for right thoracic intra-articular facet steroid injection. Her initial facet joint steroid injection at this location was 10/31/2024. This patient has been assessed and evaluated as a candidate for repeat injection at the same location previously treated and found to have had at least 50% improvement in pain and 50% improvement in functionality consistently for at least 3 months.      PROCEDURE AND FINDINGS:    Carmelita Mina was greeted in the pre-procedure holding area. The risk, benefits and alternatives to the procedure were again reviewed with the patient and written informed consent was placed in the chart. An IV line was not placed.  A 500 mL bag of NS was not connected to the patient. She was taken to the procedure room and positioned prone on the fluoroscopy table.  Routine monitors were applied including blood pressure cuff, and pulse oximetry. Prior to the procedure a time out was completed, verifying correct patient, procedure, site, positioning, and implants and/or special equipment.      An AP fluoroscpic film was taken to identify the vertebral bodies at the corresponding levels. The skin overlying the aforementioned facets was prepped with chlorhexidine and draped in  the usual sterile fashion. The skin and subcutaneous tissue overlying the T10-11 facet(s) was anesthetized using a 27-guage 1-1/4 inch needle with 1% preservative-free lidocaine for a total volume of 3mls.     After achieving sufficient anesthesia, 22-gauge 3.5-inch needles were advanced, under AP view with caudal tilt and contralateral oblique fluoroscopic guidance, to the facet joint(s). Additional views obtained and confirmed intra-articular placement of the needle tips. Then, after negative aspiration, 0.3mls of Omnipaque contrast was injected under AP view with live fluoroscopy and confirmed adequate spread along the facet joint.  There was no evidence of intravascular uptake or spread on imaging.     At this point, after negative aspiration, a total  0.75 mL volume of treatment injectate, consisting of 0.5mL Kenalog (40mg/mL-20 mg Kenalog was wasted), and  0.25 mL of 1% Lidocaine, was injected easily.  The needle was then flushed with 0.3 ml of local anesthetic and removed. The needle insertion site was dressed appropriatelyEmelyn Mae was taken to the recovery room where she was monitored for a brief period of time. She tolerated the procedure well and was discharged home in stable condition with post procedural instructions.     Before the procedure, she reported a pain score of 4/10.   After the procedure, she reported a pain score of 1/10.       Follow-up will be in clinic      COMPLICATIONS: None     COMMENTS: None

## 2025-03-03 NOTE — DISCHARGE INSTRUCTIONS

## 2025-04-07 NOTE — PROGRESS NOTES
"[facility]  Orthopedics  Segundo Samuels MD  2020     Name: Carmelita Mina  MRN: [id number]  Age: 74 year old  : 1945  Referring provider: Chino Langford     Chief Complaint: RECHECK (DOS 20 Revision Lumbar Fusion for Pseudo & Broken Instrumentation & Flatback )     Date of Surgery: 2020     Procedure:   1.  Posterior spinal fusion L-2.    2.  Pedicle subtraction osteotomy L4.     3.  Lorenzo-Flowers osteotomy L1-2.     4.  Transforaminal lumbar interbody fusion L1-L2.     5.  Insertion structural intervertebral device L1-2.      6.  Spinal instrumentation L1.     7.  Reinsertion spinal instrumentation L2-S1.     8.  Pelvic fixation Image-guided surgery.   9. Bilateral sacroiliac joint fusion    History of Present Illness:   Carmelita Mina is a 74 year old female 6 weeks status-post the above procedure who presents for postoperative evaluation. Today, the patient reports immense satisfaction with the surgery than she has in the three last years. She has also gained an inch in height. No other concerns.    Physical Examination:  Ht 1.6 m (5' 2.99\")   Wt 99.3 kg (219 lb)   BMI 38.80 kg/m      Constitutional - Patient is healthy, well-nourished and appears stated age  Respiratory - Patient is breathing normally and in no respiratory distress.  Skin - No suspicious rashes or lesions. Surgical incisions are well-healed.   Psychiatric - Normal mood and affect.  Cardiovascular - Peripheral pulses are normal.  Eyes - Visual acuity is normal to the written word.  ENT - Hearing intact to the spoken word.  Musculoskeletal - Non-antalgic gait without use of assistive devices.  Tenderness over former muscle bilaterally    Non tender greater trochanteric bursas   Milldy positive Trendelenberg bilateral     Radiographs:   Radiographs of the Spine -2 views (2020):    Excellent alignment instrumentation in place.    I have independently reviewed the above imaging studies; the results were " discussed with the patient.     Assessment:   74 year old female status post pedicle subtraction osteotomy and extension of fusion to L2. Patient is progressing appropriately.     Plan:     Pool therapy then transition to land    Follow up in 6 weeks     Back in Balance therapy if determined to be needed by PT     Unable to work for 6 more weeks      Scribe Disclosure:  IVan, am serving as a scribe to document services personally performed by Segundo Samuels MD at this visit, based upon the provider's statements to me. All documentation has been reviewed by the aforementioned provider prior to being entered into the official medical record.     Segundo Samuels MD             Answers for HPI/ROS submitted by the patient on 2/17/2020   General Symptoms: No  Skin Symptoms: No  HENT Symptoms: No  EYE SYMPTOMS: No  HEART SYMPTOMS: No  LUNG SYMPTOMS: Yes  INTESTINAL SYMPTOMS: No  URINARY SYMPTOMS: No  GYNECOLOGIC SYMPTOMS: No  BREAST SYMPTOMS: No  SKELETAL SYMPTOMS: No  BLOOD SYMPTOMS: No  NERVOUS SYSTEM SYMPTOMS: No  MENTAL HEALTH SYMPTOMS: No  Cough: Yes  Sputum or phlegm: Yes  Coughing up blood: No  Difficulty breating or shortness of breath: No  Snoring: No  Wheezing: Yes  Difficulty breathing on exertion: No  Nighttime Cough: Yes  Difficulty breathing when lying flat: No  If you checked off any problems, how difficult have these problems made it for you to do your work, take care of things at home, or get along with other people?: Not difficult at all  PHQ9 TOTAL SCORE: 2     04-07    145  |  109[H]  |  36[H]  ----------------------------<  99  3.2[L]   |  29  |  0.97    Ca    9.7      07 Apr 2025 05:25  Phos  1.6     04-07  Mg     1.9     04-07    TPro  6.4  /  Alb  2.2[L]  /  TBili  0.4  /  DBili  x   /  AST  32  /  ALT  35  /  AlkPhos  111  04-07  POCT Blood Glucose.: 178 mg/dL (04-06-25 @ 12:04)

## 2025-04-08 DIAGNOSIS — M62.838 MUSCLE SPASM: Primary | ICD-10-CM

## 2025-04-15 ENCOUNTER — TELEPHONE (OUTPATIENT)
Dept: PHYSICAL MEDICINE AND REHAB | Facility: CLINIC | Age: 80
End: 2025-04-15
Payer: MEDICARE

## 2025-04-15 PROBLEM — Z98.1: Status: ACTIVE | Noted: 2025-04-11

## 2025-04-15 PROBLEM — M51.34: Status: ACTIVE | Noted: 2025-04-11

## 2025-04-15 NOTE — TELEPHONE ENCOUNTER
Called patient to schedule procedure with Dr. Medina    Date of Procedure: 5/12/25 & 5/19/25    Arrival time given: Yes: Arrival Time 9:45am & 9:30am       Procedure Location: [facility] and Surgery and Procedure Center [facility]     Verified Location with Patient:  Yes  Address provided to the patient     Pre-op H&P Required:  No: Local anesthesia        Post-Op/Follow Up Appt:  Not Indicated in Request      Informed patient they will need a  to drive them home:  Yes    Patients : Spouse     Patient is aware that pre-op RN from the procedure center will call 2-3 days prior to scheduled procedure to confirm arrival time and review any instructions:  Yes       Additional Comments: N/A        Fany Pemberton MA on 4/15/2025 at 10:14 AM      P: [phone]

## 2025-04-17 NOTE — TELEPHONE ENCOUNTER
See previous My Chart messages & replies.    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 Also stated Cervical Cord Dorsal compression might explain Balance symptoms & ordered cervical consult with one of his partners.  Then needs RTN appt after all.  Ordered continue present PT for Balance & they agreed.    Dtr will call Rad [phone] to schedule.    Offered  Cervical appt.  In May but Dtr stated due to 3H drive they prefer to wait until Aug 20 when coming to see  in [facility]R.   Made RTN  appt. After all.    Call back prn. They agreed.  /Christine Russo RN.      done

## 2025-04-28 ENCOUNTER — OFFICE VISIT (OUTPATIENT)
Dept: PHYSICAL MEDICINE AND REHAB | Facility: CLINIC | Age: 80
End: 2025-04-28
Payer: MEDICARE

## 2025-04-28 DIAGNOSIS — M79.18 PIRIFORMIS MUSCLE PAIN: ICD-10-CM

## 2025-04-28 DIAGNOSIS — Z98.1 S/P SPINAL FUSION: ICD-10-CM

## 2025-04-28 DIAGNOSIS — G58.8 CLUNEAL NEUROPATHY: ICD-10-CM

## 2025-04-28 DIAGNOSIS — M62.838 SPASM OF MUSCLE: ICD-10-CM

## 2025-04-28 DIAGNOSIS — M62.838 SPASM OF RIGHT PIRIFORMIS MUSCLE: ICD-10-CM

## 2025-04-28 DIAGNOSIS — G57.01 PIRIFORMIS SYNDROME OF RIGHT SIDE: ICD-10-CM

## 2025-04-28 DIAGNOSIS — M62.838 MUSCLE SPASM: Primary | ICD-10-CM

## 2025-04-28 PROCEDURE — 76942 ECHO GUIDE FOR BIOPSY: CPT | Mod: GC | Performed by: PHYSICAL MEDICINE & REHABILITATION

## 2025-04-28 PROCEDURE — 64642 CHEMODENERV 1 EXTREMITY 1-4: CPT | Mod: GC | Performed by: PHYSICAL MEDICINE & REHABILITATION

## 2025-04-28 RX ORDER — BUPIVACAINE HYDROCHLORIDE 2.5 MG/ML
3 INJECTION, SOLUTION INFILTRATION; PERINEURAL ONCE
Status: COMPLETED | OUTPATIENT
Start: 2025-04-28 | End: 2025-04-28

## 2025-04-28 RX ORDER — TRIAMCINOLONE ACETONIDE 40 MG/ML
40 INJECTION, SUSPENSION INTRA-ARTICULAR; INTRAMUSCULAR ONCE
Status: COMPLETED | OUTPATIENT
Start: 2025-04-28 | End: 2025-04-28

## 2025-04-28 RX ADMIN — BUPIVACAINE HYDROCHLORIDE 7.5 MG: 2.5 INJECTION, SOLUTION INFILTRATION; PERINEURAL at 14:27

## 2025-04-28 RX ADMIN — TRIAMCINOLONE ACETONIDE 40 MG: 40 INJECTION, SUSPENSION INTRA-ARTICULAR; INTRAMUSCULAR at 14:27

## 2025-04-28 NOTE — LETTER
4/28/2025       RE: Carmelita Mina  [address] [facility] [address]     Dear Colleague,    Thank you for referring your patient, Carmelita Mina, to the [facility] [place] at [facility]. Please see a copy of my visit note below.    PROCEDURE NOTE:   Piriformis Muscle Botulinum Toxin-A (Botox) Injection - and -   Cluneal Nerve Block Under Ultrasound Guidance    PROCEDURE DATE: 4/28/2025             PATIENT NAME: Carmelita Mina  YOB: 1945    ATTENDING PHYSICIAN: Veronica Medina MD  FELLOW/RESIDENT PHYSICIAN: Brionna Fitzpatrick MD, PGY-IV     PREOPERATIVE DIAGNOSIS:   Muscle spasm  (primary encounter diagnosis)  Spasm of right piriformis muscle  Spasm of muscle  Piriformis syndrome of right side  Piriformis muscle pain  S/P spinal fusion  Cluneal neuropathy    OSTOPERATIVE DIAGNOSIS: same    PROCEDURE PERFORMED:   Right Piriformis Muscle Botox Injection Under Ultrasound Guidance  Right Superior Cluneal Nerve Block Under Ultrasound Guidance    ULTRASOUND WAS USED.    INDICATIONS FOR THE PROCEDURE:  Carmelita Mina is a 79 year old female who presents with piriformis syndrome and muscle spasm and cluneal neuropathy s/p lumbosacral spinal fusion      INTERVAL HISTORY:  Patient was last seen via video visit April 11, 2025 and for prior injection February 3, 2025.  She continues to have consistent improvement following piriformis Botox injection and cluneal nerve blocks reporting 70 to 75% improvement for 1 month, with diminishing response for the subsequent 2 months with approximately 50% improvement. This patient has been assessed and evaluated as a candidate for repeat injection at the same location previously treated and found to have had at least 50% improvement in pain and 50% improvement in functionality consistently for at least 3 months.  She continues pool/aquatic therapy.  Finds  that the injections help with sitting tolerance, for example, she is able to attend [employer] with prolonged periods of sitting.  Also helped significantly with pain related sleep disturbance.     PROCEDURE AND FINDINGS:  She was greeted in the clinic. The risk, benefits and alternatives to the procedure were again reviewed with her and informed consent was obtained and the patient agreed to proceed. A time-out was performed. Following review alternatives, benefits and risks, the procedure was carried out under sterile prep with sterile gel. The use of direct sonographic guidance was used to ensure accurate placement of the needle (rather than non-guided injection) and required to minimize the risk of bleeding or injury to nearby neurovascular structures. Images were recorded and stored.     1. First, a 5-1MHz ultrasound transducer was used to visualize the relevant structures and determine the optimal needle path for the procedure.  1mL 1% lidocaine was infiltrated at the needle insertion site subcutaneously. Then, a 22 gauge 5-inch Quincke spinal needle was advanced from lateral to medial utilizing an in-plane approach in long axis, under continuous ultrasound guidance to the piriformis muscle on the right. After negative aspiration, slow injection of the treatment solution 2mL total consisting of 100 units Botox reconstituted in 2mL of preservative free normal saline was instilled into affected area.      100 units of botulinum toxin was diluted 50 units/mL of preservative free saline. The following muscles were injected:    100 units per right piriformis muscle   ____________________________________    100 total units used.   The remainder (0 units) of the single-use vials were discarded.      2. Next, a 5-1MHz ultrasound transducer was used to visualize the relevant structures and determine the optimal needle path for the procedure.  1mL 1% lidocaine was infiltrated at the needle insertion site subcutaneously.  Then, a 22 gauge 5-inch needle was advanced from lateral to medial utilizing an in-plane approach, under continuous ultrasound guidance to the right superior cluneal nerve region at the level of the iliac crest. After negative aspiration, slow injection of the treatment solution 4mL total consisting of 1mL Kenalog (40mg/mL) and 3mL of 0.25% Bupivacaine was instilled into affected area.     The tip of the needle was visualized throughout the procedure. She tolerated the procedure well, was discharged home in stable condition.     Follow-up will be in clinic 12-weeks for repeat injection.     COMPLICATIONS: None    COMMENTS:  None      Procedure was performed by myself and Dr. Staton, resident physician, under my direct supervision.         Again, thank you for allowing me to participate in the care of your patient.      Sincerely,    Veronica Medina MD

## 2025-04-28 NOTE — PATIENT INSTRUCTIONS
It was a pleasure seeing you today in our [facility]. We discussed the following:    #. #. BOTOX INJECTION AFTERCARE     Procedure performed: Cluneal nerve block on the right    Please contact our clinic if you have any bleeding, dizziness, or any concerns related to this procedure.    You may have an increase in pain for several days following the injections, this can be expected from the procedure.     Botox may take up to 3 weeks to reduce your pain and symptoms.     Most common side effect is pain, this will improve over a few days.    Other side effects may include: fatigue, muscle pain, weakness, swelling, and bruising, these should resolve.    Please note that if you have an active INFECTION, FEVER, or are taking ANTIBIOTICS, we would need to reschedule your Botox injections until this has cleared. Please call the office to RESCHEDULE if this occurs.      #. INJECTION AFTERCARE     Procedure performed: Cluneal nerve block on the right    Medications used today: 1mL Kenalog (40mg/mL), 0.25% bupivacaine, 1% lidocaine    After any kind of injection, it is not uncommon to experience:  - Soreness, swelling or bruising around the injection site.  - Mild numbness, tingling or weakness around the injection site caused by the numbing medicine used before or with the injection.      It is also possible to experience the following effects associated with the specific agent after injection:  - Allergic reaction.  - Increased blood sugar levels for 10-14 days following cortisone injection. If you have diabetes and notice that your blood sugar levels have increased, notify your primary care physician.  - Increased blood pressure levels.  - Mood swings.  - Increased fluid accumulation in the injected joint.     These effects all should resolve within a day or two of your procedure.     Please note that it may take up to 14 days for the steroid (cortisone) medication to start working.      HOME CARE  INSTRUCTIONS     - Limit yourself to light activity activity on the day of your procedure. Avoid lifting heavy objects, bending, stooping or twisting.   - You may shower, but please avoid swimming, hot tubs or tub baths for 24 hours following the procedure.   - Take over-the-counter pain medication (NSAIDS or Tylenol) for pain control after your procedure as needed.    - You may use ice packs for 10-15 minutes 3-4 times per day at the injection site to reduce pain and swelling after your procedure.   + Put ice in a plastic bag  + Place a towel between your skin and the ice bag  + Leave the ice on for no longer than 20 minutes each time to avoid injuring your skin or nerves  + This can be repeated 3-4 times per day for a few days after the injection     SEEK IMMEDIATE MEDICAL CARE IF:     - Pain and swelling get worse rather than better or extend beyond the injection site  - Numbness does not go away after a few hours  - Blood or fluid continues to leak from the injection site  - You experience chest pain  - You have swelling of your face or tongue  - You have trouble breathing or become dizzy  - You develop fever, chills or severe tenderness at the injection site that lasts longer than 1 day     MAKE SURE YOU:     - Understand these instructions  - Watch your condition  - Get help right away if you are not doing well or if you get worse          Follow-up: Please schedule a return visit for repeat injections in 12 weeks.

## 2025-04-28 NOTE — PROGRESS NOTES
PROCEDURE NOTE:   Piriformis Muscle Botulinum Toxin-A (Botox) Injection - and -   Cluneal Nerve Block Under Ultrasound Guidance    PROCEDURE DATE: 4/28/2025             PATIENT NAME: Carmelita Mina  YOB: 1945    ATTENDING PHYSICIAN: Veronica Medina MD  FELLOW/RESIDENT PHYSICIAN: Brionna Fitzpatrick MD, PGY-IV     PREOPERATIVE DIAGNOSIS:   Muscle spasm  (primary encounter diagnosis)  Spasm of right piriformis muscle  Spasm of muscle  Piriformis syndrome of right side  Piriformis muscle pain  S/P spinal fusion  Cluneal neuropathy    OSTOPERATIVE DIAGNOSIS: same    PROCEDURE PERFORMED:   Right Piriformis Muscle Botox Injection Under Ultrasound Guidance  Right Superior Cluneal Nerve Block Under Ultrasound Guidance    ULTRASOUND WAS USED.    INDICATIONS FOR THE PROCEDURE:  Carmelita Mina is a 79 year old female who presents with piriformis syndrome and muscle spasm and cluneal neuropathy s/p lumbosacral spinal fusion      INTERVAL HISTORY:  Patient was last seen via video visit April 11, 2025 and for prior injection February 3, 2025.  She continues to have consistent improvement following piriformis Botox injection and cluneal nerve blocks reporting 70 to 75% improvement for 1 month, with diminishing response for the subsequent 2 months with approximately 50% improvement. This patient has been assessed and evaluated as a candidate for repeat injection at the same location previously treated and found to have had at least 50% improvement in pain and 50% improvement in functionality consistently for at least 3 months.  She continues pool/aquatic therapy.  Finds that the injections help with sitting tolerance, for example, she is able to attend [employer] with prolonged periods of sitting.  Also helped significantly with pain related sleep disturbance.     PROCEDURE AND FINDINGS:  She was greeted in the clinic. The risk, benefits and alternatives to the procedure were again reviewed with her and informed  consent was obtained and the patient agreed to proceed. A time-out was performed. Following review alternatives, benefits and risks, the procedure was carried out under sterile prep with sterile gel. The use of direct sonographic guidance was used to ensure accurate placement of the needle (rather than non-guided injection) and required to minimize the risk of bleeding or injury to nearby neurovascular structures. Images were recorded and stored.     1. First, a 5-1MHz ultrasound transducer was used to visualize the relevant structures and determine the optimal needle path for the procedure.  1mL 1% lidocaine was infiltrated at the needle insertion site subcutaneously. Then, a 22 gauge 5-inch Quincke spinal needle was advanced from lateral to medial utilizing an in-plane approach in long axis, under continuous ultrasound guidance to the piriformis muscle on the right. After negative aspiration, slow injection of the treatment solution 2mL total consisting of 100 units Botox reconstituted in 2mL of preservative free normal saline was instilled into affected area.      100 units of botulinum toxin was diluted 50 units/mL of preservative free saline. The following muscles were injected:    100 units per right piriformis muscle   ____________________________________    100 total units used.   The remainder (0 units) of the single-use vials were discarded.      2. Next, a 5-1MHz ultrasound transducer was used to visualize the relevant structures and determine the optimal needle path for the procedure.  1mL 1% lidocaine was infiltrated at the needle insertion site subcutaneously. Then, a 22 gauge 5-inch needle was advanced from lateral to medial utilizing an in-plane approach, under continuous ultrasound guidance to the right superior cluneal nerve region at the level of the iliac crest. After negative aspiration, slow injection of the treatment solution 4mL total consisting of 1mL Kenalog (40mg/mL) and 3mL of 0.25%  Bupivacaine was instilled into affected area.     The tip of the needle was visualized throughout the procedure. She tolerated the procedure well, was discharged home in stable condition.     Follow-up will be in clinic 12-weeks for repeat injection.     COMPLICATIONS: None    COMMENTS:  None      Procedure was performed by myself and Dr. Staton, resident physician, under my direct supervision.

## 2025-05-12 ENCOUNTER — ANCILLARY PROCEDURE (OUTPATIENT)
Dept: RADIOLOGY | Facility: AMBULATORY SURGERY CENTER | Age: 80
End: 2025-05-12
Attending: PHYSICAL MEDICINE & REHABILITATION
Payer: MEDICARE

## 2025-05-12 ENCOUNTER — HOSPITAL ENCOUNTER (OUTPATIENT)
Facility: AMBULATORY SURGERY CENTER | Age: 80
Discharge: HOME | End: 2025-05-12
Attending: PHYSICAL MEDICINE & REHABILITATION | Admitting: PHYSICAL MEDICINE & REHABILITATION
Payer: MEDICARE

## 2025-05-12 VITALS
HEIGHT: 61 IN | TEMPERATURE: 96.7 F | WEIGHT: 190 LBS | HEART RATE: 59 BPM | RESPIRATION RATE: 16 BRPM | BODY MASS INDEX: 35.87 KG/M2 | OXYGEN SATURATION: 97 % | DIASTOLIC BLOOD PRESSURE: 80 MMHG | SYSTOLIC BLOOD PRESSURE: 141 MMHG

## 2025-05-12 DIAGNOSIS — M47.894 OTHER OSTEOARTHRITIS OF SPINE, THORACIC REGION: ICD-10-CM

## 2025-05-12 PROCEDURE — 64490 INJ PARAVERT F JNT C/T 1 LEV: CPT | Mod: RT | Performed by: PHYSICAL MEDICINE & REHABILITATION

## 2025-05-12 PROCEDURE — 64491 INJ PARAVERT F JNT C/T 2 LEV: CPT | Mod: RT | Performed by: PHYSICAL MEDICINE & REHABILITATION

## 2025-05-12 PROCEDURE — 64490 INJ PARAVERT F JNT C/T 1 LEV: CPT | Mod: RT,KX

## 2025-05-12 RX ORDER — IOPAMIDOL 408 MG/ML
INJECTION, SOLUTION INTRAVASCULAR DAILY PRN
Status: DISCONTINUED | OUTPATIENT
Start: 2025-05-12 | End: 2025-05-12 | Stop reason: HOSPADM

## 2025-05-12 NOTE — DISCHARGE INSTRUCTIONS
Home Care Instructions after a Medial Branch Block      In a medial branch block, a local anesthetic (numbing medicine) is injected near the medial branch nerve. This stops the transmission of pain signals from the facet joint. If this reduces your pain and improves your mobility, it may tell the doctor which facet joint is causing the pain. This procedure is a diagnostic procedure and is typically short lasting, with intentions of doing a radiofrequency ablation. With this injection, a steroid to increase the longevity of the blocks effect is rarely used.    Activity  -You may resume most normal activity levels with the exception of strenuous activity. It is important for us to know if your pain with normal activity is relieved after this injection.  -DO NOT shower for 24 hours  -DO NOT remove bandaid for 24 hours    Pain  -You may experience soreness at the injection site for one or two days  -You may use an ice pack for 20 minutes every 2 hours for the first 24 hours  -You may use a heating pad after the first 24 hours  -You may use Tylenol (acetaminophen) every 4 hours or other pain medicines as     directed by your physician    You may experience numbness radiating into your legs or arms (depending on the procedure location). This numbness may last several hours. Until sensation returns to normal; please use caution in walking, climbing stairs, and stepping out of your vehicle, etc.    PLEASE KEEP TRACK OF YOUR SYMPTOMS AND NOTE YOUR IMPROVEMENT FOR YOUR DOCTOR.     Fill out the pain diary and fax it back to us. You do not need to call us if the pain diary was faxed. [phone] is the FAX number  If you do not have access to a fax machine, attach it to [employer].  You do not need to call us if the pain diary was attached to [employer].   If you cannot fax or send via [employer], then call our call center and request to speak with a nurse for follow up after a procedure       Please contact us if you have:  -Severe  pain  -Fever more than 101.5 degrees Fahrenheit  -Signs of infection at the injection site (redness, swelling, or drainage)    FOR PM&R PATIENTS:  For patients seen by the PM and R service, please call [phone]. (Monday through Friday 8a-5p).  After business hours and weekends call [phone] and ask for the PM and R doctor on call). If you need to fax a pain diary to PM&R the fax number is [phone]. If you are unable to fax, uploading to [employer] is encouraged, then send to provider. If you have procedure scheduling questions please call [phone] Option #2.

## 2025-05-12 NOTE — OP NOTE
PROCEDURE NOTE: 1st Diagnostic Thoracic Medial Branch Block    PROCEDURE DATE: 5/12/2025    PATIENT NAME: Carmelita Mina  YOB: 1945    ATTENDING PHYSICIAN: Veronica Medina MD   RESIDENT/FELLOW PHYSICIAN: Amari Staton MD, PGY-IV     PREOPERATIVE DIAGNOSIS:   Other, thoracic spondylosis  Thoracic facet pain  S/p L1-pelvis fusion   POSTOPERATIVE DIAGNOSIS: same    PROCEDURE PERFORMED: 1st diagnostic medial branch block at the Right T10, T11, T12 levels to treat the Right T10-11, T11-12 facet joints (T9, T10, T11 medial branch nerve blocks)     FLUOROSCOPY WAS USED.     INDICATIONS FOR PROCEDURE:   Carmelita Mina is a 79 year old female with a clinical picture consistent with right axial low back pain and lumbar facet arthropathy who presents for diagnostic medial branch block at the levels noted above to assess if there is a facetogenic component to her low back pain.  She has a history of multiple lumbosacral spinal surgeries resulting in L1-pelvis fusion.  She has had prior T10-11 facet joint corticosteroid injections with good relief in her symptoms.  We had discussed the pathway of diagnostic medial branch blocks and radiofrequency ablation.     PROCEDURE AND FINDINGS:    Carmelita was greeted in the pre-procedure holding area. The risk, benefits and alternatives to the procedure were again reviewed with the patient and written informed consent was placed in the chart. An IV line was not placed.  A 500 mL bag of NS was not connected to the patient. She was taken to the procedure room and positioned prone on the fluoroscopy table.  Routine monitors were applied including blood pressure cuff, and pulse oximetry. Prior to the procedure a time out was completed, verifying correct patient, procedure, site, positioning, and implants and/or special equipment.     The skin was prepped and draped in the usual sterile fashion. An AP fluoroscopic view was obtained to identify the vertebral bodies, the  transverse processes and the superior articulating processes at the T10, T11, T12 aforementioned levels on the right side(s). The skin overlying the junction of the TP-SAP at the aforementioned levels was anesthetized using a 27-gauge 1-1/4 inch needle with 1% preservative-free lidocaine for a total volume of 0.5 ml per level. Next, a 25-gauge 3 1/2 inch Quincke spinal needle was advanced under an AP fluoroscopic view to the junction of the superior articular process and transverse process(es). Correct needle position was then confirmed with additional AP, ipsilateral oblique and/or lateral views.      After negative aspiration, 0.3 mls of Isovue-M contrast was injected at each site under live fluoroscopy; no vascular run off was identified and the contrast spread was adequate. At this point, after negative aspiration, 0.5mL of 0.5% Bupivacaine, was injected at each site.  The needle was then re-styletted removed. The needle insertion site was dressed appropriately.     Carmelita was taken to the recovery room where she was monitored for a brief period of time. She tolerated the procedure well and was discharged home in stable condition with post procedural instructions.    Before the procedure, she reported a pain score of 4/10.   After the procedure, she reported a pain score of 0/10.      Follow-up will be Determined after block sheet reviewed.    COMPLICATIONS: None    COMMENTS: None

## 2025-05-13 ENCOUNTER — TELEPHONE (OUTPATIENT)
Dept: PHYSICAL MEDICINE AND REHAB | Facility: CLINIC | Age: 80
End: 2025-05-13
Payer: MEDICARE

## 2025-05-13 NOTE — TELEPHONE ENCOUNTER
Procedure follow-up:    Follow up after Right T10-11,T11-12 medial branch block #1    Date of service: 5/12/25  Information reviewed via Pain Diary reviewed with patient over the phone    Location of pain: right axial low back pain and lumbar facet arthropathy     Percentage & duration of pain relief after procedure:     Hours 1-3 post-injection 100% improvement in pain  Hours 4-5 post-injection 80% improvement in pain  Hour 6 post-injection 50% improvement in pain  Hour 7 post-injection 20% improvement in pain  Hour 8 post-injection 0% improvement in pain any longer    Patient is already scheduled for her second Medial Branch Block #2 injection on 5/19/25 with Dr. Medina.    Follow up: Routed to Dr. Medina to review.      LIZZ HawthorneN RN  RN Care Coordinator for Physical Medicine and Rehabilitation  [facility] [address] [address]  Main clinic office: [phone]  Main clinic fax: [phone]

## 2025-05-13 NOTE — TELEPHONE ENCOUNTER
Pt returning call to nursing to give information on her Pain Diary after her MBB          Chris Perez  Complex   [facility]  Pain Management

## 2025-05-19 ENCOUNTER — TELEPHONE (OUTPATIENT)
Dept: PHYSICAL MEDICINE AND REHAB | Facility: CLINIC | Age: 80
End: 2025-05-19

## 2025-05-19 ENCOUNTER — HOSPITAL ENCOUNTER (OUTPATIENT)
Facility: AMBULATORY SURGERY CENTER | Age: 80
Discharge: HOME | End: 2025-05-19
Attending: PHYSICAL MEDICINE & REHABILITATION | Admitting: PHYSICAL MEDICINE & REHABILITATION
Payer: MEDICARE

## 2025-05-19 ENCOUNTER — ANCILLARY PROCEDURE (OUTPATIENT)
Dept: RADIOLOGY | Facility: AMBULATORY SURGERY CENTER | Age: 80
End: 2025-05-19
Attending: PHYSICAL MEDICINE & REHABILITATION
Payer: MEDICARE

## 2025-05-19 VITALS
TEMPERATURE: 96.9 F | HEART RATE: 69 BPM | RESPIRATION RATE: 18 BRPM | DIASTOLIC BLOOD PRESSURE: 84 MMHG | SYSTOLIC BLOOD PRESSURE: 146 MMHG | OXYGEN SATURATION: 98 %

## 2025-05-19 DIAGNOSIS — M47.894 OTHER OSTEOARTHRITIS OF SPINE, THORACIC REGION: Primary | ICD-10-CM

## 2025-05-19 DIAGNOSIS — M47.894 OTHER OSTEOARTHRITIS OF SPINE, THORACIC REGION: ICD-10-CM

## 2025-05-19 DIAGNOSIS — Z98.1 S/P SPINAL FUSION: ICD-10-CM

## 2025-05-19 DIAGNOSIS — M47.814 ARTHROPATHY OF THORACIC FACET JOINT: ICD-10-CM

## 2025-05-19 PROCEDURE — 64490 INJ PARAVERT F JNT C/T 1 LEV: CPT | Mod: RT | Performed by: PHYSICAL MEDICINE & REHABILITATION

## 2025-05-19 PROCEDURE — 64490 INJ PARAVERT F JNT C/T 1 LEV: CPT | Mod: RT,KX

## 2025-05-19 PROCEDURE — 64491 INJ PARAVERT F JNT C/T 2 LEV: CPT | Mod: RT | Performed by: PHYSICAL MEDICINE & REHABILITATION

## 2025-05-19 RX ORDER — SODIUM CHLORIDE 9 MG/ML
500 INJECTION, SOLUTION INTRAVENOUS CONTINUOUS
OUTPATIENT
Start: 2025-05-19 | End: 2025-05-19

## 2025-05-19 RX ORDER — LIDOCAINE HYDROCHLORIDE 20 MG/ML
INJECTION, SOLUTION INFILTRATION; PERINEURAL PRN
Status: DISCONTINUED | OUTPATIENT
Start: 2025-05-19 | End: 2025-05-19 | Stop reason: HOSPADM

## 2025-05-19 RX ORDER — LIDOCAINE 40 MG/G
CREAM TOPICAL
OUTPATIENT
Start: 2025-05-19

## 2025-05-19 RX ORDER — IOPAMIDOL 408 MG/ML
INJECTION, SOLUTION INTRATHECAL PRN
Status: DISCONTINUED | OUTPATIENT
Start: 2025-05-19 | End: 2025-05-19 | Stop reason: HOSPADM

## 2025-05-19 NOTE — TELEPHONE ENCOUNTER
Called patient to schedule procedure with Dr. Medina    Date of Procedure: 6/23/25    Arrival time given: Yes: Arrival Time 9:15am      Procedure Location: [facility]     Verified Location with Patient:  Yes  Address provided to the patient     Pre-op H&P Required:  No: Moderate Sedation      Post-Op/Follow Up Appt:  Yes: 8/6/25 @8am      Informed patient they will need a  to drive them home:  Yes    Patients : Spouse     Patient is aware that pre-op RN from the procedure center will call 2-3 days prior to scheduled procedure to confirm arrival time and review any instructions:  Yes       Additional Comments: N/A        Fany Pemberton MA on 5/19/2025 at 4:14 PM      P: [phone]

## 2025-05-19 NOTE — DISCHARGE INSTRUCTIONS
Home Care Instructions after a Medial Branch Block      In a medial branch block, a local anesthetic (numbing medicine) is injected near the medial branch nerve. This stops the transmission of pain signals from the facet joint. If this reduces your pain and improves your mobility, it may tell the doctor which facet joint is causing the pain. This procedure is a diagnostic procedure and is typically short lasting, with intentions of doing a radiofrequency ablation. With this injection, a steroid to increase the longevity of the blocks effect is rarely used.    Activity  -You may resume most normal activity levels with the exception of strenuous activity. It is important for us to know if your pain with normal activity is relieved after this injection.  -DO NOT shower for 24 hours  -DO NOT remove bandaid for 24 hours    Pain  -You may experience soreness at the injection site for one or two days  -You may use an ice pack for 20 minutes every 2 hours for the first 24 hours  -You may use a heating pad after the first 24 hours  -You may use Tylenol (acetaminophen) every 4 hours or other pain medicines as     directed by your physician    You may experience numbness radiating into your legs or arms (depending on the procedure location). This numbness may last several hours. Until sensation returns to normal; please use caution in walking, climbing stairs, and stepping out of your vehicle, etc.    DID YOU RECEIVE SEDATION TODAY?  No    DID YOU RECEIVE STEROIDS TODAY?  No      PLEASE KEEP TRACK OF YOUR SYMPTOMS AND NOTE YOUR IMPROVEMENT FOR YOUR DOCTOR.     Fill out the pain diary and fax it back to us. You do not need to call us if the pain diary was faxed. [phone] is the FAX number  If you do not have access to a fax machine, attach it to [employer].  You do not need to call us if the pain diary was attached to [employer].   If you cannot fax or send via [employer], then call our call center and request to speak with a nurse  for follow up after a procedure       Please contact us if you have:  -Severe pain  -Fever more than 101.5 degrees Fahrenheit  -Signs of infection at the injection site (redness, swelling, or drainage)    FOR PM&R PATIENTS:  For patients seen by the PM and R service, please call [phone]. (Monday through Friday 8a-5p).  After business hours and weekends call [phone] and ask for the PM and R doctor on call). If you need to fax a pain diary to PM&R the fax number is [phone]. If you are unable to fax, uploading to [employer] is encouraged, then send to provider. If you have procedure scheduling questions please call [phone] Option #2.

## 2025-05-19 NOTE — OP NOTE
PROCEDURE NOTE: 2nd Diagnostic Thoracic Medial Branch Block    PROCEDURE DATE: 5/19/2025     PATIENT NAME: Carmelita Mina  YOB: 1945    ATTENDING PHYSICIAN: Veronica Medina MD   RESIDENT/FELLOW PHYSICIAN: Amari Staton MD, PGY-IV     PREOPERATIVE DIAGNOSIS:   Other, thoracic spondylosis  Thoracic facet pain  S/p L1-pelvis fusion   POSTOPERATIVE DIAGNOSIS: same    PROCEDURE PERFORMED: 2nd diagnostic medial branch block at the Right T10, T11, T12 levels to treat the Right T10-11, T11-12 facet joints (T9, T10, T11 medial branch nerve blocks)     FLUOROSCOPY WAS USED.     INDICATIONS FOR PROCEDURE:   Carmelita Mina is a 79 year old female with a clinical picture consistent with right axial low back pain and lumbar facet arthropathy who presents for diagnostic medial branch block at the levels noted above to assess if there is a facetogenic component to her low back pain.  She has a history of multiple lumbosacral spinal surgeries resulting in L1-pelvis fusion.  She has had prior T10-11 facet joint corticosteroid injections with good relief in her symptoms.  We had discussed the pathway of diagnostic medial branch blocks and radiofrequency ablation.     PROCEDURE AND FINDINGS:    Carmelita was greeted in the pre-procedure holding area. The risk, benefits and alternatives to the procedure were again reviewed with the patient and written informed consent was placed in the chart. An IV line was not placed.  A 500 mL bag of NS was not connected to the patient. She was taken to the procedure room and positioned prone on the fluoroscopy table.  Routine monitors were applied including blood pressure cuff, and pulse oximetry. Prior to the procedure a time out was completed, verifying correct patient, procedure, site, positioning, and implants and/or special equipment.     The skin was prepped and draped in the usual sterile fashion. An AP fluoroscopic view was obtained to identify the vertebral bodies, the  transverse processes and the superior articulating processes at the T10, T11, T12 aforementioned levels on the right side(s). The skin overlying the junction of the TP-SAP at the aforementioned levels was anesthetized using a 27-gauge 1-1/4 inch needle with 1% preservative-free lidocaine for a total volume of 0.5 ml per level. Next, a 25-gauge 3 1/2 inch Quincke spinal needle was advanced under an AP fluoroscopic view to the junction of the superior articular process and transverse process(es). Correct needle position was then confirmed with additional AP, ipsilateral oblique and/or lateral views.      After negative aspiration, 0.3 mls of Isovue-M contrast was injected at each site under live fluoroscopy; no vascular run off was identified and the contrast spread was adequate. At this point, after negative aspiration, 0.5mL of 2% Lidocaine, was injected at each site.  The needle was then re-styletted removed. The needle insertion site was dressed appropriately.     Carmelita was taken to the recovery room where she was monitored for a brief period of time. She tolerated the procedure well and was discharged home in stable condition with post procedural instructions.    Before the procedure, she reported a pain score of 4/10.   After the procedure, she reported a pain score of 0/10.      Follow-up will be Determined after block sheet reviewed.    COMPLICATIONS: None    COMMENTS: None

## 2025-05-28 ENCOUNTER — MYC MEDICAL ADVICE (OUTPATIENT)
Dept: ORTHOPEDICS | Facility: CLINIC | Age: 80
End: 2025-05-28
Payer: MEDICARE

## 2025-05-28 DIAGNOSIS — M51.34 DEGENERATION OF THORACIC INTERVERTEBRAL DISC: Primary | ICD-10-CM

## 2025-05-28 DIAGNOSIS — M54.14 THORACIC RADICULOPATHY: ICD-10-CM

## 2025-05-28 DIAGNOSIS — Z98.1 S/P SPINAL FUSION: ICD-10-CM

## 2025-05-28 DIAGNOSIS — M46.44 DISCITIS OF THORACIC REGION: ICD-10-CM

## 2025-05-28 DIAGNOSIS — R26.89 IMBALANCE: ICD-10-CM

## 2025-05-28 DIAGNOSIS — M40.30 FLATBACK SYNDROME: ICD-10-CM

## 2025-05-28 DIAGNOSIS — M47.814 ARTHROPATHY OF THORACIC FACET JOINT: ICD-10-CM

## 2025-05-28 DIAGNOSIS — M54.6 BILATERAL THORACIC BACK PAIN, UNSPECIFIED CHRONICITY: ICD-10-CM

## 2025-05-28 DIAGNOSIS — R26.9 GAIT ABNORMALITY: ICD-10-CM

## 2025-05-28 DIAGNOSIS — G58.8 CLUNEAL NEUROPATHY: ICD-10-CM

## 2025-05-28 DIAGNOSIS — Z98.1 HISTORY OF LUMBAR FUSION: ICD-10-CM

## 2025-05-28 DIAGNOSIS — G95.20 CERVICAL SPINAL CORD COMPRESSION (H): ICD-10-CM

## 2025-05-28 DIAGNOSIS — M47.812 CERVICAL SPONDYLOSIS: ICD-10-CM

## 2025-06-04 ENCOUNTER — MYC MEDICAL ADVICE (OUTPATIENT)
Dept: ORTHOPEDICS | Facility: CLINIC | Age: 80
End: 2025-06-04
Payer: MEDICARE

## 2025-06-18 ENCOUNTER — TELEPHONE (OUTPATIENT)
Dept: ORTHOPEDICS | Facility: CLINIC | Age: 80
End: 2025-06-18
Payer: MEDICARE

## 2025-07-09 ENCOUNTER — ANCILLARY PROCEDURE (OUTPATIENT)
Dept: CT IMAGING | Facility: CLINIC | Age: 80
End: 2025-07-09
Attending: ORTHOPAEDIC SURGERY
Payer: MEDICARE

## 2025-07-09 DIAGNOSIS — G95.20 CERVICAL SPINAL CORD COMPRESSION (H): ICD-10-CM

## 2025-07-09 DIAGNOSIS — M54.14 THORACIC RADICULOPATHY: ICD-10-CM

## 2025-07-09 DIAGNOSIS — R26.89 IMBALANCE: ICD-10-CM

## 2025-07-09 DIAGNOSIS — M47.812 CERVICAL SPONDYLOSIS: ICD-10-CM

## 2025-07-09 DIAGNOSIS — M54.6 BILATERAL THORACIC BACK PAIN, UNSPECIFIED CHRONICITY: ICD-10-CM

## 2025-07-09 DIAGNOSIS — M46.44 DISCITIS OF THORACIC REGION: ICD-10-CM

## 2025-07-09 DIAGNOSIS — M40.30 FLATBACK SYNDROME: ICD-10-CM

## 2025-07-09 DIAGNOSIS — R26.9 GAIT ABNORMALITY: ICD-10-CM

## 2025-07-09 DIAGNOSIS — M47.814 ARTHROPATHY OF THORACIC FACET JOINT: ICD-10-CM

## 2025-07-09 DIAGNOSIS — Z98.1 HISTORY OF LUMBAR FUSION: ICD-10-CM

## 2025-07-09 DIAGNOSIS — Z98.1 S/P SPINAL FUSION: ICD-10-CM

## 2025-07-09 DIAGNOSIS — M51.34 DEGENERATION OF THORACIC INTERVERTEBRAL DISC: ICD-10-CM

## 2025-07-09 DIAGNOSIS — G58.8 CLUNEAL NEUROPATHY: ICD-10-CM

## 2025-07-09 PROCEDURE — 72131 CT LUMBAR SPINE W/O DYE: CPT | Mod: GC | Performed by: RADIOLOGY

## 2025-07-09 PROCEDURE — 72128 CT CHEST SPINE W/O DYE: CPT | Mod: GC | Performed by: RADIOLOGY

## 2025-07-10 DIAGNOSIS — M51.34 DEGENERATION OF THORACIC INTERVERTEBRAL DISC: Primary | ICD-10-CM

## 2025-07-10 DIAGNOSIS — Z98.1 S/P SPINAL FUSION: ICD-10-CM

## 2025-07-10 DIAGNOSIS — R26.9 GAIT ABNORMALITY: ICD-10-CM

## 2025-07-11 LAB
RADIOLOGIST FLAGS: NORMAL
RADIOLOGIST FLAGS: NORMAL

## 2025-07-15 NOTE — PROGRESS NOTES
In-Person Visit    Spine Surgery Hx:   03/08/2016 - TLIF L2-5, posterior instrumentation L2-S1 (Dr.John Sanchez, [place], [place]) for degen spondy L4-5, HNP L5-S1, multilevel degenerative disc disease and facet arthropathy.  [Implants: DePuy Expedium screw system; MagniFuse allograft and Ladi Elite allograft].  11/07/2017 - Revision PISF L5-S1, bilateral pelvic fixation; Infuse BMP (Dr. Jose Eduardo Sanchez, [place], [place]) for pseudarthrosis with broken left S1 screw. [Implants: DePuy Expedium].   01/07/2020 - PSO L4; TLIF-SPO L1-2, Rev PISF L1-pelvis with bilateral Bedrock procedure (Abril/João) for flatback syndrome, pseudarthrosis, broken instrumentation.  [Implants: Medtronic Solera and Ballast screw system; Capstone Control cages; SI-Bone iFuse 3D x2].  11/26/2024 - Excision right paraspinal intramuscular lipoma (Dr. TANIKA Bauer).  [Path: Soft tissue right paraspinal tumor].         Chief Complaint   Patient presents with    RECHECK     F/U INJECTIONS BY DR MEDINA.   Cervical cord compression & Thoracic Discitis. Balance problems          Last Visit Date: 2/4/2025  Previous Impression:  79 year old female with:  1.  Balance difficulties probably 2' to R gluteus medius weakness, with Trendelenburg gait, (+) Right Trendelenburg sign, and asymmetric R hip abduction weakness (4/5 on Right) (2/4/25).   2.  Relatively mild cervical canal stenosis, cervical mylelopathy with UE hyperreflexia, (+) R Bianchi's. No axial neck pain or radicular symptoms. Imbalance does not seem to be related to myelopathy.   3.  PJK s/p L1-pelvis fusion; L4 PSO; bilateral Bedrock (1/7/20 Abril/João).  4.  Multilevel thoracic spondylosis which is most advanced T9-T12,  Biopsy 5/21/24 negative for infectious process.  Previous Plan:  - follow up in 6 months with Dr. Jin with AP/lateral scoliosis XR.       S>  79 year old female, return to F/U Injections by  Dr Medina.    Accompanied today by her daughter.    3/3/25 - Right T10-11 facet  "injection with steroid (Dr. Medina).  Per report, pain improved from 4/10 to 1.  5/12/25 - Dxtic MBB Right T10-T12 (Dr. Medina).  Per report, pain completely relieved from 4/10 to 0.  5/19/25 - 2nd Dxtic MBB Right T10-T12 (Dr. Medina).  Per report, pain completely relieved from 4/10 to 0.  7/21/25 - RFA Right T10-T12 (Right T9, T10, T11 medial branches) (Dr. Medina).  No response recorded in report.  Per patient, definitely helped as well.  Improved from 5/10 to 2.    EMG 7/20/25 (Dr. Crowder Robertson - [facility]).  Conclusion:  \"This is an abnormal electrodiagnostic study. Borderline reduced amplitude in the right peroneal motor response is suggestive of a non-localizable right-sided peroneal neuropathy, although technical factors cannot be excluded. Additionally, there is evidence of subtle chronic neuropathic changes in the right gluteus medius, suggestive of a right-sided radiculopathy at or around the S1 nerve rootlet. There is no evidence of active denervation. \"    Patient endorses significant improvement of back pain symptoms after the ablation procedure yesterday and is feeling a significant improvement in her overall pain and quality of life. She continues to do her aqua aerobics which have helped but does endorse occasional right sided radiculopathy symptoms. She was wondering about her leg lengths as she feels once side is shorter and she tilts to the right when ambulating.      Oswestry (IAM) Questionnaire        7/22/2025     7:24 AM   OSWESTRY DISABILITY INDEX   Count 10    Sum 20    Oswestry Score (%) 40 %        Patient-reported      S/p PSO L4; TLIF-SPO L1-2; Rev PISF L1-pelvis with bilateral Bedrock (1/7/20):  IAM preop 44.44%  1mo  32.5%  2mo  40%  3mo  24%  7mo  28.89%  1yr  26%  2yr  37.78%  3yr  37.78%    4yr  48.89%  4.5yr  51.11%  5yr  42.22%  5.5yr  40%      Neck Disability Index (NDI) Questionnaire        1/30/2025     6:30 PM   Neck Disability Index (NDI)   Neck Disability Index: " Count 10   NDI: Total Score = SUM (points for all 10 findings) 7   Neck Disability in Percent = (Total Score) / 50 * 100 14 (%)        Visual Analog Pain Scale  Back Pain Scale 0-10: 2 (mid back pain)  Right leg pain: 0  Left leg pain: 0  Neck Pain Scale 0-10: 0  Right arm pain: 0  Left arm pain: 0    PROMIS-10 Scores  Global Mental Health Score: (Patient-Rptd) (P) 13  Global Physical Health Score: (Patient-Rptd) (P) 11  PROMIS TOTAL - SUBSCORES: (Patient-Rptd) (P) 24    Physical Examination:    Alert, oriented x 3, cooperative.  Not in CP distress.  There were no vitals taken for this visit.  Ambulates independently.   Grossly neurologically intact.    I watched the patient walk both in the room and out in the hallway, both with and without a cane.  Without a cane, she has a side to side sway, suggestive of Trendelenburg gait, likely secondary to right gluteus medius weakness.  This improved significantly with the use of a cane on her left hand.    Imaging:    XR EOS Total Body obtained today which shows kyphosis of the thoracic spine, well fixed hardware in the lumbar spine with no signs of loosening or failure. Also demonstrates equal leg lengths.    Assessment:    79 year old female with:  1.  Balance difficulties probably 2' to R gluteus medius weakness, with Trendelenburg gait, (+) Right Trendelenburg sign, and asymmetric R hip abduction weakness (4/5 on Right) (2/4/25).  2.  Improved lower/mid back pain after RFA  3.  S1 Radiculopathy, mild symptoms    Plan:    Had good long discussion with the patient.  While her x-rays do show proximal junctional and thoracic kyphosis, and with still significant pelvic retroversion, we agreed that it may not be in her best interest to undergo another big multilevel spinal fusion with deformity correction, both given her age, and her desire not to go through another very long recovery process.  As such, we recommend that she continue with exhaustive nonoperative treatment,  including physical therapy to focus on gait retraining, hip abduction strengthening and balance issues; and aquatic therapy.  May also continue with gabapentin as she had been taking for some time now.  For now, we will hold off on recommending or performing additional surgery, unless things get significantly worse.  She is advised to reach out to us if any changes arise.    1. Physical therapy referral for gluteus medius strengthening, continue aqua aerobics  2. Continue monitoring symptoms, anticipate continued relief  3. Continue symptomatic management, continue Gabapentin use    Follow up PRN    Vilma Moore MD  Orthopaedic Surgery PGY-2    Attestation:  I (Dr. Rodolfo Jin - Spine Surgeon) have personally evaluated patient with PGY-2 Oscar, and agree with findings and plan outlined in the note, which I also edited.  I discussed at length with the patient/family, explained the nature of spinal condition, and formulated workup and/or treatment plan together.  All questions were answered to the best of my ability and to patient's apparent satisfaction.      I personally spent 25 minutes on the date of the encounter doing chart review/review of outside records/review of test results/interpretation of tests/patient visit/documentation/discussion with other provider(s)/discussion with patient and family.    Rodolfo Jin MD    Orthopaedic Spine Surgery  Dept Orthopaedic Surgery, [facility]  [phone] office, [phone] pager  [email or web address]

## 2025-07-19 ENCOUNTER — HEALTH MAINTENANCE LETTER (OUTPATIENT)
Age: 80
End: 2025-07-19

## 2025-07-21 ENCOUNTER — HOSPITAL ENCOUNTER (OUTPATIENT)
Facility: AMBULATORY SURGERY CENTER | Age: 80
Discharge: HOME | End: 2025-07-21
Attending: PHYSICAL MEDICINE & REHABILITATION | Admitting: PHYSICAL MEDICINE & REHABILITATION
Payer: MEDICARE

## 2025-07-21 ENCOUNTER — ANCILLARY PROCEDURE (OUTPATIENT)
Dept: RADIOLOGY | Facility: AMBULATORY SURGERY CENTER | Age: 80
End: 2025-07-21
Attending: PHYSICAL MEDICINE & REHABILITATION
Payer: MEDICARE

## 2025-07-21 ENCOUNTER — OFFICE VISIT (OUTPATIENT)
Dept: PHYSICAL MEDICINE AND REHAB | Facility: CLINIC | Age: 80
End: 2025-07-21
Payer: MEDICARE

## 2025-07-21 VITALS
SYSTOLIC BLOOD PRESSURE: 140 MMHG | DIASTOLIC BLOOD PRESSURE: 95 MMHG | HEART RATE: 58 BPM | RESPIRATION RATE: 12 BRPM | HEIGHT: 61 IN | TEMPERATURE: 96.7 F | OXYGEN SATURATION: 98 % | BODY MASS INDEX: 35.87 KG/M2 | WEIGHT: 190 LBS

## 2025-07-21 DIAGNOSIS — M47.814 ARTHROPATHY OF THORACIC FACET JOINT: ICD-10-CM

## 2025-07-21 DIAGNOSIS — M47.894 OTHER OSTEOARTHRITIS OF SPINE, THORACIC REGION: ICD-10-CM

## 2025-07-21 DIAGNOSIS — M47.899: ICD-10-CM

## 2025-07-21 DIAGNOSIS — M62.838 MUSCLE SPASM: ICD-10-CM

## 2025-07-21 DIAGNOSIS — M62.838 OTHER MUSCLE SPASM: ICD-10-CM

## 2025-07-21 DIAGNOSIS — Z98.1 S/P SPINAL FUSION: ICD-10-CM

## 2025-07-21 DIAGNOSIS — G58.8 CLUNEAL NEUROPATHY: ICD-10-CM

## 2025-07-21 DIAGNOSIS — Z98.1 S/P SPINAL FUSION: Primary | ICD-10-CM

## 2025-07-21 PROCEDURE — 64642 CHEMODENERV 1 EXTREMITY 1-4: CPT | Performed by: PHYSICAL MEDICINE & REHABILITATION

## 2025-07-21 PROCEDURE — 99152 MOD SED SAME PHYS/QHP 5/>YRS: CPT | Performed by: PHYSICAL MEDICINE & REHABILITATION

## 2025-07-21 PROCEDURE — 64633 DESTROY CERV/THOR FACET JNT: CPT | Mod: RT

## 2025-07-21 PROCEDURE — 76942 ECHO GUIDE FOR BIOPSY: CPT | Performed by: PHYSICAL MEDICINE & REHABILITATION

## 2025-07-21 PROCEDURE — 64634 DESTROY C/TH FACET JNT ADDL: CPT | Mod: RT | Performed by: PHYSICAL MEDICINE & REHABILITATION

## 2025-07-21 PROCEDURE — 64633 DESTROY CERV/THOR FACET JNT: CPT | Mod: RT | Performed by: PHYSICAL MEDICINE & REHABILITATION

## 2025-07-21 RX ORDER — SODIUM CHLORIDE 9 MG/ML
500 INJECTION, SOLUTION INTRAVENOUS CONTINUOUS
Status: ACTIVE | OUTPATIENT
Start: 2025-07-21 | End: 2025-07-21

## 2025-07-21 RX ORDER — LIDOCAINE HYDROCHLORIDE 40 MG/ML
INJECTION, SOLUTION RETROBULBAR PRN
Status: DISCONTINUED | OUTPATIENT
Start: 2025-07-21 | End: 2025-07-21 | Stop reason: HOSPADM

## 2025-07-21 RX ORDER — FENTANYL CITRATE 50 UG/ML
INJECTION, SOLUTION INTRAMUSCULAR; INTRAVENOUS PRN
Status: DISCONTINUED | OUTPATIENT
Start: 2025-07-21 | End: 2025-07-21 | Stop reason: HOSPADM

## 2025-07-21 RX ORDER — LIDOCAINE HYDROCHLORIDE 10 MG/ML
INJECTION, SOLUTION EPIDURAL; INFILTRATION; INTRACAUDAL; PERINEURAL PRN
Status: DISCONTINUED | OUTPATIENT
Start: 2025-07-21 | End: 2025-07-21 | Stop reason: HOSPADM

## 2025-07-21 RX ORDER — LIDOCAINE 40 MG/G
CREAM TOPICAL
Status: DISCONTINUED | OUTPATIENT
Start: 2025-07-21 | End: 2025-07-22 | Stop reason: HOSPADM

## 2025-07-21 RX ADMIN — BUPIVACAINE HYDROCHLORIDE 5 MG: 2.5 INJECTION, SOLUTION EPIDURAL; INFILTRATION; INTRACAUDAL; PERINEURAL at 11:45

## 2025-07-21 RX ADMIN — TRIAMCINOLONE ACETONIDE 40 MG: 40 INJECTION, SUSPENSION INTRA-ARTICULAR; INTRAMUSCULAR at 11:46

## 2025-07-21 NOTE — LETTER
7/21/2025       RE: Carmelita Mina  [id number] [facility] [address]     Dear Colleague,    Thank you for referring your patient, Carmelita Mina, to the [facility] [place] [facility]. Please see a copy of my visit note below.    PROCEDURE NOTE:   Piriformis Muscle Botulinum Toxin-A (Botox) Injection - and -   Cluneal Nerve Block Under Ultrasound Guidance    PROCEDURE DATE: 07/21/2025             PATIENT NAME: Carmelita Mina  YOB: 1945    ATTENDING PHYSICIAN: Veronica Medina MD  FELLOW/RESIDENT PHYSICIAN: None    PREOPERATIVE DIAGNOSIS:   Muscle spasm  (primary encounter diagnosis)  Spasm of right piriformis muscle  Spasm of muscle  Piriformis syndrome of right side  Piriformis muscle pain  S/P spinal fusion  Cluneal neuropathy    OSTOPERATIVE DIAGNOSIS: same    PROCEDURE PERFORMED:   Right Piriformis Muscle Botox Injection Under Ultrasound Guidance  Right Superior Cluneal Nerve Block Under Ultrasound Guidance    ULTRASOUND WAS USED.    INDICATIONS FOR THE PROCEDURE:  Carmelita Mina is a 79 year old female who presents with piriformis syndrome and muscle spasm and cluneal neuropathy s/p lumbosacral spinal fusion      INTERVAL HISTORY:  Patient was last seen 4/28/2025 for repeat Botox and Piriformis muscle injections. She has had at least 70-75% improvement similar to previous injections, however, she reports the piriformis region seems to have worn off several weeks ago and not as long lasting as prior. She notices increased pain and worse sitting tolerance compared to prior injections.     Also notes her gait has had issues learning toward one side. When observing double leg stance, she has one knee in slight flexion, and Trendelenburg gait partially compensated with ambulation. She has an appointment and follow up with orthopedic spine surgery and will have full spine  imaging completed at that time as well.     Lastly, she underwent R T10-11,T11-12 medial branch radiofrequency ablation (RFA) procedure following 2 diagnostic /confirmatory blocks with at least 80% improvement in pain and symptoms. She had been scheduled for the procedure about 1-2 weeks ago but had an unexpected death in the family, needing to travel out of state and reschedule. Given the distance needed to travel for clinic visits, increases in pain with distance and prolonged sitting, she had rescheduled many of her visits closer to together which is reasonable.       PROCEDURE AND FINDINGS:  She was greeted in the clinic. The risk, benefits and alternatives to the procedure were again reviewed with her and informed consent was obtained and the patient agreed to proceed. A time-out was performed. Following review alternatives, benefits and risks, the procedure was carried out under sterile prep with sterile gel. The use of direct sonographic guidance was used to ensure accurate placement of the needle (rather than non-guided injection) and required to minimize the risk of bleeding or injury to nearby neurovascular structures. Images were recorded and stored.       1. Right Piriformis Muscle Botox Injection  First, a 5-1MHz ultrasound transducer was used to visualize the relevant structures and determine the optimal needle path for the procedure.  1mL 1% lidocaine was infiltrated at the needle insertion site subcutaneously. Then, a 22 gauge 5-inch Quincke spinal needle was advanced from lateral to medial utilizing an in-plane approach in long axis, under continuous ultrasound guidance to the piriformis muscle on the right. After negative aspiration, slow injection of the treatment solution 2mL total consisting of 100 units Botox reconstituted in 2mL of preservative free normal saline was instilled into affected area.      100 units of botulinum toxin was diluted 50 units/mL of preservative free saline. The  following muscles were injected:    100 units per right piriformis muscle   ____________________________________    100 total units used.   The remainder (0 units) of the single-use vials were discarded.      2. Right Superior Cluneal Nerve Block  Next, a 5-1MHz ultrasound transducer was used to visualize the relevant structures and determine the optimal needle path for the procedure.  1mL 1% lidocaine was infiltrated at the needle insertion site subcutaneously. Then, a 22 gauge 5-inch needle was advanced from lateral to medial utilizing an in-plane approach, under continuous ultrasound guidance to the right superior cluneal nerve region at the level of the iliac crest. After negative aspiration, slow injection of the treatment solution 4mL total consisting of 1mL Kenalog (40mg/mL) and 3mL of 0.25% Bupivacaine was instilled into affected area.     The tip of the needle was visualized throughout the procedure. She tolerated the procedure well, was discharged home in stable condition.     Follow-up will be in clinic 12-weeks for repeat injection.     COMPLICATIONS: None    COMMENTS:  None    Again, thank you for allowing me to participate in the care of your patient.      Sincerely,    Veronica Medina MD

## 2025-07-21 NOTE — DISCHARGE INSTRUCTIONS
Home Care Instructions after a Radio Frequency Ablation    Activity  -Rest today  -Do not work today  -Resume normal activity in 2-3 days  -No heavy lifting, turning or twisting for 24 hours  -DO NOT shower or soak in tub for 24 hours  -DO NOT remove bandaid for 24 hours    Pain  -You may experience soreness at the injection site for one or two days  -You may use an ice pack for 20 minutes every 2 hours for the first 24 hours, longer if needed for comfort, do not use heat  -You may use Tylenol (acetaminophen) every 4 hours or other pain medicines as directed by your physician  -If other pain medications are prescribed by your physician, please follow dosing instructions carefully.    What to expect  You may experience numbness radiating into your legs or arms (depending on the procedure location). This numbness may last several hours. Until sensation returns to normal, please use caution in walking, climbing stairs, and stepping out of your vehicle, etc. Relief of your initial symptoms can take up to 4 weeks to feel better, this is normal and due to the healing process. The procedure site may feel like a deep burn. Using ice will greatly minimize this discomfort. Do not use numbing creams or patches over your injection sites immediately following your procedure. Please keep injection sites covered, clean and dry for 24 hours.    DID YOU RECEIVE SEDATION TODAY?  Yes    Safety  Sedation medicine, if given, may remain active for many hours. It is important for the next 24 hours that you do not:  -Drive a car  -Operate machines or power tools  -Consume alcohol, including beer  -Sign any important papers or legal documents  -Please have a responsible adult with you for 24 hours following any sedation    Please contact us if you have:  -Severe pain  -Fever more than 101.5 degrees Fahrenheit  -Signs of infection at the injection site (redness, swelling, or drainage)    FOR PM&R PATIENTS:  For patients seen by the PM and R  service, please call [phone]. (Monday through Friday 8a-5p.  After business hours and weekends call [phone] and ask for the PM and R doctor on call). If you need to fax a pain diary to PM&R the fax number is [phone]. If you are unable to fax, uploading to [email or web address] is encouraged, then send to provider. If you have procedure scheduling questions please call [phone] Option #2.

## 2025-07-21 NOTE — H&P
Carmelita FUNEZ [facility]  [id number]  female  79 year old      Reason for procedure/surgery: Right T10-11 T11-12 facet radiofrequency ablation    Other osteoarthritis of spine, thoracic region  S/P spinal fusion  Arthropathy of thoracic facet joint        Patient Active Problem List   Diagnosis    Flatback syndrome    Pseudoarthrosis of lumbar spine    Arthritis    Pain of right sacroiliac joint    Right leg pain    S/P lumbar fusion    Spondylolisthesis of lumbar region    S/P spinal surgery    Morbid obesity (H)    S/P spinal fusion    Arthropathy of thoracic facet joint    Other osteoarthritis of spine, thoracic region    Abnormal mammogram    Benign essential hypertension    Bilateral pseudophakia    Bunion, right foot    Hammer toe of right foot    Dry eye syndrome of both eyes    Dyslipidemia    Generalized OA    GERD without esophagitis    Hearing loss    Iron deficiency anemia    Posterior vitreous detachment of both eyes    Primary osteoarthritis of both knees    Rhinosinusitis    Varicose vein of leg    Ovarian cyst    Normocytic anemia    Drusen of macula of both eyes    Dermatochalasis of both upper eyelids    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       Past Surgical History:    Past Surgical History:   Procedure Laterality Date    BUNIONECTOMY Bilateral     DENTAL SURGERY      extraction    DESTRUCTION OF PARAVERTEBRAL FACETCERVICAL / THORACIC SINGLE Right 3/3/2025    Procedure: Right T10-11 intra-articular facet steroid injection;  Surgeon: Veronica Medina MD;  Location: [facility] OR    EXCISE MASS TRUNK Right 12/2/2024    Procedure: Excision Right  Paraspinal Tumor;  Surgeon: Emmanuel Bauer MD;  Location: [facility] OR    HYSTERECTOMY  1985    Same time as lap minoo, appy and fibroid removal    INJECT BLOCK MEDIAL BRANCH CERVICAL/THORACIC/LUMBAR Right 5/12/2025    Procedure: Right T10-11,T11-12 medial branch block #1;  Surgeon: Veronica Medina MD;  Location: [facility] [place] OR     INJECT BLOCK MEDIAL BRANCH CERVICAL/THORACIC/LUMBAR Right 5/19/2025    Procedure: Right T10-11,T11-12 medial branch block #2;  Surgeon: Veronica Medina MD;  Location: [facility] OR    IR DISC ASPIRATION INJECTION  5/21/2024    L2-5 TRANSFORAMINAL LUMBAR INTERBODY FUSION L5-S1 POSTERIOR LUMBAR INTERBODY FUSION  03/08/2016    L5-S1 pseudoarthrosis revision with BMP and iliac screws  11/07/2017    LAPAROSCOPIC APPENDECTOMY  1985    Same time as lap minoo, appy and fibroid removal    LAPAROSCOPIC CHOLECYSTECTOMY  1985    Same time as lap minoo, appy and fibroid removal    LAPAROSCOPY TUBAL LIGATION      LAPAROTOMY UTERUS FIBROID TUMOR RESECTION/MYOMECTOMY  1985    Same time as lap minoo, appy and fibroid removal    OPTICAL TRACKING SYSTEM FUSION SACRAL ILIAC N/A 1/7/2020    Procedure: SacroIliac joint fusion with  bedrock;  Surgeon: Segundo Samuels MD;  Location: [facility] OR    OPTICAL TRACKING SYSTEM FUSION SPINE POSTERIOR LUMBAR THREE+ LEVELS N/A 1/7/2020    Procedure: Lumbar 1 to pelvis revision transforaminal lumbar interbody fusion, pedicle subtraction osteotomies Lumbar 4, Smith-Flowers osteotomy Lumbar 1- Lumbar 2;  Surgeon: Segnudo Samuels MD;  Location: [facility] OR    SINUS SURGERY  1988    nasal polyps       Past Medical History:   Past Medical History:   Diagnosis Date    Asthma     Back pain     Flatback syndrome     GERD (gastroesophageal reflux disease)     H/O systemic lupus erythematosus (SLE) (H)     HLD (hyperlipidemia)     HTN (hypertension)     Pseudoarthrosis of lumbar spine        Social History:   Social History     Tobacco Use    Smoking status: Never    Smokeless tobacco: Never   Substance Use Topics    Alcohol use: Yes     Comment: occasional glass wine rarely       Family History:   Family History   Problem Relation Age of Onset    Cardiovascular Mother         PCI with stent    Cerebrovascular Disease Mother         aneurysm     Cardiovascular Sister         MI        Allergies:   Allergies    Allergen Reactions    Atropine Other (See Comments)     Talwin atropine combination medication  Cardiac arrest  Talwin atropine combination medication  Cardiac arrest  Other reaction(s): cardiac arrest, itchy, short of breath    Cefaclor Nausea and Vomiting, Rash and Anaphylaxis    Pentazocine Other (See Comments)     Talwin atropine combination medication caused cardiac arrest.  Talwin atropine combination medication caused cardiac arrest.  Other reaction(s): cardiac arrest    Naproxen Headache, Muscle Pain (Myalgia), Nausea and Vomiting and Hives     Tolerates ibuprofen.    Penicillins Other (See Comments) and Unknown     Childhood reaction. Patient unsure of reaction.      Seasonal Allergies Other (See Comments)     Corn, springtime allergens causing sneezing.    Tuberculin Purified Protein Derivative Other (See Comments)     swelling  Other reaction(s): Edema  swelling    Erythromycin Rash     Other reaction(s): Other       Active Medications:   Current Outpatient Medications   Medication Sig Dispense Refill    acetaminophen (TYLENOL) 325 MG tablet Take 2 tablets (650 mg) by mouth every 4 hours as needed for mild pain. 50 tablet 0    albuterol (PROAIR HFA/PROVENTIL HFA/VENTOLIN HFA) 108 (90 Base) MCG/ACT inhaler Inhale 1 puff into the lungs.      aspirin 81 MG EC tablet Take 81 mg by mouth 2 times daily (with meals)      atorvastatin (LIPITOR) 40 MG tablet Take 40 mg by mouth At Bedtime       calcium carbonate (OS-SARWAT) 1500 (600 Ca) MG tablet Take 600 mg by mouth daily      furosemide (LASIX) 20 MG tablet Take 1 tablet by mouth every morning       gabapentin (NEURONTIN) 400 MG capsule Take 1 cap Three Times per day as needed for nerve pain (Patient taking differently: 800 mg 3 times daily. Take 2 cap Three Times per day as needed for nerve pain) 180 capsule 0    Multiple Vitamins-Minerals (MULTIVITAL) TABS Take 1 tablet by mouth every morning      pantoprazole (PROTONIX) 40 MG EC tablet Take 40 mg by mouth 2  "times daily       senna-docusate (SENOKOT-S/PERICOLACE) 8.6-50 MG tablet Take 1-2 tablets by mouth 2 times daily. 30 tablet 0    valACYclovir (VALTREX) 500 MG tablet Take 1 tablet by mouth as needed   3    Multiple Vitamins-Minerals (PRESERVISION AREDS 2 PO)          Systemic Review:   CONSTITUTIONAL: NEGATIVE for fever, chills, change in weight  ENT/MOUTH: NEGATIVE for ear, mouth and throat problems  RESP: NEGATIVE for significant cough or SOB  CV: NEGATIVE for chest pain, palpitations. + peripheral edema    Physical Examination:   Vital Signs: BP (!) 156/91 (BP Location: Right arm)   Pulse 69   Temp (!) 96.7  F (35.9  C) (Temporal)   Resp 16   Ht 1.549 m (5' 1\")   Wt 86.2 kg (190 lb)   SpO2 97%   BMI 35.90 kg/m    GENERAL: healthy, alert and no distress  NECK: no adenopathy, no asymmetry, masses, or scars  RESP: lungs clear to auscultation - no rales, rhonchi or wheezes  CV: regular rate and rhythm, normal S1 S2, no S3 or S4, no murmur, click or rub, no peripheral edema and peripheral pulses strong  ABDOMEN: soft, nontender, no hepatosplenomegaly, no masses and bowel sounds normal  MS: no gross musculoskeletal defects noted, +BLE edema    Plan: Appropriate to proceed as scheduled.      Vreonica Medina MD  7/21/2025         "

## 2025-07-21 NOTE — OP NOTE
PROCEDURE NOTE: Thoracic Radiofrequency Ablation    PROCEDURE DATE: 7/21/2025    PATIENT NAME: Carmelita Mina  YOB: 1945    ATTENDING PHYSICIAN: Veronica Medina MD  RESIDENT/FELLOW PHYSICIAN: None    PREOPERATIVE DIAGNOSIS:   Other osteoarthritis of spine, thoracic region  S/P spinal fusion  Arthropathy of thoracic facet joint    POSTOPERATIVE DIAGNOSIS: same    PROCEDURE PERFORMED: Thoracic Medial Branch Radiofrequency Ablation, at the Right  T10,T11,T12 levels to treat the Right T10-11,T11-12 facet joints (T9,T10,T11 medial branch nerves)    IV SEDATION #1: Midazolam: 2mg   IV SEDATION #2  Fentanyl: 25mcg  Sedation and additional monitoring was provided by a sedation RN    ESTIMATED BLOOD LOSS:  None    FLUOROSCOPY WAS USED.    TOTAL SEDATION TIME: 17 minutes.    INDICATIONS FOR PROCEDURE:   Carmelita Mina is a 79 year old female with a clinical picture consistent with right thoracic  facet pain/arthropathy as determined after 2 rounds of medial branch blocks with local anesthetic, extensive conversation with the patient and evaluation of the patient's pain diaries after each MBB. Patient has a history of multiple lumbar spinal fusion surgeries s/p L1-sacrum with pelvic fixation.     PROCEDURE AND FINDINGS:    Carmelita was greeted in the pre-procedure holding area. The risk, benefits and alternatives to the procedure were again reviewed with the patient and written informed consent was placed in the chart. An IV line was placed.  A 500 mL bag of NS was connected to the patient. She was taken to the procedure room and positioned prone on the fluoroscopy table.  Routine monitors were applied including EKG leads, blood pressure cuff, and pulse oximetry. Prior to the procedure a time out was completed, verifying correct patient, procedure, site, positioning, and implants and/or special equipment.     An AP fluoroscopic  film was taken to identify the aforementioned levels. The skin was  prepped with chlorhexidine and draped in the usual sterile fashion. The overlying skin and subcutaneous tissue was anesthetized using a 27-gauge 1-1/4-inch needle with 1% preservative free lidocaine for a total volume of 1mls per level.    Utilizing an ipsilateral oblique fluoroscopic view (approximately 15 ), the 100mm, 18-gauge Venom RF needles with a 10 mm active tip were advanced towards the junction of the superior articular process, the transverse processes (SAP-TP) and superolateral TP with a caudal angulation of the needle at the right T10,T11,T12 vertebral bodies. The active tip was confirmed to be at the TP junction levels with additional AP and lateral fluoroscopic views. Confirmation of the location of the needle tip was obtained utilizing a lateral fluoroscopic image and advanced to the ideal location.     Next, sensory stimulation was performed at 50 Hz and up to 1 V with reproduction of sensation at 1.0 V. Motor stimulation was then performed at 2 Hz and up to 2V without any lower extremity stimulation observed (which was confirmed by the patient's self report). Once the Venom RF needle position was confirmed and prior to ablation, 0.5mL of 4% Lidocaine was injected at each level.     Radiofrequency ablation lesioning was carried out utilizing the following parameters: 80 C for 90 seconds. Following lesioning the needles were removed. The needle insertion site was dressed appropriately.     She was taken to the recovery room where she was monitored for a brief period of time. She tolerated the procedure well and was discharged home in stable condition with post procedural instructions.     [facility]     COMPLICATIONS:  None    Comment(s):  None

## 2025-07-22 ENCOUNTER — OFFICE VISIT (OUTPATIENT)
Dept: ORTHOPEDICS | Facility: CLINIC | Age: 80
End: 2025-07-22
Payer: MEDICARE

## 2025-07-22 ENCOUNTER — ANCILLARY PROCEDURE (OUTPATIENT)
Dept: GENERAL RADIOLOGY | Facility: CLINIC | Age: 80
End: 2025-07-22
Attending: ORTHOPAEDIC SURGERY
Payer: MEDICARE

## 2025-07-22 DIAGNOSIS — R29.898 LIMITED HIP ABDUCTION: ICD-10-CM

## 2025-07-22 DIAGNOSIS — Z98.1 S/P SPINAL FUSION: Primary | ICD-10-CM

## 2025-07-22 DIAGNOSIS — Z98.1 S/P SPINAL FUSION: ICD-10-CM

## 2025-07-22 DIAGNOSIS — M51.34 DEGENERATION OF THORACIC INTERVERTEBRAL DISC: ICD-10-CM

## 2025-07-22 DIAGNOSIS — R26.9 GAIT ABNORMALITY: ICD-10-CM

## 2025-07-22 PROCEDURE — 77073 BONE LENGTH STUDIES: CPT | Performed by: HEALTH CARE PROVIDER

## 2025-07-22 PROCEDURE — 72082 X-RAY EXAM ENTIRE SPI 2/3 VW: CPT | Performed by: HEALTH CARE PROVIDER

## 2025-07-22 PROCEDURE — 99214 OFFICE O/P EST MOD 30 MIN: CPT | Mod: GC | Performed by: ORTHOPAEDIC SURGERY

## 2025-07-22 NOTE — LETTER
7/22/2025      Carmelita Mina  [address] [facility] [address]      Dear Colleague,    Thank you for referring your patient, Carmelita Mina, to the [facility] [place]. Please see a copy of my visit note below.    In-Person Visit    Spine Surgery Hx:   03/08/2016 - TLIF L2-5, posterior instrumentation L2-S1 [place] for degen spondy L4-5, HNP L5-S1, multilevel degenerative disc disease and facet arthropathy.  [Implants: DePuy Expedium screw system; MagniFuse allograft and Ladi Elite allograft].  11/07/2017 - Revision PISF L5-S1, bilateral pelvic fixation; Infuse BMP (Dr. Jose Eduardo Sanchez, [place], [place]) for pseudarthrosis with broken left S1 screw. [Implants: DePuy Expedium].   01/07/2020 - PSO L4; TLIF-SPO L1-2, Rev PISF L1-pelvis with bilateral Bedrock procedure (Abril/João) for flatback syndrome, pseudarthrosis, broken instrumentation.  [Implants: Medtronic Solera and Ballast screw system; Capstone Control cages; SI-Bone iFuse 3D x2].  11/26/2024 - Excision right paraspinal intramuscular lipoma (Dr. TANIKA Bauer).  [Path: Soft tissue right paraspinal tumor].         Chief Complaint   Patient presents with     RECHECK     F/U INJECTIONS BY DR SALGADO.   Cervical cord compression & Thoracic Discitis. Balance problems          Last Visit Date: 2/4/2025  Previous Impression:  79 year old female with:  1.  Balance difficulties probably 2' to R gluteus medius weakness, with Trendelenburg gait, (+) Right Trendelenburg sign, and asymmetric R hip abduction weakness (4/5 on Right) (2/4/25).   2.  Relatively mild cervical canal stenosis, cervical mylelopathy with UE hyperreflexia, (+) R Bianchi's. No axial neck pain or radicular symptoms. Imbalance does not seem to be related to myelopathy.   3.  PJK s/p L1-pelvis fusion; L4 PSO; bilateral Bedrock (1/7/20 Abril/João).  4.  Multilevel thoracic spondylosis which is most advanced T9-T12,  Biopsy 5/21/24 negative  "for infectious process.  Previous Plan:  - follow up in 6 months with Dr. Jin with AP/lateral scoliosis XR.       S>  79 year old female, return to F/U Injections by  Dr Medina.    Accompanied today by her daughter.    3/3/25 - Right T10-11 facet injection with steroid (Dr. Medina).  Per report, pain improved from 4/10 to 1.  5/12/25 - Dxtic MBB Right T10-T12 (Dr. Medina).  Per report, pain completely relieved from 4/10 to 0.  5/19/25 - 2nd Dxtic MBB Right T10-T12 (Dr. Medina).  Per report, pain completely relieved from 4/10 to 0.  7/21/25 - RFA Right T10-T12 (Right T9, T10, T11 medial branches) (Dr. Medina).  No response recorded in report.  Per patient, definitely helped as well.  Improved from 5/10 to 2.    EMG 7/20/25 (Dr. Crowder Robertson - [facility]).  Conclusion:  \"This is an abnormal electrodiagnostic study. Borderline reduced amplitude in the right peroneal motor response is suggestive of a non-localizable right-sided peroneal neuropathy, although technical factors cannot be excluded. Additionally, there is evidence of subtle chronic neuropathic changes in the right gluteus medius, suggestive of a right-sided radiculopathy at or around the S1 nerve rootlet. There is no evidence of active denervation. \"    Patient endorses significant improvement of back pain symptoms after the ablation procedure yesterday and is feeling a significant improvement in her overall pain and quality of life. She continues to do her aqua aerobics which have helped but does endorse occasional right sided radiculopathy symptoms. She was wondering about her leg lengths as she feels once side is shorter and she tilts to the right when ambulating.      Oswestry (IAM) Questionnaire        7/22/2025     7:24 AM   OSWESTRY DISABILITY INDEX   Count 10    Sum 20    Oswestry Score (%) 40 %        Patient-reported      S/p PSO L4; TLIF-SPO L1-2; Rev PISF L1-pelvis with bilateral Bedrock (1/7/20):  IAM " preop 44.44%  1mo  32.5%  2mo  40%  3mo  24%  7mo  28.89%  1yr  26%  2yr  37.78%  3yr  37.78%    4yr  48.89%  4.5yr  51.11%  5yr  42.22%  5.5yr  40%      Neck Disability Index (NDI) Questionnaire        1/30/2025     6:30 PM   Neck Disability Index (NDI)   Neck Disability Index: Count 10   NDI: Total Score = SUM (points for all 10 findings) 7   Neck Disability in Percent = (Total Score) / 50 * 100 14 (%)        Visual Analog Pain Scale  Back Pain Scale 0-10: 2 (mid back pain)  Right leg pain: 0  Left leg pain: 0  Neck Pain Scale 0-10: 0  Right arm pain: 0  Left arm pain: 0    PROMIS-10 Scores  Global Mental Health Score: (Patient-Rptd) (P) 13  Global Physical Health Score: (Patient-Rptd) (P) 11  PROMIS TOTAL - SUBSCORES: (Patient-Rptd) (P) 24    Physical Examination:    Alert, oriented x 3, cooperative.  Not in CP distress.  There were no vitals taken for this visit.  Ambulates independently.   Grossly neurologically intact.    I watched the patient walk both in the room and out in the hallway, both with and without a cane.  Without a cane, she has a side to side sway, suggestive of Trendelenburg gait, likely secondary to right gluteus medius weakness.  This improved significantly with the use of a cane on her left hand.    Imaging:    XR EOS Total Body obtained today which shows kyphosis of the thoracic spine, well fixed hardware in the lumbar spine with no signs of loosening or failure. Also demonstrates equal leg lengths.    Assessment:    79 year old female with:  1.  Balance difficulties probably 2' to R gluteus medius weakness, with Trendelenburg gait, (+) Right Trendelenburg sign, and asymmetric R hip abduction weakness (4/5 on Right) (2/4/25).  2.  Improved lower/mid back pain after RFA  3.  S1 Radiculopathy, mild symptoms    Plan:    Had good long discussion with the patient.  While her x-rays do show proximal junctional and thoracic kyphosis, and with still significant pelvic retroversion, we agreed that  it may not be in her best interest to undergo another big multilevel spinal fusion with deformity correction, both given her age, and her desire not to go through another very long recovery process.  As such, we recommend that she continue with exhaustive nonoperative treatment, including physical therapy to focus on gait retraining, hip abduction strengthening and balance issues; and aquatic therapy.  May also continue with gabapentin as she had been taking for some time now.  For now, we will hold off on recommending or performing additional surgery, unless things get significantly worse.  She is advised to reach out to us if any changes arise.    1. Physical therapy referral for gluteus medius strengthening, continue aqua aerobics  2. Continue monitoring symptoms, anticipate continued relief  3. Continue symptomatic management, continue Gabapentin use    Follow up PRN    Vilma Moore MD  Orthopaedic Surgery PGY-2    Attestation:  I (Dr. Rodolfo Jin - Spine Surgeon) have personally evaluated patient with PGY-2 Oscar, and agree with findings and plan outlined in the note, which I also edited.  I discussed at length with the patient/family, explained the nature of spinal condition, and formulated workup and/or treatment plan together.  All questions were answered to the best of my ability and to patient's apparent satisfaction.      I personally spent 25 minutes on the date of the encounter doing chart review/review of outside records/review of test results/interpretation of tests/patient visit/documentation/discussion with other provider(s)/discussion with patient and family.    Rodolfo Jin MD    Orthopaedic Spine Surgery  Dept Orthopaedic Surgery, [facility]  [phone] office, [phone] pager  [email or web address]     Again, thank you for allowing me to participate in the care of your patient.        Sincerely,        Rodolfo Jin MD    Electronically  signed

## 2025-07-27 ENCOUNTER — ANCILLARY PROCEDURE (OUTPATIENT)
Dept: ULTRASOUND IMAGING | Facility: CLINIC | Age: 80
End: 2025-07-27
Attending: PHYSICAL MEDICINE & REHABILITATION
Payer: MEDICARE

## 2025-07-27 DIAGNOSIS — M62.838 OTHER MUSCLE SPASM: ICD-10-CM

## 2025-07-27 DIAGNOSIS — Z98.1 S/P SPINAL FUSION: ICD-10-CM

## 2025-07-27 DIAGNOSIS — M62.838 MUSCLE SPASM: ICD-10-CM

## 2025-07-27 DIAGNOSIS — G58.8 CLUNEAL NEUROPATHY: ICD-10-CM

## 2025-07-27 RX ORDER — TRIAMCINOLONE ACETONIDE 40 MG/ML
40 INJECTION, SUSPENSION INTRA-ARTICULAR; INTRAMUSCULAR ONCE
Status: COMPLETED | OUTPATIENT
Start: 2025-07-27 | End: 2025-07-21

## 2025-07-27 RX ORDER — BUPIVACAINE HYDROCHLORIDE 2.5 MG/ML
2 INJECTION, SOLUTION EPIDURAL; INFILTRATION; INTRACAUDAL; PERINEURAL ONCE
Status: COMPLETED | OUTPATIENT
Start: 2025-07-27 | End: 2025-07-21

## 2025-07-27 NOTE — PROGRESS NOTES
PROCEDURE NOTE:   Piriformis Muscle Botulinum Toxin-A (Botox) Injection - and -   Cluneal Nerve Block Under Ultrasound Guidance    PROCEDURE DATE: 07/21/2025             PATIENT NAME: Carmelita Mina  YOB: 1945    ATTENDING PHYSICIAN: Veronica Medina MD  FELLOW/RESIDENT PHYSICIAN: None    PREOPERATIVE DIAGNOSIS:   Muscle spasm  (primary encounter diagnosis)  Spasm of right piriformis muscle  Spasm of muscle  Piriformis syndrome of right side  Piriformis muscle pain  S/P spinal fusion  Cluneal neuropathy    OSTOPERATIVE DIAGNOSIS: same    PROCEDURE PERFORMED:   Right Piriformis Muscle Botox Injection Under Ultrasound Guidance  Right Superior Cluneal Nerve Block Under Ultrasound Guidance    ULTRASOUND WAS USED.    INDICATIONS FOR THE PROCEDURE:  Carmelita Mina is a 79 year old female who presents with piriformis syndrome and muscle spasm and cluneal neuropathy s/p lumbosacral spinal fusion      INTERVAL HISTORY:  Patient was last seen 4/28/2025 for repeat Botox and Piriformis muscle injections. She has had at least 70-75% improvement similar to previous injections, however, she reports the piriformis region seems to have worn off several weeks ago and not as long lasting as prior. She notices increased pain and worse sitting tolerance compared to prior injections.     Also notes her gait has had issues learning toward one side. When observing double leg stance, she has one knee in slight flexion, and Trendelenburg gait partially compensated with ambulation. She has an appointment and follow up with orthopedic spine surgery and will have full spine imaging completed at that time as well.     Lastly, she underwent R T10-11,T11-12 medial branch radiofrequency ablation (RFA) procedure following 2 diagnostic /confirmatory blocks with at least 80% improvement in pain and symptoms. She had been scheduled for the procedure about 1-2 weeks ago but had an unexpected death in the family, needing to travel  out of state and reschedule. Given the distance needed to travel for clinic visits, increases in pain with distance and prolonged sitting, she had rescheduled many of her visits closer to together which is reasonable.       PROCEDURE AND FINDINGS:  She was greeted in the clinic. The risk, benefits and alternatives to the procedure were again reviewed with her and informed consent was obtained and the patient agreed to proceed. A time-out was performed. Following review alternatives, benefits and risks, the procedure was carried out under sterile prep with sterile gel. The use of direct sonographic guidance was used to ensure accurate placement of the needle (rather than non-guided injection) and required to minimize the risk of bleeding or injury to nearby neurovascular structures. Images were recorded and stored.       1. Right Piriformis Muscle Botox Injection  First, a 5-1MHz ultrasound transducer was used to visualize the relevant structures and determine the optimal needle path for the procedure.  1mL 1% lidocaine was infiltrated at the needle insertion site subcutaneously. Then, a 22 gauge 5-inch Quincke spinal needle was advanced from lateral to medial utilizing an in-plane approach in long axis, under continuous ultrasound guidance to the piriformis muscle on the right. After negative aspiration, slow injection of the treatment solution 2mL total consisting of 100 units Botox reconstituted in 2mL of preservative free normal saline was instilled into affected area.      100 units of botulinum toxin was diluted 50 units/mL of preservative free saline. The following muscles were injected:    100 units per right piriformis muscle   ____________________________________    100 total units used.   The remainder (0 units) of the single-use vials were discarded.      2. Right Superior Cluneal Nerve Block  Next, a 5-1MHz ultrasound transducer was used to visualize the relevant structures and determine the optimal  needle path for the procedure.  1mL 1% lidocaine was infiltrated at the needle insertion site subcutaneously. Then, a 22 gauge 5-inch needle was advanced from lateral to medial utilizing an in-plane approach, under continuous ultrasound guidance to the right superior cluneal nerve region at the level of the iliac crest. After negative aspiration, slow injection of the treatment solution 4mL total consisting of 1mL Kenalog (40mg/mL) and 3mL of 0.25% Bupivacaine was instilled into affected area.     The tip of the needle was visualized throughout the procedure. She tolerated the procedure well, was discharged home in stable condition.     Follow-up will be in clinic 12-weeks for repeat injection.     COMPLICATIONS: None    COMMENTS:  None

## 2025-09-03 ENCOUNTER — VIRTUAL VISIT (OUTPATIENT)
Dept: PHYSICAL MEDICINE AND REHAB | Facility: CLINIC | Age: 80
End: 2025-09-03
Payer: MEDICARE

## 2025-09-03 DIAGNOSIS — M62.838 SPASM OF MUSCLE: ICD-10-CM

## 2025-09-03 DIAGNOSIS — M62.838 SPASM OF RIGHT PIRIFORMIS MUSCLE: ICD-10-CM

## 2025-09-03 DIAGNOSIS — M79.18 PIRIFORMIS MUSCLE PAIN: ICD-10-CM

## 2025-09-03 DIAGNOSIS — Z98.1 S/P SPINAL FUSION: ICD-10-CM

## 2025-09-03 DIAGNOSIS — M47.814 ARTHROPATHY OF THORACIC FACET JOINT: Primary | ICD-10-CM

## 2025-09-03 DIAGNOSIS — G57.01 PIRIFORMIS SYNDROME OF RIGHT SIDE: ICD-10-CM

## 2025-09-03 DIAGNOSIS — G58.8 CLUNEAL NEUROPATHY: ICD-10-CM

## 2025-09-03 PROCEDURE — 1126F AMNT PAIN NOTED NONE PRSNT: CPT | Mod: 95 | Performed by: NURSE PRACTITIONER

## 2025-09-03 PROCEDURE — 98005 SYNCH AUDIO-VIDEO EST LOW 20: CPT | Performed by: NURSE PRACTITIONER

## (undated) DEVICE — CATH TRAY FOLEY SURESTEP 16FR WDRAIN BAG STLK LATEX A300316A

## (undated) DEVICE — SU VICRYL+ 0 27IN CT-2 VLT VCP334H

## (undated) DEVICE — PREP CHLORAPREP W/ORANGE TINT 10.5ML 930715

## (undated) DEVICE — PREP CHLORAPREP 26ML TINTED HI-LITE ORANGE 930815

## (undated) DEVICE — LINEN BACK PACK 5440

## (undated) DEVICE — ESU CORD BIPOLAR GREEN 10-4000

## (undated) DEVICE — KIT PATIENT CARE PROAXIS SYSTEM 6988-PV-ACP

## (undated) DEVICE — SU VICRYL 2-0 CT-2 27" UND J269H

## (undated) DEVICE — GOWN IMPERVIOUS ZONED XLG 9041

## (undated) DEVICE — GLOVE PROTEXIS MICRO 7.0  2D73PM70

## (undated) DEVICE — TRAY PAIN INJECTION 97A 640

## (undated) DEVICE — NDL SPINAL 25GA 3.5" QUINCKE 405180

## (undated) DEVICE — PEN MARKING SKIN W/LABELS 31145918

## (undated) DEVICE — GLOVE BIOGEL PI ULTRATOUCH G SZ 7.0 42170

## (undated) DEVICE — PREP DURAPREP 26ML APL 8630

## (undated) DEVICE — LINEN TOWEL PACK X5 5464

## (undated) DEVICE — TUBING EXTENSION SET STD BORE 6" LL

## (undated) DEVICE — SU VICRYL 1 CT-1 36" J347H

## (undated) DEVICE — SUTURE VICRYL+ 2-0 CT-2 27" UND VCP269H

## (undated) DEVICE — SPONGE SURGIFOAM 100 1974

## (undated) DEVICE — SU PROLENE 1 CTX 30" 8455H

## (undated) DEVICE — GLOVE PROTEXIS W/NEU-THERA 8.5  2D73TE85

## (undated) DEVICE — SU VICRYL 1 CT-1 36" UND J947H

## (undated) DEVICE — GLOVE BIOGEL PI MICRO SZ 7.0 48570

## (undated) DEVICE — LINEN TOWEL PACK X30 5481

## (undated) DEVICE — PREP DURAPREP REMOVER 4OZ 8611

## (undated) DEVICE — DRSG AQUACEL AG 3.5X13.75" HYDROFIBER 412012

## (undated) DEVICE — DRAPE STERI TOWEL LG 1010

## (undated) DEVICE — SYR 03ML LL W/O NDL 309657

## (undated) DEVICE — DRSG STERI STRIP 1/2X4" R1547

## (undated) DEVICE — SU MONOCRYL 4-0 PS-2 18" UND Y496G

## (undated) DEVICE — SPONGE COTTONOID 3/4X3/4" 80-1401

## (undated) DEVICE — GLOVE PROTEXIS BLUE W/NEU-THERA 8.5  2D73EB85

## (undated) DEVICE — GLOVE PROTEXIS POWDER FREE 8.5 ORTHOPEDIC 2D73ET85

## (undated) DEVICE — SOL WATER IRRIG 1000ML BOTTLE 2F7114

## (undated) DEVICE — Device

## (undated) DEVICE — MIDAS REX DIFFUSER LUBRICANT LEGEND PA100-A

## (undated) DEVICE — DRSG PRIMAPORE 03 1/8X6" 66000318

## (undated) DEVICE — CONTAINER SPECIMEN 4OZ STERILE 17099

## (undated) DEVICE — PREP CHLORAPREP W/ORANGE TINT 10.5ML 260715

## (undated) DEVICE — SUCTION MANIFOLD NEPTUNE 2 SYS 4 PORT 0702-020-000

## (undated) DEVICE — SOL NACL 0.9% IRRIG 1000ML BOTTLE 2F7124

## (undated) DEVICE — SPECIMEN CONTAINER 5OZ STERILE 2600SA

## (undated) DEVICE — GLOVE PROTEXIS W/NEU-THERA 6.5  2D73TE65

## (undated) DEVICE — ESU ELEC BLADE 2.75" COATED/INSULATED E1455

## (undated) DEVICE — RX SURGIFLO HEMOSTATIC MATRIX W/THROMBIN 8ML NEXTGEN 2993

## (undated) DEVICE — DRAPE LAP W/ARMBOARD 29410

## (undated) DEVICE — DRAIN HEMOVAC RESERVOIR KIT 10FR 1/8" MED 00-2550-002-10

## (undated) DEVICE — POSITIONER ARMBOARD FOAM 1PAIR LF FP-ARMB1

## (undated) DEVICE — LINEN STRADDLE PACK

## (undated) DEVICE — GOWN IMPERVIOUS SPECIALTY XLG/XLONG 32474

## (undated) DEVICE — TUBING SUCTION MEDI-VAC 1/4"X20' N620A

## (undated) DEVICE — BASIN SET MAJOR

## (undated) DEVICE — DRAPE IOBAN INCISE 36X23" 6651EZ

## (undated) DEVICE — TRAY PREP DRY SKIN SCRUB 067

## (undated) DEVICE — IMP SCR MEDT 5.5/6.0MM SOLERA 6.5X50MM MA 55840006550
Type: IMPLANTABLE DEVICE | Site: SPINE LUMBAR | Status: NON-FUNCTIONAL
Removed: 2020-01-07

## (undated) DEVICE — SUCTION TIP YANKAUER STR K87

## (undated) DEVICE — BRUSH SURGICAL SCRUB W/4% CHLORHEXIDINE GLUCONATE SOL 4458A

## (undated) DEVICE — SU VICRYL 1 CT-1 CR 8X18" J741D

## (undated) DEVICE — GOWN XLG DISP 9545

## (undated) DEVICE — DRSG AQUACEL AG HYDROFIBER 3.5X6" 422604

## (undated) DEVICE — BLADE BONE MILL STRK 5.0MM MED 5400-701-000

## (undated) DEVICE — ESU GROUND PAD UNIVERSAL W/O CORD

## (undated) DEVICE — PREP POVIDONE-IODINE 7.5% SCRUB 4OZ BOTTLE MDS093945

## (undated) DEVICE — ESU PENCIL W/HOLSTER E2350H

## (undated) DEVICE — GLOVE PROTEXIS W/NEU-THERA 8.0  2D73TE80

## (undated) DEVICE — DRAPE O ARM TUBE 9732722

## (undated) DEVICE — MARKER SPHERES PASSIVE MEDT PACK 5 8801075

## (undated) DEVICE — STPL SKIN 35W ROTATING HEAD PRW35

## (undated) DEVICE — MIDAS REX DISSECTING TOOL  14MH30

## (undated) DEVICE — GLOVE PROTEXIS BLUE W/NEU-THERA 7.5  2D73EB75

## (undated) DEVICE — CELL SAVER

## (undated) DEVICE — PACK SPINE INSTRUMENT DRAPE 76091-ACM7820

## (undated) DEVICE — GLOVE PROTEXIS POWDER FREE SMT 7.0  2D72PT70X

## (undated) DEVICE — ESU BIPOLAR SEALER AQUAMANTYS 6MM 23-112-1

## (undated) DEVICE — SYR 10ML SLIP TIP W/O NDL 303134

## (undated) DEVICE — ESU CANNULA VENOM RF 18GA 10X150MM LF 0406-860-225

## (undated) DEVICE — SPONGE COTTONOID 1X3" 80-1408

## (undated) DEVICE — STRAP KNEE/BODY 31143004

## (undated) RX ORDER — DIAZEPAM 5 MG/1
TABLET ORAL
Status: DISPENSED
Start: 2025-03-03

## (undated) RX ORDER — LIDOCAINE HYDROCHLORIDE 10 MG/ML
INJECTION, SOLUTION EPIDURAL; INFILTRATION; INTRACAUDAL; PERINEURAL
Status: DISPENSED
Start: 2023-02-13

## (undated) RX ORDER — PHENYLEPHRINE HCL IN 0.9% NACL 1 MG/10 ML
SYRINGE (ML) INTRAVENOUS
Status: DISPENSED
Start: 2020-01-07

## (undated) RX ORDER — LIDOCAINE HYDROCHLORIDE 10 MG/ML
INJECTION, SOLUTION INFILTRATION; PERINEURAL
Status: DISPENSED
Start: 2024-05-21

## (undated) RX ORDER — CALCIUM CHLORIDE 100 MG/ML
INJECTION INTRAVENOUS; INTRAVENTRICULAR
Status: DISPENSED
Start: 2020-01-07

## (undated) RX ORDER — ACETAMINOPHEN 325 MG/1
TABLET ORAL
Status: DISPENSED
Start: 2020-01-07

## (undated) RX ORDER — LIDOCAINE HYDROCHLORIDE 10 MG/ML
INJECTION, SOLUTION EPIDURAL; INFILTRATION; INTRACAUDAL; PERINEURAL
Status: DISPENSED
Start: 2023-05-15

## (undated) RX ORDER — LIDOCAINE HYDROCHLORIDE 10 MG/ML
INJECTION, SOLUTION EPIDURAL; INFILTRATION; INTRACAUDAL; PERINEURAL
Status: DISPENSED
Start: 2023-05-08

## (undated) RX ORDER — LIDOCAINE HYDROCHLORIDE 10 MG/ML
INJECTION, SOLUTION EPIDURAL; INFILTRATION; INTRACAUDAL; PERINEURAL
Status: DISPENSED
Start: 2022-02-17

## (undated) RX ORDER — ONDANSETRON 2 MG/ML
INJECTION INTRAMUSCULAR; INTRAVENOUS
Status: DISPENSED
Start: 2020-01-07

## (undated) RX ORDER — PROPOFOL 10 MG/ML
INJECTION, EMULSION INTRAVENOUS
Status: DISPENSED
Start: 2024-12-02

## (undated) RX ORDER — LIDOCAINE HYDROCHLORIDE 10 MG/ML
INJECTION, SOLUTION EPIDURAL; INFILTRATION; INTRACAUDAL; PERINEURAL
Status: DISPENSED
Start: 2023-08-21

## (undated) RX ORDER — AMOXICILLIN 250 MG
CAPSULE ORAL
Status: DISPENSED
Start: 2020-01-08

## (undated) RX ORDER — ONDANSETRON 2 MG/ML
INJECTION INTRAMUSCULAR; INTRAVENOUS
Status: DISPENSED
Start: 2020-01-08

## (undated) RX ORDER — GABAPENTIN 100 MG/1
CAPSULE ORAL
Status: DISPENSED
Start: 2020-01-07

## (undated) RX ORDER — LIDOCAINE HYDROCHLORIDE 10 MG/ML
INJECTION, SOLUTION EPIDURAL; INFILTRATION; INTRACAUDAL; PERINEURAL
Status: DISPENSED
Start: 2024-11-11

## (undated) RX ORDER — METOCLOPRAMIDE HYDROCHLORIDE 5 MG/ML
INJECTION INTRAMUSCULAR; INTRAVENOUS
Status: DISPENSED
Start: 2020-01-07

## (undated) RX ORDER — LIDOCAINE HYDROCHLORIDE 10 MG/ML
INJECTION, SOLUTION EPIDURAL; INFILTRATION; INTRACAUDAL; PERINEURAL
Status: DISPENSED
Start: 2025-07-21

## (undated) RX ORDER — TRIAMCINOLONE ACETONIDE 40 MG/ML
INJECTION, SUSPENSION INTRA-ARTICULAR; INTRAMUSCULAR
Status: DISPENSED
Start: 2025-04-28

## (undated) RX ORDER — CLINDAMYCIN PHOSPHATE 900 MG/50ML
INJECTION, SOLUTION INTRAVENOUS
Status: DISPENSED
Start: 2020-01-07

## (undated) RX ORDER — FENTANYL CITRATE 50 UG/ML
INJECTION, SOLUTION INTRAMUSCULAR; INTRAVENOUS
Status: DISPENSED
Start: 2020-01-07

## (undated) RX ORDER — TRIAMCINOLONE ACETONIDE 40 MG/ML
INJECTION, SUSPENSION INTRA-ARTICULAR; INTRAMUSCULAR
Status: DISPENSED
Start: 2025-02-03

## (undated) RX ORDER — EPHEDRINE SULFATE 50 MG/ML
INJECTION, SOLUTION INTRAMUSCULAR; INTRAVENOUS; SUBCUTANEOUS
Status: DISPENSED
Start: 2020-01-07

## (undated) RX ORDER — SODIUM CHLORIDE 9 MG/ML
INJECTION, SOLUTION INTRAVENOUS
Status: DISPENSED
Start: 2020-01-07

## (undated) RX ORDER — LIDOCAINE HYDROCHLORIDE 10 MG/ML
INJECTION, SOLUTION EPIDURAL; INFILTRATION; INTRACAUDAL; PERINEURAL
Status: DISPENSED
Start: 2025-04-28

## (undated) RX ORDER — LIDOCAINE HYDROCHLORIDE 10 MG/ML
INJECTION, SOLUTION EPIDURAL; INFILTRATION; INTRACAUDAL; PERINEURAL
Status: DISPENSED
Start: 2024-08-19

## (undated) RX ORDER — BUPIVACAINE HYDROCHLORIDE 2.5 MG/ML
INJECTION, SOLUTION EPIDURAL; INFILTRATION; INTRACAUDAL
Status: DISPENSED
Start: 2024-08-19

## (undated) RX ORDER — BUPIVACAINE HYDROCHLORIDE 2.5 MG/ML
INJECTION, SOLUTION EPIDURAL; INFILTRATION; INTRACAUDAL
Status: DISPENSED
Start: 2024-05-20

## (undated) RX ORDER — DOCUSATE SODIUM 100 MG/1
CAPSULE, LIQUID FILLED ORAL
Status: DISPENSED
Start: 2020-01-07

## (undated) RX ORDER — TRIAMCINOLONE ACETONIDE 40 MG/ML
INJECTION, SUSPENSION INTRA-ARTICULAR; INTRAMUSCULAR
Status: DISPENSED
Start: 2024-02-19

## (undated) RX ORDER — GABAPENTIN 100 MG/1
CAPSULE ORAL
Status: DISPENSED
Start: 2020-01-08

## (undated) RX ORDER — BUPIVACAINE HYDROCHLORIDE 2.5 MG/ML
INJECTION, SOLUTION EPIDURAL; INFILTRATION; INTRACAUDAL
Status: DISPENSED
Start: 2023-05-08

## (undated) RX ORDER — DIPHENHYDRAMINE HYDROCHLORIDE 50 MG/ML
INJECTION, SOLUTION INTRAMUSCULAR; INTRAVENOUS
Status: DISPENSED
Start: 2025-07-21

## (undated) RX ORDER — LIDOCAINE HYDROCHLORIDE 10 MG/ML
INJECTION, SOLUTION EPIDURAL; INFILTRATION; INTRACAUDAL; PERINEURAL
Status: DISPENSED
Start: 2022-05-26

## (undated) RX ORDER — LIDOCAINE HYDROCHLORIDE 10 MG/ML
INJECTION, SOLUTION EPIDURAL; INFILTRATION; INTRACAUDAL; PERINEURAL
Status: DISPENSED
Start: 2022-08-25

## (undated) RX ORDER — AMOXICILLIN 250 MG
CAPSULE ORAL
Status: DISPENSED
Start: 2020-01-07

## (undated) RX ORDER — VANCOMYCIN HYDROCHLORIDE 1 G/20ML
INJECTION, POWDER, LYOPHILIZED, FOR SOLUTION INTRAVENOUS
Status: DISPENSED
Start: 2020-01-07

## (undated) RX ORDER — BUPIVACAINE HYDROCHLORIDE 2.5 MG/ML
INJECTION, SOLUTION EPIDURAL; INFILTRATION; INTRACAUDAL
Status: DISPENSED
Start: 2025-02-03

## (undated) RX ORDER — TRIAMCINOLONE ACETONIDE 40 MG/ML
INJECTION, SUSPENSION INTRA-ARTICULAR; INTRAMUSCULAR
Status: DISPENSED
Start: 2023-05-08

## (undated) RX ORDER — TRIAMCINOLONE ACETONIDE 40 MG/ML
INJECTION, SUSPENSION INTRA-ARTICULAR; INTRAMUSCULAR
Status: DISPENSED
Start: 2021-04-26

## (undated) RX ORDER — LIDOCAINE HYDROCHLORIDE 10 MG/ML
INJECTION, SOLUTION EPIDURAL; INFILTRATION; INTRACAUDAL; PERINEURAL
Status: DISPENSED
Start: 2023-11-13

## (undated) RX ORDER — LIDOCAINE HYDROCHLORIDE 10 MG/ML
INJECTION, SOLUTION EPIDURAL; INFILTRATION; INTRACAUDAL; PERINEURAL
Status: DISPENSED
Start: 2025-02-03

## (undated) RX ORDER — PROPOFOL 10 MG/ML
INJECTION, EMULSION INTRAVENOUS
Status: DISPENSED
Start: 2020-01-07

## (undated) RX ORDER — BUPIVACAINE HYDROCHLORIDE 2.5 MG/ML
INJECTION, SOLUTION EPIDURAL; INFILTRATION; INTRACAUDAL; PERINEURAL
Status: DISPENSED
Start: 2025-04-28

## (undated) RX ORDER — LIDOCAINE HYDROCHLORIDE 10 MG/ML
INJECTION, SOLUTION EPIDURAL; INFILTRATION; INTRACAUDAL; PERINEURAL
Status: DISPENSED
Start: 2024-02-19

## (undated) RX ORDER — LIDOCAINE HYDROCHLORIDE 10 MG/ML
INJECTION, SOLUTION EPIDURAL; INFILTRATION; INTRACAUDAL; PERINEURAL
Status: DISPENSED
Start: 2024-05-21

## (undated) RX ORDER — ONDANSETRON 2 MG/ML
INJECTION INTRAMUSCULAR; INTRAVENOUS
Status: DISPENSED
Start: 2024-12-02

## (undated) RX ORDER — HYDROMORPHONE HYDROCHLORIDE 1 MG/ML
INJECTION, SOLUTION INTRAMUSCULAR; INTRAVENOUS; SUBCUTANEOUS
Status: DISPENSED
Start: 2020-01-07

## (undated) RX ORDER — TRIAMCINOLONE ACETONIDE 40 MG/ML
INJECTION, SUSPENSION INTRA-ARTICULAR; INTRAMUSCULAR
Status: DISPENSED
Start: 2025-07-21

## (undated) RX ORDER — DEXTROSE MONOHYDRATE, SODIUM CHLORIDE, AND POTASSIUM CHLORIDE 50; 1.49; 4.5 G/1000ML; G/1000ML; G/1000ML
INJECTION, SOLUTION INTRAVENOUS
Status: DISPENSED
Start: 2020-01-07

## (undated) RX ORDER — LIDOCAINE HYDROCHLORIDE 10 MG/ML
INJECTION, SOLUTION EPIDURAL; INFILTRATION; INTRACAUDAL; PERINEURAL
Status: DISPENSED
Start: 2022-11-21

## (undated) RX ORDER — FENTANYL CITRATE 50 UG/ML
INJECTION, SOLUTION INTRAMUSCULAR; INTRAVENOUS
Status: DISPENSED
Start: 2025-07-21

## (undated) RX ORDER — TRIAMCINOLONE ACETONIDE 40 MG/ML
INJECTION, SUSPENSION INTRA-ARTICULAR; INTRAMUSCULAR
Status: DISPENSED
Start: 2023-08-21

## (undated) RX ORDER — DEXAMETHASONE SODIUM PHOSPHATE 4 MG/ML
INJECTION, SOLUTION INTRA-ARTICULAR; INTRALESIONAL; INTRAMUSCULAR; INTRAVENOUS; SOFT TISSUE
Status: DISPENSED
Start: 2020-01-07

## (undated) RX ORDER — BUPIVACAINE HYDROCHLORIDE 2.5 MG/ML
INJECTION, SOLUTION EPIDURAL; INFILTRATION; INTRACAUDAL; PERINEURAL
Status: DISPENSED
Start: 2025-07-21

## (undated) RX ORDER — LIDOCAINE HYDROCHLORIDE 20 MG/ML
INJECTION, SOLUTION EPIDURAL; INFILTRATION; INTRACAUDAL; PERINEURAL
Status: DISPENSED
Start: 2020-01-07

## (undated) RX ORDER — FENTANYL CITRATE 50 UG/ML
INJECTION, SOLUTION INTRAMUSCULAR; INTRAVENOUS
Status: DISPENSED
Start: 2024-05-21

## (undated) RX ORDER — BUPIVACAINE HYDROCHLORIDE 2.5 MG/ML
INJECTION, SOLUTION EPIDURAL; INFILTRATION; INTRACAUDAL
Status: DISPENSED
Start: 2024-11-11

## (undated) RX ORDER — LIDOCAINE HYDROCHLORIDE 10 MG/ML
INJECTION, SOLUTION EPIDURAL; INFILTRATION; INTRACAUDAL; PERINEURAL
Status: DISPENSED
Start: 2021-08-19

## (undated) RX ORDER — BUPIVACAINE HYDROCHLORIDE 2.5 MG/ML
INJECTION, SOLUTION EPIDURAL; INFILTRATION; INTRACAUDAL
Status: DISPENSED
Start: 2024-12-02

## (undated) RX ORDER — BUPIVACAINE HYDROCHLORIDE 2.5 MG/ML
INJECTION, SOLUTION EPIDURAL; INFILTRATION; INTRACAUDAL
Status: DISPENSED
Start: 2023-11-13

## (undated) RX ORDER — DEXTROSE MONOHYDRATE, SODIUM CHLORIDE, AND POTASSIUM CHLORIDE 50; 1.49; 4.5 G/1000ML; G/1000ML; G/1000ML
INJECTION, SOLUTION INTRAVENOUS
Status: DISPENSED
Start: 2020-01-08

## (undated) RX ORDER — DOCUSATE SODIUM 100 MG/1
CAPSULE, LIQUID FILLED ORAL
Status: DISPENSED
Start: 2020-01-08

## (undated) RX ORDER — TRIAMCINOLONE ACETONIDE 40 MG/ML
INJECTION, SUSPENSION INTRA-ARTICULAR; INTRAMUSCULAR
Status: DISPENSED
Start: 2024-11-11

## (undated) RX ORDER — BUPIVACAINE HYDROCHLORIDE 2.5 MG/ML
INJECTION, SOLUTION EPIDURAL; INFILTRATION; INTRACAUDAL
Status: DISPENSED
Start: 2024-02-19

## (undated) RX ORDER — DEXAMETHASONE SODIUM PHOSPHATE 4 MG/ML
INJECTION, SOLUTION INTRA-ARTICULAR; INTRALESIONAL; INTRAMUSCULAR; INTRAVENOUS; SOFT TISSUE
Status: DISPENSED
Start: 2024-12-02

## (undated) RX ORDER — TRIAMCINOLONE ACETONIDE 40 MG/ML
INJECTION, SUSPENSION INTRA-ARTICULAR; INTRAMUSCULAR
Status: DISPENSED
Start: 2024-05-20

## (undated) RX ORDER — FENTANYL CITRATE 50 UG/ML
INJECTION, SOLUTION INTRAMUSCULAR; INTRAVENOUS
Status: DISPENSED
Start: 2024-12-02

## (undated) RX ORDER — BUPIVACAINE HYDROCHLORIDE 2.5 MG/ML
INJECTION, SOLUTION EPIDURAL; INFILTRATION; INTRACAUDAL
Status: DISPENSED
Start: 2023-08-21

## (undated) RX ORDER — LIDOCAINE HYDROCHLORIDE 10 MG/ML
INJECTION, SOLUTION EPIDURAL; INFILTRATION; INTRACAUDAL; PERINEURAL
Status: DISPENSED
Start: 2024-05-20

## (undated) RX ORDER — TRIAMCINOLONE ACETONIDE 40 MG/ML
INJECTION, SUSPENSION INTRA-ARTICULAR; INTRAMUSCULAR
Status: DISPENSED
Start: 2023-11-13

## (undated) RX ORDER — ACETAMINOPHEN 325 MG/1
TABLET ORAL
Status: DISPENSED
Start: 2020-01-08

## (undated) RX ORDER — LIDOCAINE HYDROCHLORIDE 10 MG/ML
INJECTION, SOLUTION EPIDURAL; INFILTRATION; INTRACAUDAL; PERINEURAL
Status: DISPENSED
Start: 2021-11-18

## (undated) RX ORDER — LIDOCAINE HYDROCHLORIDE 10 MG/ML
INJECTION, SOLUTION EPIDURAL; INFILTRATION; INTRACAUDAL; PERINEURAL
Status: DISPENSED
Start: 2021-04-26

## (undated) RX ORDER — TRIAMCINOLONE ACETONIDE 40 MG/ML
INJECTION, SUSPENSION INTRA-ARTICULAR; INTRAMUSCULAR
Status: DISPENSED
Start: 2024-08-19

## (undated) RX ORDER — ONDANSETRON 2 MG/ML
INJECTION INTRAMUSCULAR; INTRAVENOUS
Status: DISPENSED
Start: 2025-07-21